# Patient Record
Sex: MALE | Race: WHITE | NOT HISPANIC OR LATINO | Employment: PART TIME | URBAN - METROPOLITAN AREA
[De-identification: names, ages, dates, MRNs, and addresses within clinical notes are randomized per-mention and may not be internally consistent; named-entity substitution may affect disease eponyms.]

---

## 2017-04-16 ENCOUNTER — ANESTHESIA - HEALTHEAST (OUTPATIENT)
Dept: INTENSIVE CARE | Facility: HOSPITAL | Age: 57
End: 2017-04-16

## 2017-05-15 ENCOUNTER — AMBULATORY - HEALTHEAST (OUTPATIENT)
Dept: CARDIOLOGY | Facility: CLINIC | Age: 57
End: 2017-05-15

## 2017-05-15 DIAGNOSIS — I48.91 ATRIAL FIBRILLATION (H): ICD-10-CM

## 2017-05-30 ENCOUNTER — OFFICE VISIT - HEALTHEAST (OUTPATIENT)
Dept: CARDIOLOGY | Facility: CLINIC | Age: 57
End: 2017-05-30

## 2017-05-30 DIAGNOSIS — I48.19 PERSISTENT ATRIAL FIBRILLATION (H): ICD-10-CM

## 2017-05-30 DIAGNOSIS — I48.0 PAROXYSMAL ATRIAL FIBRILLATION (H): ICD-10-CM

## 2017-05-30 LAB
ATRIAL RATE - MUSE: 40 BPM
DIASTOLIC BLOOD PRESSURE - MUSE: NORMAL MMHG
INTERPRETATION ECG - MUSE: NORMAL
P AXIS - MUSE: 25 DEGREES
PR INTERVAL - MUSE: 162 MS
QRS DURATION - MUSE: 94 MS
QT - MUSE: 480 MS
QTC - MUSE: 391 MS
R AXIS - MUSE: 17 DEGREES
SYSTOLIC BLOOD PRESSURE - MUSE: NORMAL MMHG
T AXIS - MUSE: 36 DEGREES
VENTRICULAR RATE- MUSE: 40 BPM

## 2017-05-30 ASSESSMENT — MIFFLIN-ST. JEOR: SCORE: 1923.9

## 2017-06-19 ENCOUNTER — OFFICE VISIT - HEALTHEAST (OUTPATIENT)
Dept: CARDIOLOGY | Facility: CLINIC | Age: 57
End: 2017-06-19

## 2017-06-19 DIAGNOSIS — I48.0 PAROXYSMAL ATRIAL FIBRILLATION (H): ICD-10-CM

## 2017-06-19 ASSESSMENT — MIFFLIN-ST. JEOR: SCORE: 1914.83

## 2017-06-21 ENCOUNTER — AMBULATORY - HEALTHEAST (OUTPATIENT)
Dept: CARDIOLOGY | Facility: CLINIC | Age: 57
End: 2017-06-21

## 2017-06-21 ENCOUNTER — SURGERY - HEALTHEAST (OUTPATIENT)
Dept: CARDIOLOGY | Facility: CLINIC | Age: 57
End: 2017-06-21

## 2017-06-21 DIAGNOSIS — I48.91 ATRIAL FIBRILLATION (H): ICD-10-CM

## 2017-07-11 ENCOUNTER — COMMUNICATION - HEALTHEAST (OUTPATIENT)
Dept: CARDIOLOGY | Facility: CLINIC | Age: 57
End: 2017-07-11

## 2017-07-11 ENCOUNTER — ANESTHESIA - HEALTHEAST (OUTPATIENT)
Dept: CARDIOLOGY | Facility: CLINIC | Age: 57
End: 2017-07-11

## 2017-07-11 DIAGNOSIS — Z98.890 STATUS POST CIRCUMFERENTIAL ABLATION OF PULMONARY VEIN: ICD-10-CM

## 2017-07-12 ENCOUNTER — SURGERY - HEALTHEAST (OUTPATIENT)
Dept: CARDIOLOGY | Facility: CLINIC | Age: 57
End: 2017-07-12

## 2017-07-12 ASSESSMENT — MIFFLIN-ST. JEOR: SCORE: 1902.13

## 2017-07-16 ENCOUNTER — COMMUNICATION - HEALTHEAST (OUTPATIENT)
Dept: SCHEDULING | Facility: CLINIC | Age: 57
End: 2017-07-16

## 2017-07-17 ENCOUNTER — AMBULATORY - HEALTHEAST (OUTPATIENT)
Dept: CARDIOLOGY | Facility: CLINIC | Age: 57
End: 2017-07-17

## 2017-07-17 ASSESSMENT — MIFFLIN-ST. JEOR: SCORE: 1896.69

## 2017-07-21 ENCOUNTER — COMMUNICATION - HEALTHEAST (OUTPATIENT)
Dept: CARDIOLOGY | Facility: CLINIC | Age: 57
End: 2017-07-21

## 2017-08-24 ENCOUNTER — COMMUNICATION - HEALTHEAST (OUTPATIENT)
Dept: TELEHEALTH | Facility: CLINIC | Age: 57
End: 2017-08-24

## 2017-08-24 ENCOUNTER — OFFICE VISIT - HEALTHEAST (OUTPATIENT)
Dept: CARDIOLOGY | Facility: CLINIC | Age: 57
End: 2017-08-24

## 2017-08-24 DIAGNOSIS — Z86.79 S/P ABLATION OF ATRIAL FIBRILLATION: ICD-10-CM

## 2017-08-24 DIAGNOSIS — Z98.890 S/P ABLATION OF ATRIAL FIBRILLATION: ICD-10-CM

## 2017-08-24 DIAGNOSIS — I48.0 PAROXYSMAL ATRIAL FIBRILLATION (H): ICD-10-CM

## 2017-08-24 ASSESSMENT — MIFFLIN-ST. JEOR: SCORE: 1921.51

## 2017-08-28 ENCOUNTER — HOSPITAL ENCOUNTER (OUTPATIENT)
Dept: CARDIOLOGY | Facility: HOSPITAL | Age: 57
Discharge: HOME | End: 2017-08-28
Attending: NURSE PRACTITIONER

## 2017-08-28 DIAGNOSIS — Z98.890 S/P ABLATION OF ATRIAL FIBRILLATION: ICD-10-CM

## 2017-08-28 DIAGNOSIS — Z86.79 S/P ABLATION OF ATRIAL FIBRILLATION: ICD-10-CM

## 2017-08-28 DIAGNOSIS — I48.0 PAROXYSMAL ATRIAL FIBRILLATION (H): ICD-10-CM

## 2017-09-21 ENCOUNTER — COMMUNICATION - HEALTHEAST (OUTPATIENT)
Dept: CARDIOLOGY | Facility: CLINIC | Age: 57
End: 2017-09-21

## 2017-09-28 ENCOUNTER — HOSPITAL ENCOUNTER (OUTPATIENT)
Dept: CT IMAGING | Facility: CLINIC | Age: 57
Discharge: HOME | End: 2017-09-28
Attending: INTERNAL MEDICINE

## 2017-09-28 DIAGNOSIS — Z98.890 STATUS POST CIRCUMFERENTIAL ABLATION OF PULMONARY VEIN: ICD-10-CM

## 2017-09-28 LAB — BSA FOR ECHO PROCEDURE: 2.23 M2

## 2017-09-28 ASSESSMENT — MIFFLIN-ST. JEOR: SCORE: 1901.22

## 2017-10-11 ENCOUNTER — OFFICE VISIT - HEALTHEAST (OUTPATIENT)
Dept: CARDIOLOGY | Facility: CLINIC | Age: 57
End: 2017-10-11

## 2017-10-11 DIAGNOSIS — I49.9 VENTRICULAR ARRHYTHMIA: ICD-10-CM

## 2017-10-11 DIAGNOSIS — I48.0 PAROXYSMAL ATRIAL FIBRILLATION (H): ICD-10-CM

## 2017-10-11 LAB
ATRIAL RATE - MUSE: 58 BPM
DIASTOLIC BLOOD PRESSURE - MUSE: NORMAL MMHG
INTERPRETATION ECG - MUSE: NORMAL
P AXIS - MUSE: 46 DEGREES
PR INTERVAL - MUSE: 146 MS
QRS DURATION - MUSE: 94 MS
QT - MUSE: 446 MS
QTC - MUSE: 437 MS
R AXIS - MUSE: 13 DEGREES
SYSTOLIC BLOOD PRESSURE - MUSE: NORMAL MMHG
T AXIS - MUSE: 35 DEGREES
VENTRICULAR RATE- MUSE: 58 BPM

## 2017-10-11 ASSESSMENT — MIFFLIN-ST. JEOR: SCORE: 1920.72

## 2018-11-02 ENCOUNTER — COMMUNICATION - HEALTHEAST (OUTPATIENT)
Dept: ADMINISTRATIVE | Facility: CLINIC | Age: 58
End: 2018-11-02

## 2019-08-16 ENCOUNTER — COMMUNICATION - HEALTHEAST (OUTPATIENT)
Dept: CARDIOLOGY | Facility: CLINIC | Age: 59
End: 2019-08-16

## 2019-08-22 ENCOUNTER — OFFICE VISIT - HEALTHEAST (OUTPATIENT)
Dept: CARDIOLOGY | Facility: CLINIC | Age: 59
End: 2019-08-22

## 2019-08-22 DIAGNOSIS — I48.0 PAROXYSMAL ATRIAL FIBRILLATION (H): ICD-10-CM

## 2019-08-22 RX ORDER — FLECAINIDE ACETATE 50 MG/1
50 TABLET ORAL DAILY PRN
Qty: 10 TABLET | Refills: 0 | Status: SHIPPED | OUTPATIENT
Start: 2019-08-22 | End: 2022-07-19

## 2019-08-22 ASSESSMENT — MIFFLIN-ST. JEOR: SCORE: 1896.69

## 2019-08-27 ENCOUNTER — AMBULATORY - HEALTHEAST (OUTPATIENT)
Dept: CARDIOLOGY | Facility: CLINIC | Age: 59
End: 2019-08-27

## 2019-08-27 ENCOUNTER — HOSPITAL ENCOUNTER (OUTPATIENT)
Dept: CARDIOLOGY | Facility: HOSPITAL | Age: 59
Discharge: HOME | End: 2019-08-27

## 2019-08-27 DIAGNOSIS — I48.0 PAROXYSMAL ATRIAL FIBRILLATION (H): ICD-10-CM

## 2019-08-27 DIAGNOSIS — I35.9 AORTIC VALVE DISORDER: ICD-10-CM

## 2019-08-27 LAB
AORTIC ROOT: 4.1 CM
AORTIC VALVE MEAN VELOCITY: 80.5 CM/S
ASCENDING AORTA: 3.8 CM
AV DIMENSIONLESS INDEX VTI: 0.8
AV MEAN GRADIENT: 3 MMHG
AV PEAK GRADIENT: 4.6 MMHG
AV VALVE AREA: 3.4 CM2
AV VELOCITY RATIO: 0.8
BSA FOR ECHO PROCEDURE: 2.42 M2
CV BLOOD PRESSURE: NORMAL MMHG
CV ECHO HEIGHT: 75 IN
CV ECHO WEIGHT: 244 LBS
DOP CALC AO PEAK VEL: 107 CM/S
DOP CALC AO VTI: 24.3 CM
DOP CALC LVOT AREA: 4.15 CM2
DOP CALC LVOT DIAMETER: 2.3 CM
DOP CALC LVOT PEAK VEL: 88.8 CM/S
DOP CALC LVOT STROKE VOLUME: 83.5 CM3
DOP CALC MV VTI: 28.2 CM
DOP CALCLVOT PEAK VEL VTI: 20.1 CM
EJECTION FRACTION: 63 % (ref 55–75)
FRACTIONAL SHORTENING: 36.1 % (ref 28–44)
INTERVENTRICULAR SEPTUM IN END DIASTOLE: 0.91 CM (ref 0.6–1)
IVS/PW RATIO: 1
LA AREA 1: 23 CM2
LA AREA 2: 26 CM2
LEFT ATRIUM LENGTH: 5.4 CM
LEFT ATRIUM SIZE: 3 CM
LEFT ATRIUM TO AORTIC ROOT RATIO: 0.73 NO UNITS
LEFT ATRIUM VOLUME INDEX: 38.9 ML/M2
LEFT ATRIUM VOLUME: 94.1 ML
LEFT VENTRICLE CARDIAC INDEX: 1.8 L/MIN/M2
LEFT VENTRICLE CARDIAC OUTPUT: 4.3 L/MIN
LEFT VENTRICLE DIASTOLIC VOLUME INDEX: 56.6 CM3/M2 (ref 34–74)
LEFT VENTRICLE DIASTOLIC VOLUME: 137 CM3 (ref 62–150)
LEFT VENTRICLE HEART RATE: 51 BPM
LEFT VENTRICLE MASS INDEX: 64.1 G/M2
LEFT VENTRICLE SYSTOLIC VOLUME INDEX: 21 CM3/M2 (ref 11–31)
LEFT VENTRICLE SYSTOLIC VOLUME: 50.9 CM3 (ref 21–61)
LEFT VENTRICULAR INTERNAL DIMENSION IN DIASTOLE: 4.98 CM (ref 4.2–5.8)
LEFT VENTRICULAR INTERNAL DIMENSION IN SYSTOLE: 3.18 CM (ref 2.5–4)
LEFT VENTRICULAR MASS: 155.2 G
LEFT VENTRICULAR OUTFLOW TRACT MEAN GRADIENT: 2 MMHG
LEFT VENTRICULAR OUTFLOW TRACT MEAN VELOCITY: 59.7 CM/S
LEFT VENTRICULAR OUTFLOW TRACT PEAK GRADIENT: 3 MMHG
LEFT VENTRICULAR POSTERIOR WALL IN END DIASTOLE: 0.87 CM (ref 0.6–1)
LV STROKE VOLUME INDEX: 34.5 ML/M2
MITRAL VALVE DECELERATION SLOPE: 3330 MM/S2
MITRAL VALVE E/A RATIO: 1.3
MITRAL VALVE MEAN INFLOW VELOCITY: 40.8 CM/S
MITRAL VALVE PEAK VELOCITY: 78.5 CM/S
MITRAL VALVE PRESSURE HALF-TIME: 67 MS
MV AREA VTI: 2.96 CM2
MV AVERAGE E/E' RATIO: 8.2 CM/S
MV DECELERATION TIME: 229 MS
MV E'TISSUE VEL-LAT: 6 CM/S
MV E'TISSUE VEL-MED: 12.6 CM/S
MV LATERAL E/E' RATIO: 12.7
MV MEAN GRADIENT: 1 MMHG
MV MEDIAL E/E' RATIO: 6.1
MV PEAK A VELOCITY: 58.7 CM/S
MV PEAK E VELOCITY: 76.3 CM/S
MV PEAK GRADIENT: 2.5 MMHG
MV VALVE AREA BY CONTINUITY EQUATION: 3 CM2
MV VALVE AREA PRESSURE 1/2 METHOD: 3.3 CM2
NUC REST DIASTOLIC VOLUME INDEX: 3904 LBS
NUC REST SYSTOLIC VOLUME INDEX: 75 IN
PR MAX PG: 1 MMHG
PR PEAK VELOCITY: 65.2 CM/S
PV PEAK GRADIENT: 2.6 MMHG
PV VMAX: 79.9 CM/S
TRICUSPID REGURGITATION PEAK PRESSURE GRADIENT: 16.3 MMHG
TRICUSPID VALVE ANULAR PLANE SYSTOLIC EXCURSION: 2.7 CM
TRICUSPID VALVE PEAK REGURGITANT VELOCITY: 202 CM/S

## 2019-08-27 ASSESSMENT — MIFFLIN-ST. JEOR: SCORE: 1987.41

## 2019-09-30 ENCOUNTER — OFFICE VISIT - HEALTHEAST (OUTPATIENT)
Dept: CARDIOLOGY | Facility: CLINIC | Age: 59
End: 2019-09-30

## 2019-09-30 DIAGNOSIS — I48.0 PAF (PAROXYSMAL ATRIAL FIBRILLATION) (H): ICD-10-CM

## 2019-09-30 ASSESSMENT — MIFFLIN-ST. JEOR: SCORE: 1883.53

## 2019-10-09 ENCOUNTER — HOSPITAL ENCOUNTER (OUTPATIENT)
Dept: CARDIOLOGY | Facility: HOSPITAL | Age: 59
Discharge: HOME | End: 2019-10-09
Attending: INTERNAL MEDICINE

## 2019-10-09 DIAGNOSIS — I48.0 PAF (PAROXYSMAL ATRIAL FIBRILLATION) (H): ICD-10-CM

## 2019-10-15 ENCOUNTER — AMBULATORY - HEALTHEAST (OUTPATIENT)
Dept: CARDIOLOGY | Facility: CLINIC | Age: 59
End: 2019-10-15

## 2019-12-20 ENCOUNTER — TELEPHONE (OUTPATIENT)
Dept: OTHER | Facility: CLINIC | Age: 59
End: 2019-12-20

## 2021-04-16 ENCOUNTER — RECORDS - HEALTHEAST (OUTPATIENT)
Dept: LAB | Facility: CLINIC | Age: 61
End: 2021-04-16

## 2021-04-16 LAB
ANION GAP SERPL CALCULATED.3IONS-SCNC: 11 MMOL/L (ref 5–18)
BUN SERPL-MCNC: 8 MG/DL (ref 8–22)
CALCIUM SERPL-MCNC: 9 MG/DL (ref 8.5–10.5)
CHLORIDE BLD-SCNC: 107 MMOL/L (ref 98–107)
CHOLEST SERPL-MCNC: 209 MG/DL
CO2 SERPL-SCNC: 22 MMOL/L (ref 22–31)
CREAT SERPL-MCNC: 0.86 MG/DL (ref 0.7–1.3)
FASTING STATUS PATIENT QL REPORTED: ABNORMAL
GFR SERPL CREATININE-BSD FRML MDRD: >60 ML/MIN/1.73M2
GLUCOSE BLD-MCNC: 96 MG/DL (ref 70–125)
HDLC SERPL-MCNC: 47 MG/DL
LDLC SERPL CALC-MCNC: 141 MG/DL
POTASSIUM BLD-SCNC: 4.5 MMOL/L (ref 3.5–5)
PSA SERPL-MCNC: 1.8 NG/ML (ref 0–4.5)
SODIUM SERPL-SCNC: 140 MMOL/L (ref 136–145)
TRIGL SERPL-MCNC: 105 MG/DL

## 2021-04-20 ENCOUNTER — AMBULATORY - HEALTHEAST (OUTPATIENT)
Dept: NURSING | Facility: CLINIC | Age: 61
End: 2021-04-20

## 2021-05-11 ENCOUNTER — AMBULATORY - HEALTHEAST (OUTPATIENT)
Dept: NURSING | Facility: CLINIC | Age: 61
End: 2021-05-11

## 2021-05-24 ENCOUNTER — RECORDS - HEALTHEAST (OUTPATIENT)
Dept: ADMINISTRATIVE | Facility: CLINIC | Age: 61
End: 2021-05-24

## 2021-05-31 ENCOUNTER — RECORDS - HEALTHEAST (OUTPATIENT)
Dept: ADMINISTRATIVE | Facility: CLINIC | Age: 61
End: 2021-05-31

## 2021-05-31 VITALS — BODY MASS INDEX: 27.98 KG/M2 | WEIGHT: 225 LBS | HEIGHT: 75 IN

## 2021-05-31 VITALS — HEIGHT: 75 IN | BODY MASS INDEX: 27.85 KG/M2 | WEIGHT: 224 LBS

## 2021-05-31 VITALS — BODY MASS INDEX: 28.6 KG/M2 | HEIGHT: 75 IN | WEIGHT: 230 LBS

## 2021-05-31 VITALS — BODY MASS INDEX: 28.35 KG/M2 | HEIGHT: 75 IN | WEIGHT: 228 LBS

## 2021-05-31 VITALS — BODY MASS INDEX: 28.54 KG/M2 | WEIGHT: 229.5 LBS | HEIGHT: 75 IN

## 2021-05-31 VITALS — BODY MASS INDEX: 28.42 KG/M2 | HEIGHT: 75 IN | WEIGHT: 228.6 LBS

## 2021-05-31 VITALS — HEIGHT: 76 IN | WEIGHT: 225.8 LBS | BODY MASS INDEX: 27.5 KG/M2

## 2021-05-31 NOTE — PATIENT INSTRUCTIONS - HE
Rogers Martinez,    It was a pleasure to see you today at the [facility].     My recommendations after this visit include:    You will get a prescription for flecainide to attempt to convert you back to normal rhythm if you have afib symptoms.     Take your Eliquis until 9/16/19. You may stop at this point. Resume if any symptoms of Afib.     Schedule to see Dr. Mccann for possible repeat EP study and ablation.       Nancy Santos NP-C  [facility] [place] [facility], Electrophysiology  [phone]

## 2021-05-31 NOTE — TELEPHONE ENCOUNTER
Called and spoke with patient, he hasn't been seen in roughly 2 years.  Patient advised to go to emergency room for further assessment.  Patient is agreeable.  JW

## 2021-05-31 NOTE — TELEPHONE ENCOUNTER
----- Message from Cherri Shirlene sent at 8/16/2019  2:17 PM CDT -----  Contact: PATIENT  General phone call:    Caller: alee  Primary cardiologist: soifa  Detailed reason for call: pt has been in afib for the last 8 hours  New or active symptoms? active  Best phone number: [phone]  Best time to contact: any  Ok to leave a detailed message? yes  Device? no    Additional Info:

## 2021-06-03 VITALS
RESPIRATION RATE: 16 BRPM | SYSTOLIC BLOOD PRESSURE: 128 MMHG | DIASTOLIC BLOOD PRESSURE: 80 MMHG | WEIGHT: 221.1 LBS | BODY MASS INDEX: 27.49 KG/M2 | HEART RATE: 64 BPM | HEIGHT: 75 IN

## 2021-06-03 VITALS — HEIGHT: 75 IN | BODY MASS INDEX: 30.34 KG/M2 | WEIGHT: 244 LBS

## 2021-06-03 VITALS — HEIGHT: 75 IN | BODY MASS INDEX: 27.85 KG/M2 | WEIGHT: 224 LBS

## 2021-06-10 NOTE — ANESTHESIA POSTPROCEDURE EVALUATION
Patient: Rogers Martinez  * No procedures listed *  Anesthesia type: general    Patient location: PACU  Last vitals:   Vitals:    04/16/17 0908   BP: 115/78   Pulse: (!) 57   Resp: 14   Temp: 36.8  C (98.3  F)   SpO2: 100%     Post vital signs: stable  Level of consciousness: awake and responds to simple questions  Post-anesthesia pain: pain controlled  Post-anesthesia nausea and vomiting: no  Pulmonary: unassisted, return to baseline  Cardiovascular: stable and blood pressure at baseline  Hydration: adequate  Anesthetic events: no    QCDR Measures:  ASA# 11 - Jayda-op Cardiac Arrest: ASA11B - Patient did NOT experience unanticipated cardiac arrest  ASA# 12 - Jayda-op Mortality Rate: ASA12B - Patient did NOT die  ASA# 13 - PACU Re-Intubation Rate: ASA13B - Patient did NOT require a new airway mgmt  ASA# 10 - Composite Anes Safety: ASA10A - No serious adverse event  ASA# 38 - New Corneal Injury: ASA38A - No new exposure keratitis or corneal abrasion in PACU    Additional Notes:

## 2021-06-10 NOTE — ANESTHESIA PREPROCEDURE EVALUATION
Anesthesia Evaluation      Patient summary reviewed     Airway   Mallampati: II  Neck ROM: full   Pulmonary                           Cardiovascular   (+) dysrhythmias, ,     Rhythm: regular  Rate: normal,         Neuro/Psych - negative ROS     Endo/Other - negative ROS      GI/Hepatic/Renal - negative ROS      Other findings: Ef-75%      Dental - normal exam                        Anesthesia Plan  Planned anesthetic: general mask    ASA 2     Anesthetic plan and risks discussed with: patient    Post-op plan: routine recovery

## 2021-06-10 NOTE — PROGRESS NOTES
[facility]    Assessment/Plan:      Problem List Items Addressed This Visit     Atrial fibrillation - Primary    Relevant Medications    metoprolol tartrate (LOPRESSOR) 25 MG tablet    Other Relevant Orders    ECG 12 lead with MUSE (Completed)        Atrial fibrillation: Recurrence of atrial fibrillation despite low-dose flecainide that spontaneously converted back to sinus rhythm.  We had a lengthy discussion of the physiology and natural progression of atrial fibrillation as well as treatment options of medications versus pulmonary vein isolation ablation.  We further discussed ablation utilizing radiofrequency or cryo including the <1% risk for stroke, myocardial or esophageal perforation, phrenic nerve injury, pulmonary vein stenosis, or death, expected recovery and follow-up.  He has a tendency for sinus bradycardia with low-dose flecainide and metoprolol, so likely would not tolerate other antiarrhythmic medications.  Leaning toward pursuing ablative therapies.    As a bridge to ablation, continue flecainide 50 mg twice daily and decrease metoprolol tartrate to 12.5 mg twice daily.  He was instructed to call if a fib episodes increase in frequency or duration and if an episode persists, to call within the first 12-24 hours from onset and remain NPO for possible cardioversion or until other instructions have been received.  The episode occurs after hours or on the weekend, he was instructed to take an extra flecainide 50 mg tablet at onset of atrial fibrillation.    He was reassured that atrial fibrillation is not life-threatening, but does carry an increased risk for stroke.  He has a [id number] score of 0; continue aspirin 81 mg daily pressure prophylaxis.  We discussed the need to start anticoagulation, likely Eliquis 5 mg twice daily, 3 weeks prior to ablation to continue for a minimum of 3 months following.    Follow-up with Dr. Marquez to further discuss PVI.    Subjective:      Rogers ADHIKARI  "Juan, a very pleasant 57-year-old gentleman, comes in today for follow-up of atrial fibrillation.  He was newly diagnosed with atrial fibrillation on 10/16/16 when he presented to the emergency department with symptoms of palpitations, fatigue, dyspnea on exertion, lightheadedness, and a \"roller coaster feeling\" in his abdomen.  EKG showed atrial fibrillation with rapid ventricular response.  He underwent cardioversion in the ED with resolution of symptoms.  He had recurrence on 4/15/2017 and was started on flecainide 50 mg twice daily in addition to metoprolol tartrate 25 mg twice daily.  He again had recurrence last evening, but the episode ended early this morning.  He has a strong family history for atrial fibrillation including his mother, brother, and sister.  He has no known cardiac history; echocardiogram and NM stress test were normal.  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  He denies chest discomfort, shortness of breath, paroxysmal nocturnal dyspnea, orthopnea, lightheadedness, dizziness, pre-syncope, or syncope.  He drinks both caffeine and alcohol in moderation; his last episode occurred on Memorial Day but was not associated with alcohol consumption.  He was previously tested for and does not have sleep apnea.    Medical, surgical, family, social history, and medications were reviewed and updated as necessary.    Patient Active Problem List   Diagnosis     Partial small bowel obstruction     Abdominal pain     Atrial fibrillation       Past Medical History:   Diagnosis Date     A-fib      H/O abdominal abscess 1/14/2016     Small bowel obstruction        Past Surgical History:   Procedure Laterality Date     APPENDECTOMY       BOWEL RESECTION      with appy, age 9     CARDIOVERSION      10/16/16, 4/16/16       Allergies   Allergen Reactions     Terbinafine Hives       Current Outpatient Prescriptions   Medication Sig Dispense " "Refill     aspirin 81 MG EC tablet Take 1 tablet (81 mg total) by mouth daily.       flecainide (TAMBOCOR) 50 MG tablet Take 1 tablet (50 mg total) by mouth every 12 (twelve) hours. 60 tablet 6     metoprolol tartrate (LOPRESSOR) 25 MG tablet Take 0.5 tablets (12.5 mg total) by mouth 2 (two) times a day. 60 tablet 6     psyllium (METAMUCIL) 3.4 gram packet Take 1 packet by mouth every evening.        No current facility-administered medications for this visit.        Family History   Problem Relation Age of Onset     Atrial fibrillation Mother      Aortic aneurysm Father      Atrial fibrillation Sister      Atrial fibrillation Brother        Social History   Substance Use Topics     Smoking status: Former Smoker     Types: Cigarettes, Cigars     Smokeless tobacco: Never Used     Alcohol use 7.2 oz/week     12 Cans of beer per week       Review of Systems:   General: WNL  Eyes: WNL  Ears/Nose/Throat: WNL  Lungs: WNL  Heart: Irregular Heartbeat  Stomach: WNL  Bladder: WNL  Muscle/Joints: WNL  Skin: WNL  Nervous System: WNL  Mental Health: WNL     Blood: WNL      Objective:      /70 (Patient Site: Right Arm, Patient Position: Sitting, Cuff Size: Adult Large)  Pulse (!) 52  Resp 16  Ht 6' 3\" (1.905 m)  Wt (!) 230 lb (104.3 kg)  BMI 28.75 kg/m2    Physical Exam  General Appearance:  Alert, well-appearing, in no acute distress.     HEENT:  Atraumatic, normocephalic; no scleral icterus, EOM intact, PERRL; the mucous membranes were pink and moist.   Chest: Chest symmetric, spine straight.     Lungs:   Respirations unlabored; the lungs are clear to auscultation.   Cardiovascular:   Auscultation reveals normal first and second heart sounds with no murmurs, rubs, or gallops.  Regular rate and rhythm.  Radial and posterior tibial pulses are intact.    Abdomen:  Soft, nontender, nondistended, bowel sounds present.     Extremities: No cyanosis, clubbing, or edema.   Musculoskeletal:  Moves all extremities.     Skin: No " xanthelasma.   Neurologic: Mood and affect are appropriate. Oriented to person, place, time, and situation.       Cardiographics  EC17 was personally reviewed, shows sinus bradycardia at 40 bpm QT/QTc interval measures 480/391 ms consistent with manual measurement.  ECG 4/15/2017 was also reviewed, shows atrial fibrillation with mildly elevated ventricular response at 94 bpm.  ECG: 10/16/16 was personally reviewed, shows atrial fibrillation with rapid ventricular response at 147 bpm.  Subsequent ECG was also reviewed and shows sinus bradycardia at 56 bpm.    Echocardiogram done 2016   1.Normal left ventricular size with overall normal systolic function.   2.No significant valvular abnormalities.   3.TAPSE is normal, which is suggestive of normal RV systolic function.   4.Borderline enlarged left atrium.   5.No previous echo for comparison.    Exercise stress nuclear study done 2016 is negative for inducible myocardial ischemia.  LVEF: 58%     Lab Review   Lab Results   Component Value Date    CREATININE 0.84 2017    BUN 12 2017     2017    K 4.2 2017     (H) 2017    CO2 23 2017     Lab Results   Component Value Date    WBC 6.0 04/15/2017    HGB 15.2 04/15/2017    HCT 42.7 04/15/2017    MCV 92 04/15/2017     04/15/2017         Greater than 35 minutes were spent face to face in this visit with more than 50% of the time discussing diagnoses as listed above, counseling, and coordination of care.      aSri vAelar, [facility], Electrophysiology  [phone]    This note has been dictated using voice recognition software. Any grammatical, typographical, or context distortions are unintentional and inherent to the software.

## 2021-06-10 NOTE — ANESTHESIA CARE TRANSFER NOTE
Last vitals:   Vitals:    04/16/17 0908   BP: 115/78   Pulse: (!) 57   Resp: 14   Temp: 36.8  C (98.3  F)   SpO2: 100%     Patient's level of consciousness is drowsy  Spontaneous respirations: yes  Maintains airway independently: yes  Dentition unchanged: yes  Oropharynx: oropharynx clear of all foreign objects    QCDR Measures:  ASA# 20 - Surgical No Data Recorded  PQRS# 430 - Adult PONV Prevention: 4558F - Pt received => 2 anti-emetic agents (different classes) preop & intraop  ASA# 8 - Peds PONV Prevention: NA - Not pediatric patient, not GA or 2 or more risk factors NOT present  PQRS# 424 - Jayda-op Temp Management: 4559F - At least one body temp DOCUMENTED => 35.5C or 95.9F within required timeframe  PQRS# 426 - PACU Transfer Protocol: - Transfer of care checklist used  ASA# 14 - Acute Post-op Pain: ASA14B - Patient did NOT experience pain >= 7 out of 10    I completed my SBAR handoff to the receiving nurse per policy and procedure.

## 2021-06-11 NOTE — ANESTHESIA CARE TRANSFER NOTE
Last vitals:   Vitals:    07/12/17 1239   BP: 109/65   Pulse: 75   Resp: 16   Temp: 37.1  C (98.7  F)   SpO2: 99%     Patient's level of consciousness is drowsy  Spontaneous respirations: yes  Maintains airway independently: yes  Dentition unchanged: yes  Oropharynx: oropharynx clear of all foreign objects    QCDR Measures:  ASA# 20 - Surgical Safety Checklist: ASA20A - Safety Checks Done  PQRS# 430 - Adult PONV Prevention: 4558F - Pt received => 2 anti-emetic agents (different classes) preop & intraop  ASA# 8 - Peds PONV Prevention: NA - Not pediatric patient, not GA or 2 or more risk factors NOT present  PQRS# 424 - Jayda-op Temp Management: 4559F - At least one body temp DOCUMENTED => 35.5C or 95.9F within required timeframe  PQRS# 426 - PACU Transfer Protocol: - Transfer of care checklist used  ASA# 14 - Acute Post-op Pain: ASA14B - Patient did NOT experience pain >= 7 out of 10   1222:  To 4100 via O2, monitors and spontaneous ventilation.    1229:  Report to VIVIAN Crowley).

## 2021-06-11 NOTE — PROGRESS NOTES
Pt was seen in clinic for post-op PVI with Dr Marquez on 7/12/17.     Pts VSS, wt stable, LSC, heart rate regular, heart sounds S1 and S2 present and palpable radial and pedal pulses. Pt denies generalized or localized pain abnormal to healing s/p, difficulty swallowing, thoat pain, heart burn, chest pain and or s/s of infection. Pt isstaying well hydrated, has a good appetite and eating well balanced meals. Pt denies palpitations, irregularities in HR or rhythm and or sensations of A-Fib.    Please see PC from 7/16. Pt states that late that evening he walked up a flight of stairs, felt sharp upper neck pain, felt lightheaded, SOB, and had flashes of red lights. He rested and symptoms resolved. He said that he feel that his overall exercise tolerance is slightly down, feels lightheaded with positional changes, and get SOB easier than prior to PVI. He also states that he had one other occurrence of seeing flashing white lights, but lasted a few seconds and resolved. He states that he is over all feeling better today.  Pt denies s/s of MI, s/s if CVA, denies HA, no sputum, has been afebrile, denies s/s of infection, no issues with swallowing, and denies heartburn/indegestion or throat pain.   Pts BP on the lower side of pts baseline, checked orthostatic BP in clinic as pt c/o lightheadedness with changes in position. Sitting /65, standing 98/62, and pt denies symptoms.    Above discussed with Dr Marquez, agrees to monitor s/s. Pt will get f/u call on Thursday 7/20 to check on symptoms. I also reviewed this with the pt.    Pts right groin has 1 puncture sites, is C/D/I, no drainage, small amount of bruising noted around puncture sites, no sutures in place, groin is soft, and pt denies pain at the site. Left groin has 2 puncture sites, is C/D/I, no drainage, small amount of amount of bruising noted around puncture site, no sutures in place, groin is soft, and pt denies pain at the site. No bruits we heard  bilaterally, and pt has strong bilateral femoral and pedal pulses present. All puncture sites were left open to air.    Anticoagulation/Medication reviewed with pt:  Pt was instructed to remain on Eliquis without interruption until seen for 3mo follow-up, for further instructions/managment.  Reviewed with pt importance of anticoagulation s/p PVI, reviewed with pt s/s of bleeding while on anticoagulation s/p PVI and encouraged to call with any questions or concerns about anticoagulants  Pt will continue ASA 81mg Daily for 1 mo s/p PVI    Education completed with pt at this visit:  Reviewed post-op PVI healing process, f/u requirements, physical restrictions, nutritional requirements, when to contact EP-RN/EP-MD, contact information was given to the pt for further concerns or questions and pt verbalized understanding

## 2021-06-11 NOTE — PROGRESS NOTES
1960  [phone] (home)  There is no such number on file (mobile).    +++Important patient information for CSC/Cath Lab staff : None+++    [facility] EP Cath Lab Procedure Order   Ablation Type:  PVI- Atrial Fibrillation  Specific location of pathway: Left    Diagnosis:  AF  Anticipated Case Duration:  4  Scheduling Needs/Timeframe:  Schedule at least 3 wks from 6/19/17    MD Preference: Dr Jimmy ABURTO Assist:  No Specific MD:  N/A    Current Device: None None  Device Company/Device Rep Needed for Procedure: None    Pre-Procedural Testing needed: None  Mapping System Required:  Carto  ICE Needed:  Yes  Anesthesia:  General-Whole Case  Research Protocol:  No    [facility] EP Cath Lab Prep   Ordering Provider: Dr Marquez  Ordering Date: 6/21/2017  Orders Status: Intial order placed and Order set placed  EP NC Contact: Yi You RN    H&P:  Compled by Dr Marquez on 6/19, or EP NP to complete  PCP: Crescencio Barnett MD, [phone]    Pre-op Labs: Done within 7 days    Medical Records Pertinent for Procedure:  Echo 11/2/16 EPIC and EKG 5/30/17, 4/15/17 EPIC    Patient Education:    Teach with Patient: Teach with pt completed same day as pre-op H&P-see additional clinic note    Risks Reviewed:     Pulmonary Vein/AF/Radiofrequency Ablation  In addition to standard risks for Radiofrequency Ablation, there is:    <2% for significant pulmonary vein stenosis    <2% risk for embolic events    <1% risk for esophageal fistula    <1% risk death    Pulmonary Vein Isolation / Cryoablation Risks:    1-2% risk for phrenic nerve paralysis    <1% risk for pulmonary vein stenosis    Risk of esophageal irritation with no incidence of atrial-esophageal fistula    Rare cryoballoon rupture  <1% risk death       Cardiac Catheter Ablation    <1% risk for the following: hypotension, hemorrhage, vascular injury including perforation of vein, artery or heart, thrombophlebitis, systemic or pulmonic emboli; cardiac perforation,  (tamponade), infection, pneumothorax, arrhythmias, proarrhythmic effects of drugs, radiation exposure.    1-2% complete heart block (for AVNRT or septol accessory pathway).    <0.5% CVA or MI    <0.1% death    If external defibrillation is needed, 75% risk for superficial burn.    1-2% tamponade and aortic puncture with left sided transeptal approach for left side MARLEY - increase risk of CVA to <2%.  Late arrhythmia recurrences depends upon the primary rhythm disturbance.    Consent: Will be obtained in [facility] day of procedure    Pre-Procedure Instructions that were Reviewed with Patient:  NPO after midnight, Notified patient of time and date of procedure by CV , Transportation arrangements needed s/p procedure, Post-procedure follow up process, Sedation plan/orders and Pre-procedure letter was sent to pt by CV     Pre-Procedure Medication Instructions:  Instructions given to pt regarding anticoagulants: Eliquis- instructed to continue anticoagulation uninterrupted through their procedure  Instructions given to pt regarding antiarrhythmic medication: Flecainide; Pt instructed to continue medication prior to procedure  Instructions for medication, other than anticoagulants/antiarrhythmics listed above, given to pt: to hold all morning medications    Allergies   Allergen Reactions     Terbinafine Hives       Current Outpatient Prescriptions:      apixaban (ELIQUIS) 5 mg Tab tablet, Take 1 tablet (5 mg total) by mouth 2 (two) times a day., Disp: 60 tablet, Rfl: 6     aspirin 81 MG EC tablet, Take 1 tablet (81 mg total) by mouth daily., Disp: , Rfl:      flecainide (TAMBOCOR) 50 MG tablet, Take 1 tablet (50 mg total) by mouth every 12 (twelve) hours., Disp: 60 tablet, Rfl: 6     metoprolol tartrate (LOPRESSOR) 25 MG tablet, Take 0.5 tablets (12.5 mg total) by mouth 2 (two) times a day., Disp: 60 tablet, Rfl: 6     psyllium (METAMUCIL) 3.4 gram packet, Take 1 packet by mouth every evening. , Disp: , Rfl:

## 2021-06-11 NOTE — ANESTHESIA PREPROCEDURE EVALUATION
Anesthesia Evaluation      Patient summary reviewed   No history of anesthetic complications     Airway   Mallampati: III  Neck ROM: full   Pulmonary - normal exam   (+) a smoker                         Cardiovascular   Exercise tolerance: > or = 4 METS  (+) dysrhythmias, ,     ECG reviewed  Rhythm: irregular  Rate: normal,         Neuro/Psych - negative ROS     Endo/Other - negative ROS      GI/Hepatic/Renal - negative ROS           Dental - normal exam                        Anesthesia Plan  Planned anesthetic: general endotracheal    ASA 2   Induction: intravenous   Anesthetic plan and risks discussed with: patient and spouse    Post-op plan: routine recovery

## 2021-06-11 NOTE — ANESTHESIA POSTPROCEDURE EVALUATION
Patient: Rogers Martinez  EP Ablation PVI - GENERAL ANESTHESIA WHOLE CASE, H&P DONE, TEACH-RACHELE, CARTO BOOKED 06/21, ICE NEEDED, NO DEVICE (MARIANAERKINS)  Anesthesia type: general    Patient location: floor  Last vitals:   Vitals:    07/12/17 1713   BP: 92/53   Pulse: 60   Resp:    Temp:    SpO2:      Post vital signs: stable  Level of consciousness: awake and responds to simple questions  Post-anesthesia pain: pain controlled  Post-anesthesia nausea and vomiting: no  Pulmonary: unassisted, return to baseline  Cardiovascular: stable and blood pressure at baseline  Hydration: adequate  Anesthetic events: no    QCDR Measures:  ASA# 11 - Jayda-op Cardiac Arrest: ASA11B - Patient did NOT experience unanticipated cardiac arrest  ASA# 12 - Jayda-op Mortality Rate: ASA12B - Patient did NOT die  ASA# 13 - PACU Re-Intubation Rate: ASA13B - Patient did NOT require a new airway mgmt  ASA# 10 - Composite Anes Safety: ASA10A - No serious adverse event  ASA# 38 - New Corneal Injury: ASA38A - No new exposure keratitis or corneal abrasion in PACU    Additional Notes:

## 2021-06-12 NOTE — PROGRESS NOTES
"[facility]    Assessment/Plan:      Problem List Items Addressed This Visit     Atrial fibrillation - Primary    Relevant Orders    One-Lead Patch Monitor    S/P ablation of atrial fibrillation    Relevant Orders    One-Lead Patch Monitor        Paroxysmal atrial fibrillation: Rogers has had an uneventful recovery from ablation with no emergency department or unscheduled clinic visits.  He has had a few brief symptomatic episodes of arrhythmia since his ablation.  We discussed that this is not uncommon immediately following ablation and will likely resolve.  He will wear a two-week monitor for further evaluation.  He continues to have a tendency for sinus bradycardia even on very low-dose metoprolol.  Discontinue metoprolol.    He has a [id number] score of 0.  Discontinue aspirin.  Continue Eliquis 5 mg twice daily for at least 3 months post-PVI at which time I anticipate this will be switched back to low-dose daily aspirin.    Follow-up with Dr. Marquez in early October for his 3 month post-PVI evaluation.    Subjective:      Rogers Martinez, a very pleasant 57-year-old gentleman, comes in today for follow-up of atrial fibrillation.  He was newly diagnosed with atrial fibrillation on 10/16/16 associated with symptoms of palpitations, fatigue, dyspnea on exertion, lightheadedness, and a \"roller coaster feeling\" in his abdomen.  EKG showed atrial fibrillation with rapid ventricular response.  He underwent cardioversion in the ED with resolution of symptoms.  He failed antiarrhythmic therapy with flecainide due to breakthrough.  He was started on Eliquis for stroke prophylaxis and subsequently underwent pulmonary vein isolation ablation with Dr. Marquez on 7/12/2017 with radiofrequency to all 4 pulmonary veins after which his flecainide was discontinued.  Of note, he was in atrial fibrillation at the time of ablation and converted to sinus rhythm during the procedure.  He has no known cardiac " history; echocardiogram and NM stress test were normal.  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  He drinks both caffeine and alcohol in moderation  He was previously tested for and does not have sleep apnea.      Rogers states that he has been feeling very well and had an uneventful recovery from ablation.  He denies any heartburn, difficulty swallowing, or groin site issues.  A few days after ablation, he had couple of episodes of orthostatic lightheadedness without recurrence.  He reports having approximately 5 episodes of atrial fibrillation that lasted only 1-2 hours.  He denies chest discomfort, shortness of breath, paroxysmal nocturnal dyspnea, orthopnea, lightheadedness, dizziness, pre-syncope, or syncope.      Medical, surgical, family, social history, and medications were reviewed and updated as necessary.  He has a strong family history for atrial fibrillation including his mother, brother, and sister.    Patient Active Problem List   Diagnosis     Partial small bowel obstruction     Atrial fibrillation     S/P ablation of atrial fibrillation       Past Medical History:   Diagnosis Date     A-fib      H/O abdominal abscess 1/14/2016     Small bowel obstruction        Past Surgical History:   Procedure Laterality Date     APPENDECTOMY       BOWEL RESECTION      with appy, age 9     CARDIOVERSION      10/16/16, 4/16/17     OK EPHYS EVL TRNSPTL TX ATRIAL FIB ISOLAT PULM VEIN Left 7/12/2017    Procedure: EP Ablation PVI  RF to 4 PV;  Surgeon: Celina Marquez MD;        Allergies   Allergen Reactions     Terbinafine Hives       Current Outpatient Prescriptions   Medication Sig Dispense Refill     apixaban (ELIQUIS) 5 mg Tab tablet Take 1 tablet (5 mg total) by mouth 2 (two) times a day. 60 tablet 6     psyllium (METAMUCIL) 3.4 gram packet Take 1 packet by mouth every evening.        No current facility-administered  "medications for this visit.        Family History   Problem Relation Age of Onset     Atrial fibrillation Mother      Aortic aneurysm Father      Atrial fibrillation Sister      Atrial fibrillation Brother        Social History   Substance Use Topics     Smoking status: Former Smoker     Types: Cigarettes, Cigars     Smokeless tobacco: Never Used     Alcohol use 7.2 oz/week     12 Cans of beer per week       Review of Systems:   General: WNL  Eyes: WNL  Ears/Nose/Throat: WNL  Lungs: WNL  Heart: WNL  Stomach: WNL  Bladder: WNL  Muscle/Joints: WNL  Skin: WNL  Nervous System: WNL  Mental Health: WNL     Blood: WNL      Objective:      /70 (Patient Site: Right Arm, Patient Position: Sitting, Cuff Size: Adult Large)  Pulse (!) 56  Resp 16  Ht 6' 3.25\" (1.911 m)  Wt (!) 228 lb 9.6 oz (103.7 kg)  BMI 28.38 kg/m2    Physical Exam  General Appearance:  Alert, well-appearing, in no acute distress.     HEENT:  Atraumatic, normocephalic; no scleral icterus, EOM intact, PERRL; the mucous membranes were pink and moist.   Chest: Chest symmetric, spine straight.     Lungs:   Respirations unlabored; the lungs are clear to auscultation.   Cardiovascular:   Auscultation reveals normal first and second heart sounds with no murmurs, rubs, or gallops.  Regular rate and rhythm.  Radial and posterior tibial pulses are intact.    Abdomen:  Soft, nontender, nondistended, bowel sounds present.     Extremities: No cyanosis, clubbing, or edema.   Musculoskeletal:  Moves all extremities.     Skin: No xanthelasma.   Neurologic: Mood and affect are appropriate. Oriented to person, place, time, and situation.       Cardiographics (personally reviewed)  ECG 2017 shows atrial fibrillation with controlled ventricular response at 81 bpm.  EC17 shows sinus bradycardia at 40 bpm QT/QTc interval measures 480/391 ms consistent with manual measurement.  ECG 4/15/2017 shows atrial fibrillation with mildly elevated ventricular response " at 94 bpm.  ECG: 10/16/16 shows atrial fibrillation with rapid ventricular response at 147 bpm.  Subsequent ECG was also reviewed and shows sinus bradycardia at 56 bpm.    Echocardiogram done 11/2/2016   1.Normal left ventricular size with overall normal systolic function.   2.No significant valvular abnormalities.   3.TAPSE is normal, which is suggestive of normal RV systolic function.   4.Borderline enlarged left atrium.   5.No previous echo for comparison.    Exercise stress nuclear study done 11/2/2016 is negative for inducible myocardial ischemia.  LVEF: 58%     Lab Review   Lab Results   Component Value Date    CREATININE 0.82 07/12/2017    BUN 10 07/12/2017     07/12/2017    K 4.0 07/12/2017     (H) 07/12/2017    CO2 20 (L) 07/12/2017     Lab Results   Component Value Date    WBC 4.9 07/12/2017    HGB 14.4 07/12/2017    HCT 40.7 07/12/2017    MCV 94 07/12/2017     07/12/2017         Greater than 30 minutes were spent face to face in this visit with more than 50% of the time discussing diagnoses as listed above, counseling, and coordination of care.      Sari Avelar, [facility] [place] [facility], Electrophysiology  [phone]    This note has been dictated using voice recognition software. Any grammatical, typographical, or context distortions are unintentional and inherent to the software.

## 2021-06-16 PROBLEM — Z82.49 FAMILY HISTORY OF ABDOMINAL AORTIC ANEURYSM: Status: ACTIVE | Noted: 2017-08-23

## 2021-06-16 PROBLEM — K56.609 SMALL BOWEL OBSTRUCTION (H): Status: ACTIVE | Noted: 2018-07-24

## 2021-06-16 PROBLEM — E78.5 HYPERLIPIDEMIA: Status: ACTIVE | Noted: 2018-08-08

## 2021-06-25 NOTE — PROGRESS NOTES
Progress Notes by Celina Marquez MD at 6/19/2017 10:50 AM     Author: Celina Marquez MD Service: -- Author Type: Physician    Filed: 6/19/2017 11:37 AM Encounter Date: 6/19/2017 Status: Signed    : Celina Marquez MD (Physician)                 Arrhythmia Clinic    Thank you, Dr. Crescencio Barnett MD, for asking the [facility] Arrhythmia team to evaluate Rogers Martinez in consultation for atrial fibrillation      Assessment:     57 year old male with history of paroxysmal symptomatic atrial fibrillation presents for evaluation     Plan:     Atrial fibrillation - paroxysmal and symptomatic with fatigue, palpitations.  Additionally with profound sinus bradycardia, medications are limited.  I discussed with Rogers and his wife atrial fibrillation in detail.  Treatment options including watchful waiting, AAD therapy, or ablation were reviewed.  Given his bradycardia, only AAD would be Tikosyn.  Reviewed 3 day hospitalization with Tikosyn.  Ablation procedure reviewed in detail.  I specifically documented an 85% chance of success with a single procedure given his paroxysmal nature and normal heart.  Risks, including but not limited to: bleeding, bruising, infection, pain, cardiac perforation, stroke, heart attack, PV stenosis, esophageal injury, or death were reviewed.      After discussion, Rogers would like to proceed with ablation.  We will start him on Eliquis 5 mg po bid today.  Schedule ablation in 3 weeks with [facility].     Current History:   Rogers Martinez is a 57 y.o. male with a history of paroxysmal atrial fibrillation dating back about 8 months.  He noted initial onset last year with palpitations and lightheadedness.  This was new for him so he presented to ED and was cardioverted early.  He had recurrent and was started on low dose flecainide (50 mg po bid) but has significant sinus bradycardia which prevents titration.  He notes occasional  episodes of AF lasting minutes to hours, but has not required repeat cardioversion.  Echocardiogram demonstrates normal LV size and function, no valvular abnormalities, and borderline enlarged LA.  He presents today for evaluation of PVI.    Past Medical History:     Past Medical History:   Diagnosis Date   ? A-fib    ? H/O abdominal abscess 1/14/2016   ? Small bowel obstruction        Past Surgical History:     Past Surgical History:   Procedure Laterality Date   ? APPENDECTOMY     ? BOWEL RESECTION      with appy, age 9   ? CARDIOVERSION      10/16/16, 4/16/16       Family History:     Family History   Problem Relation Age of Onset   ? Atrial fibrillation Mother    ? Aortic aneurysm Father    ? Atrial fibrillation Sister    ? Atrial fibrillation Brother        Social History:    reports that he has quit smoking. His smoking use included Cigarettes and Cigars. He has never used smokeless tobacco. He reports that he drinks about 7.2 oz of alcohol per week  He reports that he does not use illicit drugs.    Meds:     Current Outpatient Prescriptions:   ?  aspirin 81 MG EC tablet, Take 1 tablet (81 mg total) by mouth daily., Disp: , Rfl:   ?  flecainide (TAMBOCOR) 50 MG tablet, Take 1 tablet (50 mg total) by mouth every 12 (twelve) hours., Disp: 60 tablet, Rfl: 6  ?  metoprolol tartrate (LOPRESSOR) 25 MG tablet, Take 0.5 tablets (12.5 mg total) by mouth 2 (two) times a day., Disp: 60 tablet, Rfl: 6  ?  psyllium (METAMUCIL) 3.4 gram packet, Take 1 packet by mouth every evening. , Disp: , Rfl:   ?  apixaban (ELIQUIS) 5 mg Tab tablet, Take 1 tablet (5 mg total) by mouth 2 (two) times a day., Disp: 60 tablet, Rfl: 6    Allergies:   Terbinafine    Review of Systems:   A full 12 point review of systems without pertinent positives except as per HPI or below.        Objective:      Physical Exam  Weight: Weight: (!) 228 lb (103.4 kg)  Body mass index is 28.5 kg/(m^2).  /81 (Patient Site: Left Arm, Patient Position:  "Sitting, Cuff Size: Adult Large)  Pulse (!) 44  Resp 16  Ht 6' 3\" (1.905 m)  Wt (!) 228 lb (103.4 kg)  BMI 28.5 kg/m2    General Appearance:   Nad, alert and oriented x 3   HEENT:  Mmm, perrl   Neck: No goiter noted   Chest: No deformities   Lungs:   Clear bilaterally   Cardiovascular:   Gabriel, regular without mrg   Abdomen:  Soft and non tender   Extremities: No clubbing or edema   Skin: No rashes                 Cardiographics  Echo reviewed as above  Note from Sari Avelar NP 5/17 reviewed  ECG 5/17 personally reviewed - sinus bradycardia, normal intervals  Stress test 11/16 personally reviewed - no evidence of ischemia    Imaging  None    Lab Review   Lab Results   Component Value Date     04/16/2017    K 4.2 04/16/2017     (H) 04/16/2017    CO2 23 04/16/2017    BUN 12 04/16/2017    CREATININE 0.84 04/16/2017    CALCIUM 8.4 (L) 04/16/2017     Lab Results   Component Value Date    WBC 6.0 04/15/2017    HGB 15.2 04/15/2017    HCT 42.7 04/15/2017    MCV 92 04/15/2017     04/15/2017     No results found for: CHOL, TRIG, HDL, LDLDIRECT      Celina Marquez MD  [facility] Card[facility], Electrophysiology         "

## 2021-06-25 NOTE — PROGRESS NOTES
Progress Notes by Celina Marquez MD at 10/11/2017 12:50 PM     Author: Celina Marquez MD Service: -- Author Type: Physician    Filed: 10/11/2017  1:53 PM Encounter Date: 10/11/2017 Status: Signed    : Celina Marquez MD (Physician)                 Arrhythmia Clinic    Thank you, Dr. Isael Zurita, for asking the [facility] Arrhythmia team to evaluate Rogers Martinez in follow up for paroxysmal atrial fibrillation post ablation      Assessment:     57 year old male with history of paroxysmal atrial fibrillation presents in 3 month follow up post ablation     Plan:     Atrial fibrillation - paroxysmal and symptomatic.  He has a chads2-vasc of 0 and remains on eliquis at this time.  He has had an uneventful recovery without symptoms of recurrence of arrhythmia.  He healed well without complication.  2 week monitor demonstrates no evidence of recurrent arrhythmia and CT-PV demonstrates no PV stenosis.    Ok to discontinue eliquis today.  Begin baby aspirin daily.    Follow up in 9 months for 1 year follow up post ablation.     Current History:   Rogers Martinez is a 57 y.o. male with a history of paroxysmal atrial fibrillation who underwent pulmonary vein isolation 3 months ago.  Procedure was uneventful and he healed well without complications.  He denies any recurrent arrhythmias.  He underwent CT-PV demonstrating no PV stenosis, and a 2 week monitor which demonstrated no evidence of atrial fibrillation or other arrhythmias.  He has been exercising well without limitations.  He presents today in 3 month follow up.    Past Medical History:     Past Medical History:   Diagnosis Date   ? A-fib    ? H/O abdominal abscess 1/14/2016   ? Small bowel obstruction        Past Surgical History:     Past Surgical History:   Procedure Laterality Date   ? APPENDECTOMY     ? BOWEL RESECTION      with appy, age 9   ? CARDIOVERSION      10/16/16, 4/16/17   ? ND EPHYS EVL TRNSPTL TX  "ATRIAL FIB ISOLAT PULM VEIN Left 7/12/2017    Procedure: EP Ablation PVI  RF to 4 PV;  Surgeon: Celina Marquez MD;        Family History:     Family History   Problem Relation Age of Onset   ? Atrial fibrillation Mother    ? Aortic aneurysm Father    ? Atrial fibrillation Sister    ? Atrial fibrillation Brother        Social History:    reports that he has quit smoking. His smoking use included Cigarettes and Cigars. He has never used smokeless tobacco. He reports that he drinks about 7.2 oz of alcohol per week  He reports that he does not use illicit drugs.    Meds:     Current Outpatient Prescriptions:   ?  psyllium (METAMUCIL) 3.4 gram packet, Take 1 packet by mouth every evening. , Disp: , Rfl:   ?  aspirin 81 MG EC tablet, Take 1 tablet (81 mg total) by mouth daily., Disp: , Rfl: 0    Allergies:   Terbinafine    Review of Systems:   A full 12 point review of systems without pertinent positives except as per HPI or below.        Objective:      Physical Exam  Weight: Weight: (!) 225 lb 12.8 oz (102.4 kg) (shoes on)  Body mass index is 27.49 kg/(m^2).  /72 (Patient Site: Left Arm, Patient Position: Sitting, Cuff Size: Adult Large)  Pulse (!) 56  Resp 16  Ht 6' 4\" (1.93 m) Comment: shoes on  Wt (!) 225 lb 12.8 oz (102.4 kg) Comment: shoes on  BMI 27.49 kg/m2    General Appearance:   Nad, alert and oriented x 3               Lungs:   ctab   Cardiovascular:   RRR   Abdomen:  Soft and non-tender   Extremities: No clubbing or edema   Skin: No rashes                 Cardiographics  ECG performed today personally reviewed - normal sinus rhythm  2 week event monitor personally reviewed 9/17 - as above  CT-PV personally reviewed as above  Note from Sari Avelar NP 8/17 personally reviewed    Imaging  None    Lab Review   Lab Results   Component Value Date     07/12/2017    K 4.0 07/12/2017     (H) 07/12/2017    CO2 20 (L) 07/12/2017    BUN 10 07/12/2017    CREATININE 0.82 07/12/2017    " CALCIUM 8.5 07/12/2017     Lab Results   Component Value Date    WBC 4.9 07/12/2017    HGB 14.4 07/12/2017    HCT 40.7 07/12/2017    MCV 94 07/12/2017     07/12/2017     No results found for: CHOL, TRIG, HDL, LDLDIRECT      Celina Marquez MD  [facility] Cardiology, Electrophysiology

## 2021-06-27 NOTE — PROGRESS NOTES
Progress Notes by Nancy Santos CNP at 8/22/2019 10:30 AM     Author: Nancy Santos CNP Service: -- Author Type: Nurse Practitioner    Filed: 8/22/2019  1:23 PM Encounter Date: 8/22/2019 Status: Addendum    : Nancy Santos CNP (Nurse Practitioner)    Related Notes: Original Note by Nancy Santos CNP (Nurse Practitioner) filed at 8/22/2019  1:22 PM           Click to link to [facility] Heart [facility] HEART [facility] ELECTROPHYSIOLOGY NOTE      Assessment/Recommendations   Assessment/Plan:    Diagnoses and all orders for this visit:    Paroxysmal atrial fibrillation (H)  S/p PVI 10/2017.  He has now had recurrence.  We discussed pill in the pocket versus repeat EPS with ablation.  He will schedule an appoint with Dr. Mccann to discuss.  In the meantime, we will prescribe him the flecainide pill in a pocket.  He will continue Eliquis 5 mg twice daily for 30 days after cardioversion [id number].  If he does have recurrence of onset of symptoms, he will restart Eliquis immediately.      [id number] score of 0 and on Eliquis which she will continue for 4 weeks post cardioversion which was on 8/16/2019.  Follow up with Dr. Mccann at next available appointment.     History of Present Illness    Mr. Rogers Martinez is a very pleasant 59 y.o. male who comes in today with his wife Marci for EP follow-up for recurrence of atrial fibrillation.  Rogers Martinez has a known history of Ian is a pleasant 59-year-old male who appears to have a genetic component of atrial fibrillation.  He was first diagnosed with A. fib 10/16/2016 when he presented to the emergency room.  He underwent cardioversion in the emergency room with success.  He had recurrence on 4/15/2017 and was started on flecainide 50 mg twice daily and metoprolol 25 mg twice daily.  He then failed flecainide, and underwent PVI with Dr. Marquez 7/2017.    First recurrence since ablation.  He noted onset of symptoms  such as lightheadedness, shortness of breath, and significant fatigue.  He presented urgently to the emergency room where he underwent a cardioversion in the ED.  They discharged him with Eliquis therapy to follow-up with us here in the office.  He has had no symptoms of palpitations or lightheadedness since his ablation.      He  denies chest pain, palpitations, shortness of breath, paroxysmal nocturnal dyspnea, orthopnea, lightheadedness, dizziness, pre-syncope, or syncope other than emergency room visit.    Cardiographics (personally reviewed):  Results for orders placed during the hospital encounter of 11/02/16   Echo Complete [ECH10] 11/02/2016    Narrative Transthoracic Echocardiography Report (TTE)     Demographics      Patient Name    RACHEL ADHIKARI Date of Study         11/02/2016      MRN             [id number]       Room Number      Account Number  [id number]      Accession       [id number]   Number      Date of Birth   1960      Referring Physician   LISA BACK CNP      Age             56 year(s)      Sonographer           [id number]      Gender          Male            Interpreting          [facility] OUTPATIENT                                   Physician             KAM VERMA MD     Procedure    Type of Study      TTE procedure:ECHO COMPLETE.     Procedure Date  Date: 11/02/2016 Start: 01:05 AM End: 01:24 AM    Study Location: [facility]  Technical Quality: Adequate visualization    Patient Status: Routine    Height: 75 inches Weight: 225 pounds BSA: 2.31 m^2 BMI: 28.12 kg/m^2    Rhythm: Regular rhythm HR: 45 bpm BP: 116/78 mmHg    Indications  Atrial fibrillation.     Conclusions      Summary   1.Normal left ventricular size with overall normal systolic function.   2.No significant valvular abnormalities.   3.TAPSE is normal, which is suggestive of normal RV systolic function.   4.Borderline enlarged left atrium.   5.No previous echo for comparison.      Findings      Left Ventricle   Normal left  ventricular size.   Mild concentric left ventricular hypertrophy.   Left ventricular ejection fraction is normal.   Left ventricular ejection fraction is calculated to be 75%.   Mild (grade 1) diastolic dysfunction with reversed E/A ratio.      Right Ventricle   TAPSE is normal, which is suggestive of normal RV systolic function.   Normal right ventricular size and systolic function.      Left Atrium   Borderline enlarged left atrium.      Right Atrium   Normal right atrial size.      Great Vessels   Borderline dilation of the sinuses of Valsalva.      Aortic Valve   Aortic valve appears to be tricuspid.   No evidence of aortic valve regurgitation.   No aortic stenosis present.      Mitral Valve   The mitral valve was not well visualized .   Normal mitral valve structure.   No mitral valve stenosis.   No mitral regurgitation.      Tricuspid Valve   Doppler findings do not suggest pulmonary hypertension.   Normal tricuspid valve structure.   No evidence of tricuspid regurgitation.   No tricuspid stenosis.      Pulmonic Valve   The pulmonic valve is not well visualized.   No pulmonic regurgitation.   No evidence of pulmonic valve stenosis.      Pericardial Effusion   No pericardial effusion.     M-Mode/2D Measurements & Calculations      LV Diastolic     LV Systolic Dimension: 2.44  LA Dimension: 2.4 cmAO Root   Dimension: 4.98  cm                           Dimension: 4 cmLA Area: 22.3   cm               LV Volume Diastolic: 99.4 ml cm^2   LV FS:51 %       LV Volume Systolic: 22.1 ml   LV PW Diastolic: LV EDV/LV EDV Index: 99.4   1.16 cm          ml/43 m^2LV ESV/LV ESV   Septum           Index: 22.1 ml/10 m^2        RV Diastolic Dimension: 3.39   Diastolic: 1.26  EF Calculated: 77.8 %        cm   cm                                            TAPSE:2.87 cm   CO: 4.13 l/min   LV Length: 9.45 cm           LA/Aorta: 0.6   CI: 1.79 l/m*m^2                    LVOT: 2.3 cm                 LA volume/Index: 66.3 ml   LV Area                                        /29m^2   Diastolic: 34.3   cm^2   LV Area   Systolic: 16.2   cm^2     Doppler Measurements & Calculations      MV Peak E-Wave: 75.2  AV Peak Velocity: 115  LVOT Peak Velocity: 90.9 cm/s   cm/s                  cm/s                   LVOT Mean Velocity: 56.6 cm/s   MV Peak A-Wave: 62.1  AV Peak Gradient: 5.29 LVOT Peak Gradient: 3   cm/s                  mmHg                   mmHgLVOT Mean Gradient: 2   MV E/A Ratio: 1.21    AV Mean Velocity: 81   mmHg   MV Peak Gradient:     cm/s   2.26 mmHg             AV Mean Gradient: 3                         mmHg   MV Deceleration Time: AV VTI: 27.3 cm   165 msec              AV Area                         (Continuity):3.36 cm^2      MV E' Septal          LVOT VTI: 22.1 cm   Velocity: 9.67 cm/s   MV E' Lateral   Velocity: 10.3 cm/s                         E/E': 7.3      Signature      ----------------------------------------------------------------   Electronically signed by KAM VERMA MD(Interpreting   physician) on 11/02/2016 02:46 PM   ----------------------------------------------------------------          Results for orders placed or performed in visit on 10/11/17   ECG Clinic - Today   Result Value Ref Range    SYSTOLIC BLOOD PRESSURE  mmHg    DIASTOLIC BLOOD PRESSURE  mmHg    VENTRICULAR RATE 58 BPM    ATRIAL RATE 58 BPM    P-R INTERVAL 146 ms    QRS DURATION 94 ms    Q-T INTERVAL 446 ms    QTC CALCULATION (BEZET) 437 ms    P Axis 46 degrees    R AXIS 13 degrees    T AXIS 35 degrees    MUSE DIAGNOSIS       Sinus bradycardia  Otherwise normal ECG  When compared with ECG of 12-JUL-2017 06:59,  Sinus rhythm has replaced Atrial fibrillation  Confirmed by RAHUL ABURTO, CRISTOFER LOC:JN ([id number]) on 10/11/2017 4:12:38 PM            Problem List:  Patient Active Problem List   Diagnosis   ? Atrial fibrillation (H)   ? Small bowel obstruction (H)   ? Gastroesophageal reflux disease, esophagitis presence not specified   ? Abnormal LFTs   ?  Hyperglycemia   ? Family history of abdominal aortic aneurysm   ? Hx SBO   ? Hyperlipidemia   ? Overweight       Physical Examination Review of Systems   Vitals:    08/22/19 1043   BP: 110/68   Pulse: 60   Resp: 18     Body mass index is 28 kg/m .  Wt Readings from Last 3 Encounters:   08/22/19 (!) 224 lb (101.6 kg)   08/16/19 220 lb (99.8 kg)   07/25/18 212 lb 3 oz (96.2 kg)     General Appearance:   Alert, well-appearing and in no acute distress.   HEENT: Atraumatic, normocephalic.  No scleral icterus, normal conjunctivae; mucous membranes pink and moist.     Chest: Chest symmetric   Lungs:   Respirations unlabored: Lungs are clear to auscultation.   Cardiovascular:   Normal first and second heart sounds with no murmurs, rubs, or gallops.  Regular, regular. Normal JVD, no edema.   Abdomen:  Soft, nontender, nondistended   Extremities: No cyanosis or clubbing   Musculoskeletal: Moves all extremities   Neurologic: Mood and affect are appropriate, alert and oriented to person, place, time, and situation    General: WNL  Eyes: WNL  Ears/Nose/Throat: WNL  Lungs: WNL  Heart: WNL  Stomach: WNL  Bladder: WNL  Muscle/Joints: WNL  Skin: WNL  Nervous System: WNL  Mental Health: WNL     Blood: WNL       Medical History  Surgical History Family History Social History   Past Medical History:   Diagnosis Date   ? A-fib (H)    ? H/O abdominal abscess 1/14/2016   ? Small bowel obstruction (H)     Past Surgical History:   Procedure Laterality Date   ? APPENDECTOMY     ? BOWEL RESECTION      with appy, age 9   ? CARDIOVERSION      10/16/16, 4/16/17   ? [place] EPHYS EVL TRNSPTL [place] ATRIAL FIB ISOLAT PULM VEIN Left 7/12/2017    Procedure: EP Ablation PVI  RF to 4 PV;  Surgeon: Celina Marquez MD;     Family History   Problem Relation Age of Onset   ? Atrial fibrillation Mother    ? Aortic aneurysm Father    ? Atrial fibrillation Sister    ? Atrial fibrillation Brother     Social History     Socioeconomic History   ? Marital  status:      Spouse name: Not on file   ? Number of children: Not on file   ? Years of education: Not on file   ? Highest education level: Not on file   Occupational History   ? Not on file   Social Needs   ? Financial resource strain: Not on file   ? Food insecurity:     Worry: Not on file     Inability: Not on file   ? Transportation needs:     Medical: Not on file     Non-medical: Not on file   Tobacco Use   ? Smoking status: Former Smoker     Types: Cigarettes, Cigars   ? Smokeless tobacco: Never Used   Substance and Sexual Activity   ? Alcohol use: Yes     Alcohol/week: 7.2 oz     Types: 12 Cans of beer per week   ? Drug use: No   ? Sexual activity: Yes   Lifestyle   ? Physical activity:     Days per week: Not on file     Minutes per session: Not on file   ? Stress: Not on file   Relationships   ? Social connections:     Talks on phone: Not on file     Gets together: Not on file     Attends Scientologist service: Not on file     Active member of club or organization: Not on file     Attends meetings of clubs or organizations: Not on file     Relationship status: Not on file   ? Intimate partner violence:     Fear of current or ex partner: Not on file     Emotionally abused: Not on file     Physically abused: Not on file     Forced sexual activity: Not on file   Other Topics Concern   ? Not on file   Social History Narrative   ? Not on file          Medications  Allergies   Current Outpatient Medications   Medication Sig Dispense Refill   ? apixaban (ELIQUIS) 5 mg Tab tablet Take 1 tablet (5 mg total) by mouth 2 (two) times a day. 60 tablet 0   ? aspirin 81 MG EC tablet Take 1 tablet (81 mg total) by mouth daily.  0   ? cholecalciferol, vitamin D3, 5,000 unit Tab Take 2,000 Units by mouth daily.              No current facility-administered medications for this visit.       Allergies   Allergen Reactions   ? Terbinafine Hives      Medical, surgical, family, social history, and medications were all reviewed  and updated as necessary.   Lab Results    Chemistry CBC/INR CHOLESTROL   Lab Results   Component Value Date    CREATININE 0.91 08/16/2019    BUN 9 08/16/2019     08/16/2019    K 3.7 08/16/2019     (H) 08/16/2019    CO2 22 08/16/2019     Creatinine (mg/dL)   Date Value   08/16/2019 0.91   07/26/2018 0.85   07/24/2018 1.29   07/12/2017 0.82     No results found for: BNP Lab Results   Component Value Date    WBC 6.8 08/16/2019    HGB 15.2 08/16/2019    HCT 43.6 08/16/2019    MCV 93 08/16/2019     08/16/2019     No results found for: INR   No results found for: CHOL, HDL, LDLCALC, TRIG       This note has been dictated using voice recognition software. Any grammatical, typographical, or context distortions are unintentional and inherent to the software.    JONNY Morgan  [facility]   Electrophysiology  [phone]

## 2021-06-28 NOTE — PROGRESS NOTES
Progress Notes by Arjun Mccann MD at 9/30/2019 10:50 AM     Author: Arjun Mccann MD Service: -- Author Type: Physician    Filed: 9/30/2019  6:04 PM Encounter Date: 9/30/2019 Status: Signed    : Arjun Mccann MD (Physician)           Click to link to [facility] Heart [facility] HEART CARE NOTE    Thank you, Hector Harris MD, for asking the [facility] team to see Mr. Rogers Martinez to evaluate recurrent paroxysmal atrial fibrillation.      Assessment/Recommendations   Assessment and recommendations:     Recurrent PAF, status post PVI in November 2017.  No recurrence since presentation to emergency room in August 2019 when he was managed with electrical cardioversion and anti-correlation for 1 month.  No recurrent AF/related symptoms since then, aware of the option of pill-in the pocket flecainide use.  I met with Mr. Martinez today and reviewed the management options for his recurrent paroxysmal AF including continued monitoring with pill in the pocket strategy versus redo catheter ablation.  The benefits and risks of each approach were discussed in great details.   He clearly understands the mechanisms and natural history of atrial fibrillation.  At this time, he would like to continue monitoring for at least another 6-months but he will definitely consider repeat catheter ablation of AF recurs again, which is very reasonable.    Currently, his stroke and thromboembolic risk is very low.  His chadvasc score is 0, therefore, there is no definite indication for anti-coagulation.   He would like to return to EP clinic on a as needed basis.      Thank you for allowing me to part spent in the care of Mr. Martinez.        History of Present Illness/Subjective    Mr. Rogers Martinez is a 59 y.o. male works as a , with history of paroxysmal atrial fibrillation, highly symptomatic and flecainide refractory underwent left atrial ablation and pulmonary veins  isolation in November 2017, with significant relief from AF recurrences for about 18-months, until August 2019 when he presented to the emergency room with atrial fibrillation and rapid ventricular response prompted electrical cardioversion.  After that he completed a 3 0 days course of apixaban 5 mg twice daily for stroke and thromboembolic prophylaxis.   Most recently, evaluated on August 22, 2019 by Nancy Santos CNP, pill in pocket flecainide was recommended and referral to EP in the clinic was discussed candidacy for repeat ablation.   Since his cardioversion in August, there will has not experienced any recurrences of palpitations or atrial for ablation.  He continues being very active, plays hockey and runs without limitations.  Denies any chest pain, shortness of breath, lightheadedness, dizziness, stroke- like symptoms.   Describes a family history of early onset of atrial fibrillation several family members.   He admits to consuming about 12 drinks per week.  He does not smoke.  He confirms that he recently underwent a sleep evaluation that ruled out obstructive sleep apnea.       Physical Examination Review of Systems   Vitals:    09/30/19 1107   BP: 128/80   Pulse: 64   Resp: 16     Body mass index is 27.64 kg/m .  Wt Readings from Last 3 Encounters:   09/30/19 221 lb 1.6 oz (100.3 kg)   08/27/19 (!) 244 lb (110.7 kg)   08/22/19 (!) 224 lb (101.6 kg)     [unfilled]    General     Appearance:   The patient is alert oriented to person place and situation.    The patient is in no acute distress at the time of my evaluation.   HEENT:  Pupils are equal, round, and reactive to light.  Conjunctiva and sclera are clear.  ENT: Oral mucosa is moist and without redness. No evident nasal discharge.   Neck: Without palpable thyroid or appreciable lymph nodes.   Chest: Symmetric, without deformity.   Lungs:   Clear bilaterally with no rales, rhonchi, or wheezes.     Cardiovascular:   Rhythm is regular. S1 and  S2 are normal. No significant murmur is present. JVP is normal. Lower extremities demonstrate no significant edema. Distal pulses are intact bilaterally.   Abdomen:  Abdomen isflat, soft, and nontender.   Extremities: Without deformity.   Skin: Skin is warm, dry, and otherwise intact.   Neurologic: Gait is normal.           A 12 point comprehensive review of systems was  negative except as noted.      Medical History  Surgical History Family History Social History   Past Medical History:   Diagnosis Date   ? A-fib (H)    ? H/O abdominal abscess 1/14/2016   ? Small bowel obstruction (H)     Past Surgical History:   Procedure Laterality Date   ? APPENDECTOMY     ? BOWEL RESECTION      with appy, age 9   ? CARDIOVERSION      10/16/16, 4/16/17, 8/16/19   ? [place] EPHYS EVL TRNSPTL [place] ATRIAL FIB ISOLAT PULM VEIN Left 7/12/2017    Procedure: EP Ablation PVI  RF to 4 PV;  Surgeon: Celina Marquez MD;     Family History   Problem Relation Age of Onset   ? Atrial fibrillation Mother    ? Aortic aneurysm Father    ? Atrial fibrillation Sister    ? Atrial fibrillation Brother     Social History     Socioeconomic History   ? Marital status:      Spouse name: Not on file   ? Number of children: Not on file   ? Years of education: Not on file   ? Highest education level: Not on file   Occupational History   ? Not on file   Social Needs   ? Financial resource strain: Not on file   ? Food insecurity:     Worry: Not on file     Inability: Not on file   ? Transportation needs:     Medical: Not on file     Non-medical: Not on file   Tobacco Use   ? Smoking status: Former Smoker     Types: Cigarettes, Cigars   ? Smokeless tobacco: Never Used   Substance and Sexual Activity   ? Alcohol use: Yes     Alcohol/week: 12.0 standard drinks     Types: 12 Cans of beer per week   ? Drug use: No   ? Sexual activity: Yes   Lifestyle   ? Physical activity:     Days per week: Not on file     Minutes per session: Not on file   ? Stress:  Not on file   Relationships   ? Social connections:     Talks on phone: Not on file     Gets together: Not on file     Attends Adventist service: Not on file     Active member of club or organization: Not on file     Attends meetings of clubs or organizations: Not on file     Relationship status: Not on file   ? Intimate partner violence:     Fear of current or ex partner: Not on file     Emotionally abused: Not on file     Physically abused: Not on file     Forced sexual activity: Not on file   Other Topics Concern   ? Not on file   Social History Narrative   ? Not on file          Medications  Allergies     Current Outpatient Medications:   ?  cholecalciferol, vitamin D3, 5,000 unit Tab, Take 2,000 Units by mouth daily.    , Disp: , Rfl:   ?  apixaban (ELIQUIS) 5 mg Tab tablet, Take 1 tablet (5 mg total) by mouth 2 (two) times a day., Disp: 60 tablet, Rfl: 0  ?  flecainide (TAMBOCOR) 50 MG tablet, Take 1 tablet (50 mg total) by mouth daily as needed. At onset of AF sx., Disp: 10 tablet, Rfl: 0   Allergies   Allergen Reactions   ? Terbinafine Hives         Lab Results    Chemistry/lipid CBC Cardiac Enzymes/BNP/TSH/INR   Lab Results   Component Value Date    CREATININE 0.91 08/16/2019    BUN 9 08/16/2019    K 3.7 08/16/2019     08/16/2019     (H) 08/16/2019    CO2 22 08/16/2019    Lab Results   Component Value Date    WBC 6.8 08/16/2019    HGB 15.2 08/16/2019    HCT 43.6 08/16/2019    MCV 93 08/16/2019     08/16/2019    Lab Results   Component Value Date    TROPONINI <0.01 08/16/2019    TSH 1.76 08/16/2019          Arjun Mccann MD, RS  Cardiac Electrophysiology

## 2021-07-03 NOTE — ADDENDUM NOTE
Addendum Note by Zoya You RN at 7/11/2017 12:10 PM     Author: Zoya You RN Service: -- Author Type: Registered Nurse    Filed: 7/11/2017 12:10 PM Encounter Date: 6/21/2017 Status: Signed    : Zoya You RN (Registered Nurse)    Addended by: ZOYA YOU on: 7/11/2017 12:10 PM        Modules accepted: Orders

## 2021-07-04 ENCOUNTER — HEALTH MAINTENANCE LETTER (OUTPATIENT)
Age: 61
End: 2021-07-04

## 2021-10-24 ENCOUNTER — HEALTH MAINTENANCE LETTER (OUTPATIENT)
Age: 61
End: 2021-10-24

## 2022-03-30 ENCOUNTER — HOSPITAL ENCOUNTER (EMERGENCY)
Facility: HOSPITAL | Age: 62
Discharge: HOME | End: 2022-03-30
Attending: EMERGENCY MEDICINE | Admitting: EMERGENCY MEDICINE
Payer: COMMERCIAL

## 2022-03-30 VITALS
SYSTOLIC BLOOD PRESSURE: 112 MMHG | WEIGHT: 220 LBS | BODY MASS INDEX: 27.35 KG/M2 | RESPIRATION RATE: 19 BRPM | OXYGEN SATURATION: 99 % | HEART RATE: 62 BPM | TEMPERATURE: 96.8 F | DIASTOLIC BLOOD PRESSURE: 75 MMHG | HEIGHT: 75 IN

## 2022-03-30 DIAGNOSIS — I48.91 RAPID ATRIAL FIBRILLATION (H): ICD-10-CM

## 2022-03-30 LAB
ANION GAP SERPL CALCULATED.3IONS-SCNC: 10 MMOL/L (ref 5–18)
ATRIAL RATE - MUSE: 64 BPM
ATRIAL RATE - MUSE: 75 BPM
BUN SERPL-MCNC: 13 MG/DL (ref 8–22)
CALCIUM SERPL-MCNC: 8.6 MG/DL (ref 8.5–10.5)
CHLORIDE BLD-SCNC: 107 MMOL/L (ref 98–107)
CO2 SERPL-SCNC: 21 MMOL/L (ref 22–31)
CREAT SERPL-MCNC: 1.01 MG/DL (ref 0.7–1.3)
DIASTOLIC BLOOD PRESSURE - MUSE: 71 MMHG
DIASTOLIC BLOOD PRESSURE - MUSE: NORMAL MMHG
ERYTHROCYTE [DISTWIDTH] IN BLOOD BY AUTOMATED COUNT: 13.2 % (ref 10–15)
GFR SERPL CREATININE-BSD FRML MDRD: 84 ML/MIN/1.73M2
GLUCOSE BLD-MCNC: 110 MG/DL (ref 70–125)
HCT VFR BLD AUTO: 45.8 % (ref 40–53)
HGB BLD-MCNC: 15.6 G/DL (ref 13.3–17.7)
INTERPRETATION ECG - MUSE: NORMAL
INTERPRETATION ECG - MUSE: NORMAL
MAGNESIUM SERPL-MCNC: 2 MG/DL (ref 1.8–2.6)
MCH RBC QN AUTO: 32.1 PG (ref 26.5–33)
MCHC RBC AUTO-ENTMCNC: 34.1 G/DL (ref 31.5–36.5)
MCV RBC AUTO: 94 FL (ref 78–100)
P AXIS - MUSE: 63 DEGREES
P AXIS - MUSE: NORMAL DEGREES
PLATELET # BLD AUTO: 254 10E3/UL (ref 150–450)
POTASSIUM BLD-SCNC: 4.5 MMOL/L (ref 3.5–5)
PR INTERVAL - MUSE: 156 MS
PR INTERVAL - MUSE: NORMAL MS
QRS DURATION - MUSE: 84 MS
QRS DURATION - MUSE: 86 MS
QT - MUSE: 320 MS
QT - MUSE: 392 MS
QTC - MUSE: 404 MS
QTC - MUSE: 470 MS
R AXIS - MUSE: 55 DEGREES
R AXIS - MUSE: 58 DEGREES
RBC # BLD AUTO: 4.86 10E6/UL (ref 4.4–5.9)
SODIUM SERPL-SCNC: 138 MMOL/L (ref 136–145)
SYSTOLIC BLOOD PRESSURE - MUSE: 127 MMHG
SYSTOLIC BLOOD PRESSURE - MUSE: NORMAL MMHG
T AXIS - MUSE: 60 DEGREES
T AXIS - MUSE: 69 DEGREES
TROPONIN I SERPL-MCNC: 0.03 NG/ML (ref 0–0.29)
VENTRICULAR RATE- MUSE: 130 BPM
VENTRICULAR RATE- MUSE: 64 BPM
WBC # BLD AUTO: 5.9 10E3/UL (ref 4–11)

## 2022-03-30 PROCEDURE — 85027 COMPLETE CBC AUTOMATED: CPT | Performed by: EMERGENCY MEDICINE

## 2022-03-30 PROCEDURE — 84484 ASSAY OF TROPONIN QUANT: CPT | Performed by: EMERGENCY MEDICINE

## 2022-03-30 PROCEDURE — 999N000055 HC STATISTIC END TITIAL CO2 MONITORING

## 2022-03-30 PROCEDURE — 83735 ASSAY OF MAGNESIUM: CPT | Performed by: EMERGENCY MEDICINE

## 2022-03-30 PROCEDURE — 93005 ELECTROCARDIOGRAM TRACING: CPT | Mod: 59 | Performed by: EMERGENCY MEDICINE

## 2022-03-30 PROCEDURE — 250N000011 HC RX IP 250 OP 636: Performed by: EMERGENCY MEDICINE

## 2022-03-30 PROCEDURE — 99285 EMERGENCY DEPT VISIT HI MDM: CPT | Mod: 25

## 2022-03-30 PROCEDURE — 80048 BASIC METABOLIC PNL TOTAL CA: CPT | Performed by: EMERGENCY MEDICINE

## 2022-03-30 PROCEDURE — 999N000157 HC STATISTIC RCP TIME EA 10 MIN

## 2022-03-30 PROCEDURE — 36415 COLL VENOUS BLD VENIPUNCTURE: CPT | Performed by: EMERGENCY MEDICINE

## 2022-03-30 PROCEDURE — 92960 CARDIOVERSION ELECTRIC EXT: CPT

## 2022-03-30 RX ORDER — PROPOFOL 10 MG/ML
1 INJECTION, EMULSION INTRAVENOUS ONCE
Status: COMPLETED | OUTPATIENT
Start: 2022-03-30 | End: 2022-03-30

## 2022-03-30 RX ADMIN — PROPOFOL 80 MG: 10 INJECTION, EMULSION INTRAVENOUS at 08:26

## 2022-03-30 NOTE — DISCHARGE INSTRUCTIONS
You were found to be in atrial fibrillation today.  You were cardioverted and are now back in a normal heart rhythm.  Follow-up with the atrial fibrillation cardiology clinic.  A referral has been placed and they should be contacting you to make this appointment.  Return to the ER for any worsening symptoms or other concerns.

## 2022-03-30 NOTE — PROGRESS NOTES
Assisted with monitoring oxygen, ventilation and airway during conscious sedation for cardioversion.  PT was on NC 5L throughout the procedure with SpO2 100% End-tidal CO2 was continuously monitored and in the low 30s. He  maintained his airway.  Suction, ambu and emergency airways were available and on stand-by. PT woke up and was weaned back to room air.    Gregorio Engle, RT  3/30/2022

## 2022-03-30 NOTE — ED TRIAGE NOTES
Patient arrives by private car for evaluation of palpitations that started about 2030 last night.  Patient reports history of afib and believes that he is in it again.  Reports associated light headedness.

## 2022-03-30 NOTE — ED PROVIDER NOTES
EMERGENCY DEPARTMENT ENCOUnter      NAME: Rogers Martinez  AGE: 61 year old male  YOB: 1960  MRN: [id number]  EVALUATION DATE & TIME: 3/30/2022  7:24 AM    PCP: Hector Zepeda    ED PROVIDER: Rip Foreman DO      Chief Complaint   Patient presents with     Palpitations     Atrial Fib         FINAL IMPRESSION:  1. Rapid atrial fibrillation (H)          ED COURSE & MEDICAL DECISION MAKIN:26 AM I met with patient for initial interview and exam in room 3. PPE includes surgical mask, protective glasses, gloves.  8:23 AM I performed a cardioversion. Successfully converted to sinus rhythm.  8:51 AM We discussed plans for discharge including supportive cares, symptomatic treatment, outpatient follow up, and reasons to return to the emergency department.     The patient presented emergency room today with complaints of palpitations.  He has a history of intermittent A. fib in the past and has had cardioversions as well.  He states that his symptoms began abruptly yesterday evening.  Given the timing of the onset of his symptoms I felt that a cardioversion was safe.  He was agreeable to this.  Laboratory testing is unremarkable.  He was sedated with propofol and underwent a successful cardioversion.  He had resolution of his atrial fibrillation and will be discharged home with cardiology follow-up.  He is comfortable with this plan.    The patient is critically ill and has required 30 minutes of critical care time exclusive of procedures. This includes time spent interviewing the patient, ordering tests and medications, monitoring vital signs, reviewing results, patient updates, discussing the case with family and consultants, and admission.        At the conclusion of the encounter I discussed the results of all of the tests and the disposition. The questions were answered. The patient or family acknowledged understanding and was agreeable with the care plan.              =================================================================    HPI        Rogers Martinez is a 61 year old male with a pertinent history of atrial fibrillation, SBO, and GERD who presents to this ED by walk in for evaluation of palpitations.    Starting at 8/8:30 PM last night, the patient had a sudden onset of palpitations, lightheadedness, and very minimal chest pain. This feels similar to previous episodes of atrial fibrillation. His most recent episode was x2 years ago. About x4 years ago, he had an ablation. He has had cardioversion's in the past with success. Denies shortness of breath and any other complaints.    REVIEW OF SYSTEMS     Constitutional:  Denies fever or chills  HENT:  Denies sore throat   Respiratory:  Denies cough or shortness of breath   Cardiovascular:  Positive for chest pain (minimal) and palpitations  GI:  Denies abdominal pain, nausea, or vomiting  Musculoskeletal:  Denies any new extremity pain   Skin:  Denies rash   Neurologic:  Denies headache, focal weakness or sensory changes Positive for lightheadedness   All other systems reviewed and are negative      PAST MEDICAL HISTORY:  History reviewed. No pertinent past medical history.    PAST SURGICAL HISTORY:  Past Surgical History:   Procedure Laterality Date     APPENDECTOMY       BOWEL RESECTION      with appy, age 9     CARDIOVERSION      10/16/16, 4/16/17, 8/16/19     IA EPHYS EVL TRNSPTL TX ATRIAL FIB ISOLAT PULM VEIN Left 7/12/2017    Procedure: EP Ablation PVI  RF to 4 PV;  Surgeon: Celina Marquez MD;            CURRENT MEDICATIONS:    cholecalciferol, vitamin D3, 5,000 unit Tab  flecainide (TAMBOCOR) 50 MG tablet        ALLERGIES:  Allergies   Allergen Reactions     Terbinafine Hives       FAMILY HISTORY:  Family History   Problem Relation Age of Onset     Atrial fibrillation Mother      Aortic aneurysm Father      Atrial fibrillation Sister      Atrial fibrillation Brother        SOCIAL HISTORY:  "  Social History     Socioeconomic History     Marital status:      Spouse name: None     Number of children: None     Years of education: None     Highest education level: None   Occupational History     None   Tobacco Use     Smoking status: Former Smoker     Types: Cigarettes, Cigars, Cigarettes, Cigars     Smokeless tobacco: Never Used   Substance and Sexual Activity     Alcohol use: Yes     Alcohol/week: 12.0 standard drinks     Drug use: No     Sexual activity: Yes   Other Topics Concern     None   Social History Narrative     None     Social Determinants of Health     Financial Resource Strain: Not on file   Food Insecurity: Not on file   Transportation Needs: Not on file   Physical Activity: Not on file   Stress: Not on file   Social Connections: Not on file   Intimate Partner Violence: Not on file   Housing Stability: Not on file       VITALS:  Patient Vitals for the past 24 hrs:   BP Temp Temp src Pulse Resp SpO2 Height Weight   03/30/22 0915 112/75 -- -- -- -- -- -- --   03/30/22 0900 118/74 -- -- 62 19 99 % -- --   03/30/22 0845 110/69 -- -- 59 16 98 % -- --   03/30/22 0835 -- -- -- 64 16 96 % -- --   03/30/22 0830 113/66 -- -- 61 15 99 % -- --   03/30/22 0825 127/71 -- -- (!) 134 14 100 % -- --   03/30/22 0820 127/73 -- -- (!) 125 16 100 % -- --   03/30/22 0815 120/60 96.8  F (36  C) Oral 118 22 100 % -- --   03/30/22 0815 120/66 -- -- (!) 124 12 100 % -- --   03/30/22 0810 -- -- -- (!) 130 21 100 % -- --   03/30/22 0800 93/75 -- -- (!) 130 14 96 % -- --   03/30/22 0721 (!) 144/111 96.8  F (36  C) Tympanic (!) 125 18 99 % 1.905 m (6' 3\") 99.8 kg (220 lb)       PHYSICAL EXAM    Constitutional:  Well developed, Well nourished,  HENT:  Normocephalic, Atraumatic, Bilateral external ears normal, Oropharynx moist, Nose normal.   Neck:  Normal range of motion, No meningismus, No stridor.   Eyes:  EOMI, Conjunctiva normal, No discharge.   Respiratory:  Normal breath sounds, No respiratory distress, No " wheezing, No chest tenderness.   Cardiovascular: No murmurs Irregularly irregular rhythm. Tachycardic.  GI:  Soft, No tenderness, No guarding, No CVA tenderness.   Musculoskeletal:  Neurovascularly intact distally, No edema, No tenderness, No cyanosis, Good range of motion in all major joints. No tenderness to palpation or major deformities noted.   Integument:  Warm, Dry, No erythema, No rash.   Lymphatic:  No lymphadenopathy noted.   Neurologic:  Alert & oriented x 3, Normal motor function, Normal sensory function, No focal deficits noted.   Psychiatric:  Affect normal, Judgment normal, Mood normal.      LAB:  All pertinent labs reviewed and interpreted.  Results for orders placed or performed during the hospital encounter of 03/30/22   CBC with platelets   Result Value Ref Range    WBC Count 5.9 4.0 - 11.0 10e3/uL    RBC Count 4.86 4.40 - 5.90 10e6/uL    Hemoglobin 15.6 13.3 - 17.7 g/dL    Hematocrit 45.8 40.0 - 53.0 %    MCV 94 78 - 100 fL    MCH 32.1 26.5 - 33.0 pg    MCHC 34.1 31.5 - 36.5 g/dL    RDW 13.2 10.0 - 15.0 %    Platelet Count 254 150 - 450 10e3/uL   Basic metabolic panel   Result Value Ref Range    Sodium 138 136 - 145 mmol/L    Potassium 4.5 3.5 - 5.0 mmol/L    Chloride 107 98 - 107 mmol/L    Carbon Dioxide (CO2) 21 (L) 22 - 31 mmol/L    Anion Gap 10 5 - 18 mmol/L    Urea Nitrogen 13 8 - 22 mg/dL    Creatinine 1.01 0.70 - 1.30 mg/dL    Calcium 8.6 8.5 - 10.5 mg/dL    Glucose 110 70 - 125 mg/dL    GFR Estimate 84 >60 mL/min/1.73m2   Result Value Ref Range    Troponin I 0.03 0.00 - 0.29 ng/mL   Result Value Ref Range    Magnesium 2.0 1.8 - 2.6 mg/dL     EKG:    EKG #1 at 7:55 AM: Rapid atrial fibrillation at 130 bpm.  Normal axis.  No signs of acute ischemia.  QRS 84 ms,  ms  EKG #2 at 8:27 AM: Normal sinus rhythm at 64 bpm.  Normal axis.  No signs of acute ischemia.  QRS 86 ms,  ms.    I have independently reviewed and interpreted this EKG    Procedures    PROCEDURE: Electrical  Cardioversion with Procedural Sedation   INDICATIONS: Sedation is required to allow for Cardioversion for Atrial Fibrilation    CARDIOVERSION TYPE: Biphasic, External   SEDATION PROVIDER: Dr Rip Foreman   CARDIOVERSION PROVIDER: Dr Rip Foreman   LEVEL OF SEDATION: Deep Sedation    Defined as:  Minimal = Normal response to verbal  Moderate = Responds to verbal and light tactile stimulation  Deep = Responds after repeated painful stimulation   CONSENT: Risks, benefits and alternatives were discussed with and Written consent was obtained from Patient.   PROCEDURE SPECIFIC CHECKLIST COMPLETED: Yes   LAST ORAL INTAKE: Regular Meal > 8 hours   ASA CLASS: 1 - Healthy patient, no medical problems   MALLAMPATI:  I - Faucial pillars, soft palate, and uvula are visible   TIME OUT: Universal protocol was followed. TIME OUT conducted just prior to starting procedure confirmed patient identity, site/side, procedure, patient position, and availability of correct equipment. Yes    Immediately prior to initiation of sedation, reassessment of clinical condition was performed which was unchanged.   MEDICATIONS GIVEN: Propofol, 70 mg, IV    MONITORING: heart rate, cardiac monitor, continuous pulse oximeter, continuous capnometry (end tidal CO2), frequent blood pressure checks, level of consciousness checks, IV access, constant attendance by RN until patient is recovered and constant attendance by MD until patient is stable   RESPONSE: vital signs stable, airway patent and O2 saturations remained >92%   POST-SEDATION ASSESSMENT/PROCEDURE NOTE: CARDIOVERSION: Trial 1: Synchronized shock at 200 joules was Successful    SEDATION:  Lowest level oxygen saturation reached was 92%.    Post procedure patient was alert and responds to verbal stimuli    Patient was monitored during recovery and returned to pre-procedure baseline.   TOTAL MD DRUG ADMINISTRATION / MONITORING TIME: 10 minutes.   COMPLICATIONS: Patient tolerated procedure well,  without complication       I, Africa Cabrera, am serving as a scribe to document services personally performed by Dr. Foreman based on my observation and the provider's statements to me. I, Rip Foreman, DO attest that Africa Cabrera is acting in a scribe capacity, has observed my performance of the services and has documented them in accordance with my direction.    Rip Foreman, DO  Emergency Medicine  [facility] EMERGENCY DEPARTMENT  [facility] [facility] [id number] [phone]  Dept: [phone]       Rip Foreman MD  04/04/22 [id number]

## 2022-04-08 ENCOUNTER — HOSPITAL ENCOUNTER (EMERGENCY)
Facility: HOSPITAL | Age: 62
Discharge: HOME | End: 2022-04-08
Attending: EMERGENCY MEDICINE | Admitting: EMERGENCY MEDICINE
Payer: COMMERCIAL

## 2022-04-08 VITALS
RESPIRATION RATE: 18 BRPM | HEIGHT: 75 IN | DIASTOLIC BLOOD PRESSURE: 71 MMHG | HEART RATE: 62 BPM | SYSTOLIC BLOOD PRESSURE: 120 MMHG | OXYGEN SATURATION: 96 % | BODY MASS INDEX: 27.98 KG/M2 | WEIGHT: 225 LBS | TEMPERATURE: 98 F

## 2022-04-08 DIAGNOSIS — I48.91 RAPID ATRIAL FIBRILLATION (H): ICD-10-CM

## 2022-04-08 LAB
ANION GAP SERPL CALCULATED.3IONS-SCNC: 11 MMOL/L (ref 5–18)
BUN SERPL-MCNC: 11 MG/DL (ref 8–22)
CALCIUM SERPL-MCNC: 8.6 MG/DL (ref 8.5–10.5)
CHLORIDE BLD-SCNC: 110 MMOL/L (ref 98–107)
CO2 SERPL-SCNC: 20 MMOL/L (ref 22–31)
CREAT SERPL-MCNC: 1 MG/DL (ref 0.7–1.3)
ERYTHROCYTE [DISTWIDTH] IN BLOOD BY AUTOMATED COUNT: 13.4 % (ref 10–15)
GFR SERPL CREATININE-BSD FRML MDRD: 85 ML/MIN/1.73M2
GLUCOSE BLD-MCNC: 103 MG/DL (ref 70–125)
HCT VFR BLD AUTO: 43.9 % (ref 40–53)
HGB BLD-MCNC: 15 G/DL (ref 13.3–17.7)
HOLD SPECIMEN: NORMAL
MAGNESIUM SERPL-MCNC: 1.9 MG/DL (ref 1.8–2.6)
MCH RBC QN AUTO: 32.2 PG (ref 26.5–33)
MCHC RBC AUTO-ENTMCNC: 34.2 G/DL (ref 31.5–36.5)
MCV RBC AUTO: 94 FL (ref 78–100)
PLATELET # BLD AUTO: 269 10E3/UL (ref 150–450)
POTASSIUM BLD-SCNC: 3.9 MMOL/L (ref 3.5–5)
RBC # BLD AUTO: 4.66 10E6/UL (ref 4.4–5.9)
SODIUM SERPL-SCNC: 141 MMOL/L (ref 136–145)
TROPONIN I SERPL-MCNC: 0.02 NG/ML (ref 0–0.29)
TSH SERPL DL<=0.005 MIU/L-ACNC: 2.95 UIU/ML (ref 0.3–5)
WBC # BLD AUTO: 6.2 10E3/UL (ref 4–11)

## 2022-04-08 PROCEDURE — 84443 ASSAY THYROID STIM HORMONE: CPT | Performed by: EMERGENCY MEDICINE

## 2022-04-08 PROCEDURE — 80048 BASIC METABOLIC PNL TOTAL CA: CPT | Performed by: EMERGENCY MEDICINE

## 2022-04-08 PROCEDURE — 83735 ASSAY OF MAGNESIUM: CPT | Performed by: EMERGENCY MEDICINE

## 2022-04-08 PROCEDURE — 85027 COMPLETE CBC AUTOMATED: CPT | Performed by: EMERGENCY MEDICINE

## 2022-04-08 PROCEDURE — 96374 THER/PROPH/DIAG INJ IV PUSH: CPT | Mod: 59

## 2022-04-08 PROCEDURE — 84484 ASSAY OF TROPONIN QUANT: CPT | Performed by: EMERGENCY MEDICINE

## 2022-04-08 PROCEDURE — 93005 ELECTROCARDIOGRAM TRACING: CPT | Performed by: EMERGENCY MEDICINE

## 2022-04-08 PROCEDURE — 93005 ELECTROCARDIOGRAM TRACING: CPT | Mod: 59

## 2022-04-08 PROCEDURE — 99285 EMERGENCY DEPT VISIT HI MDM: CPT | Mod: 25

## 2022-04-08 PROCEDURE — 250N000011 HC RX IP 250 OP 636: Performed by: EMERGENCY MEDICINE

## 2022-04-08 PROCEDURE — 272N000240 HC CARDIOVERT/DEFIB/PACER SUPP

## 2022-04-08 PROCEDURE — 36415 COLL VENOUS BLD VENIPUNCTURE: CPT | Performed by: EMERGENCY MEDICINE

## 2022-04-08 PROCEDURE — 92960 CARDIOVERSION ELECTRIC EXT: CPT

## 2022-04-08 RX ORDER — FLUMAZENIL 0.1 MG/ML
0.2 INJECTION, SOLUTION INTRAVENOUS
Status: DISCONTINUED | OUTPATIENT
Start: 2022-04-08 | End: 2022-04-08 | Stop reason: HOSPADM

## 2022-04-08 RX ORDER — PROPOFOL 10 MG/ML
70 INJECTION, EMULSION INTRAVENOUS ONCE
Status: COMPLETED | OUTPATIENT
Start: 2022-04-08 | End: 2022-04-08

## 2022-04-08 RX ORDER — NALOXONE HYDROCHLORIDE 0.4 MG/ML
0.2 INJECTION, SOLUTION INTRAMUSCULAR; INTRAVENOUS; SUBCUTANEOUS
Status: DISCONTINUED | OUTPATIENT
Start: 2022-04-08 | End: 2022-04-08 | Stop reason: HOSPADM

## 2022-04-08 RX ORDER — NALOXONE HYDROCHLORIDE 0.4 MG/ML
0.4 INJECTION, SOLUTION INTRAMUSCULAR; INTRAVENOUS; SUBCUTANEOUS
Status: DISCONTINUED | OUTPATIENT
Start: 2022-04-08 | End: 2022-04-08 | Stop reason: HOSPADM

## 2022-04-08 RX ORDER — METOPROLOL SUCCINATE 25 MG/1
25 TABLET, EXTENDED RELEASE ORAL DAILY
Qty: 30 TABLET | Refills: 0 | Status: SHIPPED | OUTPATIENT
Start: 2022-04-08 | End: 2022-05-11

## 2022-04-08 RX ADMIN — PROPOFOL 70 MG: 10 INJECTION, EMULSION INTRAVENOUS at 08:49

## 2022-04-08 ASSESSMENT — ENCOUNTER SYMPTOMS
SHORTNESS OF BREATH: 1
VOMITING: 0
DIARRHEA: 0
PALPITATIONS: 1
COUGH: 0
ABDOMINAL PAIN: 0
TROUBLE SWALLOWING: 0
FEVER: 0
DYSURIA: 0
NAUSEA: 0

## 2022-04-08 NOTE — ED TRIAGE NOTES
Patient reports A-fib starting last night at about 8pm. States he feels heart palpations and irregular heart beat. Reports sob with activity.

## 2022-04-08 NOTE — ED PROVIDER NOTES
EMERGENCY DEPARTMENT ENCOUNTER      NAME: Rogers Martinez  AGE: 61 year old male  YOB: 1960  MRN: [id number]  EVALUATION DATE & TIME: 2022  7:10 AM    PCP: Hector Zepeda    ED PROVIDER: Grace Osborne M.D.        Chief Complaint   Patient presents with     Shortness of Breath         FINAL IMPRESSION:    1. Rapid atrial fibrillation (H)            MEDICAL DECISION MAKIN year old male with history of paroxysmal A. fib who presents emergency department with recurrent episode of rapid A. fib.  This is his sixth cardioversion over the past few years.  He was last cardioverted 1 week ago for the same.  He has been in this since about 8 PM last night.  Risks and benefits of cardioversion explained to the patient and patient prefers electrical cardioversion at this time.  Patient tolerated cardioversion well and now in sinus rhythm.  Spoke with cardiology who recommends starting him on metoprolol succinate and Eliquis.  Patient already has an appointment to follow-up with cardiology mid next week for this.  He is otherwise stable and appropriate for discharge.        ED COURSE:  7:23 AM I met with the patient to gather history and perform my exam. ED course and treatment discussed.  Patient is here requesting cardioversion.  I explained the risks and benefits of cardioversion.  Risks including cardiac arrest and asystole as well as ventricular arrhythmias, stroke, and risk of sedation including respiratory compromise and even death.  Patient understands these risks and wants to proceed with cardioversion. He signed the consent form.  Will await laboratory results to be sure there is no contraindication.  Blood pressure wise he is stable to wait for laboratory results.    7:54 AM  Updated patient that we are just waiting for his labs to come back before cardioversion.  He understands.    8:47 AM  Labs look good so far. RT in room preparing for sedation and cardioversion.    8:59  AM  Cardioversion was done.  200 J synchronized and 70 mg IV propofol x1.  Patient tolerated well.  Patient now in sinus rhythm.  Patient now awake and able to answer questions.  We will continue to monitor post sedation.    9:19 AM I discussed the patient with Dr. Mtz, Cardiology.  He recommends starting the patient on Eliquis 5 mg twice daily and metoprolol succinate 25 mg daily.  We discussed flecainide but since the patient has had an ablation back in 2017 he prefers to stay away from flecainide at this time.  Patient has an appointment to see Dr. Hdz of cardiology mid next week and he feels the patient could keep that appointment.  We will then help make further arrangements for follow-up in A. fib clinic etc. at that Cardiology follow-up appointment.    9:33 AM  I updated patient on his results.  He is feeling much better.  He understands the indication to take the medications and keep the appointment to see cardiology next week.  His wife will come to pick him up.  Hemodynamically stable.  Patient always has a slower heart rate in the 50s and 60s through chart review when he is not in rapid A. fib.  That is why we chose the lower end of metoprolol succinate with the cardiologist.  Blood pressure looks great at 120/71.  Neuro exam remains normal.    I do not think that this represents rib fractures, myocarditis, pericarditis, endocarditis, ACS, PE, ruptured AAA, pneumothorax, aortic dissection, bowel obstruction, bowel ischemia, cholecystitis, kidney stone, pyelonephritis, or other such etiologies at this time.  At this time I feel that this patient can be discharged from the emergency department and can followup as directed.      COVID-19 PPE worn during patient evaluation:  Mask: n95 and homemade masks   Eye Protection: goggles   Gown: none   Hair cover: yes  Face shield: none   Patient wearing a mask: yes     At the conclusion of the encounter I discussed the results of all of the tests and the  disposition. Their questions were answered. The patient (and any family present) acknowledged understanding and were agreeable with the care plan.      CONSULTANTS:  Cardiology - Dr. Mtz        MEDICATIONS GIVEN IN THE EMERGENCY:  Medications   naloxone (NARCAN) injection 0.2 mg (has no administration in time range)   naloxone (NARCAN) injection 0.4 mg (has no administration in time range)   naloxone (NARCAN) injection 0.2 mg (has no administration in time range)   naloxone (NARCAN) injection 0.4 mg (has no administration in time range)   flumazenil (ROMAZICON) injection 0.2 mg (0.2 mg Intravenous Not Given 4/8/22 0913)   propofol (DIPRIVAN) injection 10 mg/mL vial (70 mg Intravenous Given 4/8/22 0849)           NEW PRESCRIPTIONS STARTED AT TODAY'S ER VISIT     Medication List      Started    apixaban ANTICOAGULANT 5 MG tablet  Commonly known as: ELIQUIS ANTICOAGULANT  5 mg, Oral, 2 TIMES DAILY     metoprolol succinate ER 25 MG 24 hr tablet  Commonly known as: TOPROL-XL  25 mg, Oral, DAILY                CONDITION:  Stable, improve        DISPOSITION:  discharge home with ride         =================================================================  =================================================================    HPI    Patient information was obtained from: patient    Use of Intrepreter: N/A     Rogers ONOFRE Martinez is a 61 year old male with history of paroxysmal atrial fibrillation who presents to the ER with complaints of tachycardia.  Feels the palpitations a little short of breath.  Began at 8 PM last night.  He has had cardioversion 5 times in the past and knows that he needs to present within 24 hours of onset of symptoms and therefore presented this morning with the hopes for cardioversion.    He was seen in our ER on March 30 for the same and cardioverted using 70 mg IV propofol and synchronized 200 J for cardioversion with success.  He did not contact his cardiologist after this cardioversion.  He is  due for routine cardiology follow-up next week.  He is not on any anticoagulation or rate control.    No fevers, cough, chest pain, abdominal pain, vomiting, diarrhea, dysuria, hematuria.      REVIEW OF SYSTEMS  Review of Systems   Constitutional: Negative for fever.   HENT: Negative for trouble swallowing.    Respiratory: Positive for shortness of breath. Negative for cough.    Cardiovascular: Positive for palpitations. Negative for chest pain.   Gastrointestinal: Negative for abdominal pain, diarrhea, nausea and vomiting.   Genitourinary: Negative for dysuria.   Allergic/Immunologic: Negative for immunocompromised state.   All other systems reviewed and are negative.        PAST MEDICAL HISTORY:  Past Medical History:   Diagnosis Date     Paroxysmal atrial fibrillation (H)          PAST SURGICAL HISTORY:  Past Surgical History:   Procedure Laterality Date     APPENDECTOMY       BOWEL RESECTION      with appy, age 9     CARDIOVERSION      10/16/16, 4/16/17, 8/16/19     KS EPHYS EVL TRNSPTL TX ATRIAL FIB ISOLAT PULM VEIN Left 7/12/2017    Procedure: EP Ablation PVI  RF to 4 PV;  Surgeon: Celina Marquez MD;          CURRENT MEDICATIONS:    Prior to Admission medications    Medication Sig Start Date End Date Taking? Authorizing Provider   cholecalciferol, vitamin D3, 5,000 unit Tab [CHOLECALCIFEROL, VITAMIN D3, 5,000 UNIT TAB] Take 2,000 Units by mouth daily.        7/24/18   Provider, Historical   flecainide (TAMBOCOR) 50 MG tablet [FLECAINIDE (TAMBOCOR) 50 MG TABLET] Take 1 tablet (50 mg total) by mouth daily as needed. At onset of AF sx. 8/22/19   Nancy Santos NP         ALLERGIES:  Allergies   Allergen Reactions     Terbinafine Hives         FAMILY HISTORY:  Family History   Problem Relation Age of Onset     Atrial fibrillation Mother      Aortic aneurysm Father      Atrial fibrillation Sister      Atrial fibrillation Brother          SOCIAL HISTORY:  Social History     Socioeconomic History      "Marital status:      Spouse name: Not on file     Number of children: Not on file     Years of education: Not on file     Highest education level: Not on file   Occupational History     Not on file   Tobacco Use     Smoking status: Former Smoker     Types: Cigarettes, Cigars, Cigarettes, Cigars     Smokeless tobacco: Never Used   Substance and Sexual Activity     Alcohol use: Yes     Alcohol/week: 12.0 standard drinks     Drug use: No     Sexual activity: Yes   Other Topics Concern     Not on file   Social History Narrative     Not on file     Social Determinants of Health     Financial Resource Strain: Not on file   Food Insecurity: Not on file   Transportation Needs: Not on file   Physical Activity: Not on file   Stress: Not on file   Social Connections: Not on file   Intimate Partner Violence: Not on file   Housing Stability: Not on file         VITALS:  Patient Vitals for the past 24 hrs:   BP Temp Temp src Pulse Resp SpO2 Height Weight   04/08/22 0930 120/71 -- -- 62 18 96 % -- --   04/08/22 0915 104/58 98  F (36.7  C) Oral 63 15 97 % -- --   04/08/22 0905 102/58 -- -- 60 14 98 % -- --   04/08/22 0903 -- -- -- -- 13 -- -- --   04/08/22 0903 117/72 -- -- 61 13 98 % -- --   04/08/22 0900 117/72 -- -- 66 18 98 % -- --   04/08/22 0855 -- -- -- -- 14 -- -- --   04/08/22 0855 104/65 -- -- 63 14 98 % -- --   04/08/22 0853 124/68 -- -- (!) 138 16 -- -- --   04/08/22 0851 -- -- -- -- 14 -- -- --   04/08/22 0845 -- -- -- (!) 144 15 97 % -- --   04/08/22 0830 124/68 -- -- (!) 133 15 96 % -- --   04/08/22 0815 -- -- -- (!) 143 17 95 % -- --   04/08/22 0800 113/59 -- -- (!) 129 14 96 % -- --   04/08/22 0745 -- -- -- (!) 149 16 96 % -- --   04/08/22 0739 -- -- -- (!) 159 -- -- -- --   04/08/22 0730 137/81 -- -- (!) 148 14 95 % -- --   04/08/22 0718 110/68 -- -- (!) 153 -- -- -- --   04/08/22 0715 110/68 -- -- 98 14 99 % -- --   04/08/22 0708 -- 98.5  F (36.9  C) Oral 109 20 95 % 1.905 m (6' 3\") 102.1 kg (225 lb) "       Wt Readings from Last 3 Encounters:   04/08/22 102.1 kg (225 lb)   03/30/22 99.8 kg (220 lb)   09/30/19 100.3 kg (221 lb 1.6 oz)         PHYSICAL EXAM    Constitutional:  Well developed, Well nourished, NAD, GCS 15  HENT:  Normocephalic, Atraumatic, Bilateral external ears normal, Nose normal. Neck- Normal range of motion, No tenderness, Supple, No stridor.   Eyes:  PERRL, EOMI, Conjunctiva normal, No discharge.  Respiratory:  Normal breath sounds, No respiratory distress, No wheezing, Speaks full sentences easily. No cough.   Cardiovascular:  Tachycardia heart rate, irregular rhythm, No murmurs, No rubs, No gallops.   GI:  No excessive obesity.  Bowel sounds normal, Soft, No tenderness, No masses, No flank tenderness. No rebound or guarding.   : deferred  Musculoskeletal: No cyanosis, No clubbing. Good range of motion in all major joints. No major deformities noted.   Integument:  Warm, Dry, No erythema, No rash.  No petechiae.  Neurologic:  Alert & oriented x 3, No focal deficits noted. Normal gait.   Psychiatric:  Affect normal, Cooperative          LAB:  All pertinent labs reviewed and interpreted.  Recent Results (from the past 24 hour(s))   CBC (+ platelets, no diff)    Collection Time: 04/08/22  7:28 AM   Result Value Ref Range    WBC Count 6.2 4.0 - 11.0 10e3/uL    RBC Count 4.66 4.40 - 5.90 10e6/uL    Hemoglobin 15.0 13.3 - 17.7 g/dL    Hematocrit 43.9 40.0 - 53.0 %    MCV 94 78 - 100 fL    MCH 32.2 26.5 - 33.0 pg    MCHC 34.2 31.5 - 36.5 g/dL    RDW 13.4 10.0 - 15.0 %    Platelet Count 269 150 - 450 10e3/uL   Basic metabolic panel    Collection Time: 04/08/22  7:28 AM   Result Value Ref Range    Sodium 141 136 - 145 mmol/L    Potassium 3.9 3.5 - 5.0 mmol/L    Chloride 110 (H) 98 - 107 mmol/L    Carbon Dioxide (CO2) 20 (L) 22 - 31 mmol/L    Anion Gap 11 5 - 18 mmol/L    Urea Nitrogen 11 8 - 22 mg/dL    Creatinine 1.00 0.70 - 1.30 mg/dL    Calcium 8.6 8.5 - 10.5 mg/dL    Glucose 103 70 - 125 mg/dL     GFR Estimate 85 >60 mL/min/1.73m2   Troponin I (now)    Collection Time: 04/08/22  7:28 AM   Result Value Ref Range    Troponin I 0.02 0.00 - 0.29 ng/mL   Magnesium    Collection Time: 04/08/22  7:28 AM   Result Value Ref Range    Magnesium 1.9 1.8 - 2.6 mg/dL   Extra Blue Top Tube    Collection Time: 04/08/22  7:28 AM   Result Value Ref Range    Hold Specimen JIC    Extra Red Top Tube    Collection Time: 04/08/22  7:28 AM   Result Value Ref Range    Hold Specimen JIC    Extra Green Top (Lithium Heparin) Tube    Collection Time: 04/08/22  7:28 AM   Result Value Ref Range    Hold Specimen JIC    Extra Purple Top Tube    Collection Time: 04/08/22  7:28 AM   Result Value Ref Range    Hold Specimen JIC        No results found for: ABORH        RADIOLOGY:  Reviewed all pertinent imaging. Please see official radiology report.    -Cardioversion External    (Results Pending)         EKG:    Indication: Tachycardia   Performed at: 07:15a  Impression: Atrial fibrillation at 149 bpm.  Flipped T waves in aVR.  QRS 86 ms,  ms.  Atrial fibrillation has replaced sinus rhythm from March 30, 2022.  Otherwise nonspecific EKG changes.    Indication: s/p Cardioversion  Performed at: 09:10a  Impression: Normal sinus rhythm at 62 bpm.  Flipped T waves noted in lead aVR and V1.  AK interval 148 ms, QRS 86 ms,  ms.  Sinus rhythm has now replaced rapid A. fib from the EKG earlier this morning.      I have independently reviewed and interpreted the EKG(s) documented above.        PROCEDURES:  [facility]    -Cardioversion External    Date/Time: 4/8/2022 8:57 AM  Performed by: Grace Osborne MD  Authorized by: Grace Osborne MD     Risks, benefits and alternatives discussed.    ED EVALUATION:      Assessment Time: 4/8/2022 8:59 AM      I have performed an Emergency Department Evaluation including taking a history and physical examination, this evaluation will be documented in the  electronic medical record for this ED encounter.     Indication: rapid atrial fib    ASA Class: Class 1- healthy patient    Mallampati: Grade 1- soft palate, uvula, tonsillar pillars, and posterior pharyngeal wall visible    NPO Status: appropriately NPO for procedure    UNIVERSAL PROTOCOL   Site Marked: NA  Prior Images Obtained and Reviewed:  NA  Required items: Required blood products, implants, devices and special equipment available    Patient identity confirmed:  Verbally with patient, arm band and hospital-assigned identification number  Patient was reevaluated immediately before administering moderate or deep sedation or anesthesia  Confirmation Checklist:  Patient's identity using two indicators, relevant allergies, procedure was appropriate and matched the consent or emergent situation and correct equipment/implants were available  Time out: Immediately prior to the procedure a time out was called    Universal Protocol: the Joint Commission Universal Protocol was followed    Preparation: Patient was prepped and draped in usual sterile fashion    ESBL (mL):  0    SEDATION  Patient Sedated: Yes    Sedation:  Propofol (70mg IV x 1)  Vital signs: Vital signs monitored during sedation      PRE-PROCEDURE DETAILS:     Cardioversion basis:  Emergent    Rhythm:  Atrial fibrillation    Electrode placement:  Anterior-lateral  Attempt one:     Cardioversion mode:  Synchronous    Waveform:  Biphasic    Shock (Joules):  200    Shock outcome:  Conversion to normal sinus rhythm  Post-procedure details:     Patient status:  Awake      PROCEDURE    Patient Tolerance:  Patient tolerated the procedure well with no immediate complications  Length of time physician/provider present for 1:1 monitoring during sedation: 10        I, Ayad Hillman, am serving as a scribe to document services personally performed by Dr. Grace Osborne based on my observation and the provider's statements to me. I, Dr. Grace Osborne MD attest  that Ayad Hillman is acting in a scribe capacity, has observed my performance of the services and has documented them in accordance with my direction.        Grace Osborne M.D. [facility]  Emergency Medicine and Medical Toxicology  Formerly [facility] EMERGENCY DEPARTMENT  [address] [place] [id number] [phone]  Dept: [phone]           Grace Osborne MD  04/08/22 0936

## 2022-04-08 NOTE — DISCHARGE INSTRUCTIONS
Keep your appointment to see the cardiologist for next week.    I spoke with the on-call cardiologist today and they would like you to start Eliquis 5 mg 2 times daily and also start metoprolol extended release 25 mg once a day.  These prescriptions have been provided for you.    Continue to avoid any caffeine products or other products that can potentially cause increased heart rate.    Return the emergency department if you go back into fast atrial fibrillation for more than 4 hours, chest pain, difficulty breathing, passout or feel like you could pass out, or any other concerns.    Thank you for choosing [facility] Emergency Department.  It has been my pleasure caring for you today.     ~Dr. India MD

## 2022-04-08 NOTE — ED NOTES
"Agree with the triage note    He has been cardioverted five times in ERs over the years. Last time was a week ago. He has appointment to see cardiology in four days. He had been hopping not to see cardiology and it would be \"another four years before I needed this again.\" That is why he did not make an appointment until a few days ago. He denies any pain or SOB as he lays in the bed. He feel if he stood up he might get SOB.  "

## 2022-04-09 LAB
ATRIAL RATE - MUSE: 141 BPM
DIASTOLIC BLOOD PRESSURE - MUSE: NORMAL MMHG
INTERPRETATION ECG - MUSE: NORMAL
P AXIS - MUSE: NORMAL DEGREES
PR INTERVAL - MUSE: NORMAL MS
QRS DURATION - MUSE: 86 MS
QT - MUSE: 324 MS
QTC - MUSE: 510 MS
R AXIS - MUSE: 19 DEGREES
SYSTOLIC BLOOD PRESSURE - MUSE: NORMAL MMHG
T AXIS - MUSE: 46 DEGREES
VENTRICULAR RATE- MUSE: 149 BPM

## 2022-04-13 ENCOUNTER — OFFICE VISIT (OUTPATIENT)
Dept: CARDIOLOGY | Facility: CLINIC | Age: 62
End: 2022-04-13
Payer: COMMERCIAL

## 2022-04-13 VITALS
SYSTOLIC BLOOD PRESSURE: 120 MMHG | OXYGEN SATURATION: 98 % | HEART RATE: 60 BPM | RESPIRATION RATE: 16 BRPM | WEIGHT: 229 LBS | DIASTOLIC BLOOD PRESSURE: 70 MMHG | BODY MASS INDEX: 28.62 KG/M2

## 2022-04-13 DIAGNOSIS — I48.0 PAROXYSMAL ATRIAL FIBRILLATION (H): Primary | ICD-10-CM

## 2022-04-13 DIAGNOSIS — I48.91 RAPID ATRIAL FIBRILLATION (H): ICD-10-CM

## 2022-04-13 PROCEDURE — 99214 OFFICE O/P EST MOD 30 MIN: CPT | Performed by: INTERNAL MEDICINE

## 2022-04-13 RX ORDER — FLECAINIDE ACETATE 150 MG/1
150 TABLET ORAL 2 TIMES DAILY
Qty: 60 TABLET | Refills: 4 | Status: SHIPPED | OUTPATIENT
Start: 2022-04-13 | End: 2022-09-14

## 2022-04-13 NOTE — LETTER
4/13/2022    Hector Zepeda MD  [facility] [address]  [facility] [address]    RE: Rogers Martinez       Dear Colleague,     I had the pleasure of seeing Rogers Martinez in the [facility].        Thank you, Dr. Foreman, for asking [facility] to evaluate Mr. Rogers Martinez.      Assessment/Recommendations   Assessment:    Paroxysmal atrial fibrillation, recurrent, symptomatic, history of PVI in 2017, familiar component    Plan:  Continue metoprolol  Start flecainide 150 mg twice a day  Stress echo to assure the safety of flecainide  We discussed second ablation as a way of preventing recurrence of atrial fibrillation.  He is interested in pursuing that option.    His Shimon vas score is 0.  He will complete current supply of Eliquis and then discontinued.  Explained to him he will need to go back on it prior to ablation.       History of Present Illness    Mr. Rogers Martinez is a 61 year old male who comes into rapid access clinic for evaluation of heart palpitations.  Since 2016 he has had 5 episode of atrial fibrillation.  In 2017 he had PVI at [facility] in [place].  He has not had recurrence of atrial fibrillation for about 2 years.  In 2019 he went back in [facility] and he was cardioverted.  He saw electrophysiologist who discussed with him second ablation.  He was not interested at that point.  In last month he had 2 recurrent episode atrial fibrillation.  Both times he went to the emergency room and get cardioverted.  He is clearly symptomatic when he develops fibrillation.  He denies loss of consciousness.  He cannot identify any triggers for atrial fibrillation.  He denies excessive drinking or any other triggers.  Outside of the time that he is in atrial fibrillation he feels great.  He exercises regularly.  He has no chest pain or shortness of breath.  His weight has been stable.  In 2016 he had complete cardiac work-up including  negative stress test and echo.  He had also negative sleep study.    ECG: Personally reviewed.  4/8/2022 #1 atrial fibrillation with rapid ventricular response #2 normal sinus rhythm within normal limits         Physical Examination Review of Systems   /70 (BP Location: Right arm, Patient Position: Sitting, Cuff Size: Adult Regular)   Pulse 60   Resp 16   Wt 103.9 kg (229 lb)   SpO2 98%   BMI 28.62 kg/m    Body mass index is 28.62 kg/m .  Wt Readings from Last 3 Encounters:   04/13/22 103.9 kg (229 lb)   04/08/22 102.1 kg (225 lb)   03/30/22 99.8 kg (220 lb)     General Appearance:   Alert, cooperative, no distress, appears stated age   Head/ENT: Normocephalic, without obvious abnormality. Membranes moist      EYES:  no scleral icterus, normal conjunctivae   Neck: Supple, symmetrical, trachea midline, no adenopathy, thyroid: not enlarged, symmetric, no carotid bruit or JVD   Chest/Lungs:   Lungs are clear to auscultation, respirations unlabored. No tenderness or deformity    Cardiovascular:   Regular rhythm, S1, S2 normal, no murmur, rub or gallop.   Abdomen:  Soft, non-tender, bowel sounds active all four quadrants,  no masses, no organomegaly   Extremities: no cyanosis or clubbing. No edema   Skin: Skin color, texture, turgor normal, no rashes or lesions.    Psychiatric: Normal affect, calm   Neurologic: Alert and oriented x 3, moving all four extremities.     Enc Vitals  BP: 120/70  Pulse: 60  Resp: 16  SpO2: 98 %  Weight: 103.9 kg (229 lb)                                          Lab Results    Chemistry/lipid CBC Cardiac Enzymes/BNP/TSH/INR   Recent Labs   Lab Test 04/16/21  1212   CHOL 209*   HDL 47   *   TRIG 105     Recent Labs   Lab Test 04/16/21  1212   *     Recent Labs   Lab Test 04/08/22  0728      POTASSIUM 3.9   CHLORIDE 110*   CO2 20*      BUN 11   CR 1.00   GFRESTIMATED 85   JOSE 8.6     Recent Labs   Lab Test 04/08/22  0728 03/30/22  0748 04/16/21  1212   CR  1.00 1.01 0.86     No results for input(s): A1C in the last 40645 hours.       Recent Labs   Lab Test 04/08/22  0728   WBC 6.2   HGB 15.0   HCT 43.9   MCV 94        Recent Labs   Lab Test 04/08/22  0728 03/30/22  0748 08/16/19  1516   HGB 15.0 15.6 15.2    Recent Labs   Lab Test 04/08/22  0728 03/30/22  0748 08/16/19  1516   TROPONINI 0.02 0.03 <0.01     No results for input(s): BNP, NTBNPI, NTBNP in the last 54037 hours.  Recent Labs   Lab Test 04/08/22  0728   TSH 2.95     No results for input(s): INR in the last 42672 hours.     Medical History  Surgical History Family History Social History   Past Medical History:   Diagnosis Date     Paroxysmal atrial fibrillation (H)      Past Surgical History:   Procedure Laterality Date     APPENDECTOMY       BOWEL RESECTION      with appy, age 9     CARDIOVERSION      10/16/16, 4/16/17, 8/16/19     [place] EPHYS EVL TRNSPTL [place] ATRIAL FIB ISOLAT PULM VEIN Left 7/12/2017    Procedure: EP Ablation PVI  RF to 4 PV;  Surgeon: Celina Marquez MD;      No premature CAD, SCD,cardiomyopathy    His mother, brother and sister had atrial fibrillation as well   Social History     Socioeconomic History     Marital status:      Spouse name: Not on file     Number of children: Not on file     Years of education: Not on file     Highest education level: Not on file   Occupational History     Not on file   Tobacco Use     Smoking status: Former Smoker     Types: Cigarettes, Cigars     Smokeless tobacco: Never Used   Substance and Sexual Activity     Alcohol use: Yes     Alcohol/week: 12.0 standard drinks     Drug use: No     Sexual activity: Yes   Other Topics Concern     Not on file   Social History Narrative     Not on file     Social Determinants of Health     Financial Resource Strain: Not on file   Food Insecurity: Not on file   Transportation Needs: Not on file   Physical Activity: Not on file   Stress: Not on file   Social Connections: Not on file   Intimate  Partner Violence: Not on file   Housing Stability: Not on file         Medications  Allergies   Current Outpatient Medications   Medication Sig Dispense Refill     apixaban ANTICOAGULANT (ELIQUIS ANTICOAGULANT) 5 MG tablet Take 1 tablet (5 mg) by mouth 2 times daily 60 tablet 0     cholecalciferol, vitamin D3, 5,000 unit Tab [CHOLECALCIFEROL, VITAMIN D3, 5,000 UNIT TAB] Take 2,000 Units by mouth daily.              flecainide (TAMBOCOR) 150 MG tablet Take 1 tablet (150 mg) by mouth 2 times daily 60 tablet 4     metoprolol succinate ER (TOPROL-XL) 25 MG 24 hr tablet Take 1 tablet (25 mg) by mouth daily 30 tablet 0     flecainide (TAMBOCOR) 50 MG tablet [FLECAINIDE (TAMBOCOR) 50 MG TABLET] Take 1 tablet (50 mg total) by mouth daily as needed. At onset of AF sx. (Patient not taking: Reported on 4/13/2022) 10 tablet 0        Allergies   Allergen Reactions     Terbinafine Hives               Thank you for allowing me to participate in the care of your patient.    Sincerely,     Tarik Hdz MD     [facility]

## 2022-04-13 NOTE — PROGRESS NOTES
Thank you, Dr. Foreman, for asking [facility] to evaluate Mr. Rogers Martinez.      Assessment/Recommendations   Assessment:    Paroxysmal atrial fibrillation, recurrent, symptomatic, history of PVI in 2017, familiar component    Plan:  Continue metoprolol  Start flecainide 150 mg twice a day  Stress echo to assure the safety of flecainide  We discussed second ablation as a way of preventing recurrence of atrial fibrillation.  He is interested in pursuing that option.    His Shimon vas score is 0.  He will complete current supply of Eliquis and then discontinued.  Explained to him he will need to go back on it prior to ablation.       History of Present Illness    Mr. Rogers Martinez is a 61 year old male who comes into rapid access clinic for evaluation of heart palpitations.  Since 2016 he has had 5 episode of atrial fibrillation.  In 2017 he had PVI at [facility] in [place].  He has not had recurrence of atrial fibrillation for about 2 years.  In 2019 he went back in [facility] and he was cardioverted.  He saw electrophysiologist who discussed with him second ablation.  He was not interested at that point.  In last month he had 2 recurrent episode atrial fibrillation.  Both times he went to the emergency room and get cardioverted.  He is clearly symptomatic when he develops fibrillation.  He denies loss of consciousness.  He cannot identify any triggers for atrial fibrillation.  He denies excessive drinking or any other triggers.  Outside of the time that he is in atrial fibrillation he feels great.  He exercises regularly.  He has no chest pain or shortness of breath.  His weight has been stable.  In 2016 he had complete cardiac work-up including negative stress test and echo.  He had also negative sleep study.    ECG: Personally reviewed.  4/8/2022 #1 atrial fibrillation with rapid ventricular response #2 normal sinus rhythm within normal limits         Physical Examination Review  of Systems   /70 (BP Location: Right arm, Patient Position: Sitting, Cuff Size: Adult Regular)   Pulse 60   Resp 16   Wt 103.9 kg (229 lb)   SpO2 98%   BMI 28.62 kg/m    Body mass index is 28.62 kg/m .  Wt Readings from Last 3 Encounters:   04/13/22 103.9 kg (229 lb)   04/08/22 102.1 kg (225 lb)   03/30/22 99.8 kg (220 lb)     General Appearance:   Alert, cooperative, no distress, appears stated age   Head/ENT: Normocephalic, without obvious abnormality. Membranes moist      EYES:  no scleral icterus, normal conjunctivae   Neck: Supple, symmetrical, trachea midline, no adenopathy, thyroid: not enlarged, symmetric, no carotid bruit or JVD   Chest/Lungs:   Lungs are clear to auscultation, respirations unlabored. No tenderness or deformity    Cardiovascular:   Regular rhythm, S1, S2 normal, no murmur, rub or gallop.   Abdomen:  Soft, non-tender, bowel sounds active all four quadrants,  no masses, no organomegaly   Extremities: no cyanosis or clubbing. No edema   Skin: Skin color, texture, turgor normal, no rashes or lesions.    Psychiatric: Normal affect, calm   Neurologic: Alert and oriented x 3, moving all four extremities.     Enc Vitals  BP: 120/70  Pulse: 60  Resp: 16  SpO2: 98 %  Weight: 103.9 kg (229 lb)                                          Lab Results    Chemistry/lipid CBC Cardiac Enzymes/BNP/TSH/INR   Recent Labs   Lab Test 04/16/21  1212   CHOL 209*   HDL 47   *   TRIG 105     Recent Labs   Lab Test 04/16/21  1212   *     Recent Labs   Lab Test 04/08/22  0728      POTASSIUM 3.9   CHLORIDE 110*   CO2 20*      BUN 11   CR 1.00   GFRESTIMATED 85   JOSE 8.6     Recent Labs   Lab Test 04/08/22  0728 03/30/22  0748 04/16/21  1212   CR 1.00 1.01 0.86     No results for input(s): A1C in the last 42098 hours.       Recent Labs   Lab Test 04/08/22  0728   WBC 6.2   HGB 15.0   HCT 43.9   MCV 94        Recent Labs   Lab Test 04/08/22  0728 03/30/22  0748 08/16/19  1516    HGB 15.0 15.6 15.2    Recent Labs   Lab Test 04/08/22  0728 03/30/22  0748 08/16/19  1516   TROPONINI 0.02 0.03 <0.01     No results for input(s): BNP, NTBNPI, NTBNP in the last 16960 hours.  Recent Labs   Lab Test 04/08/22  0728   TSH 2.95     No results for input(s): INR in the last 81785 hours.     Medical History  Surgical History Family History Social History   Past Medical History:   Diagnosis Date     Paroxysmal atrial fibrillation (H)      Past Surgical History:   Procedure Laterality Date     APPENDECTOMY       BOWEL RESECTION      with appy, age 9     CARDIOVERSION      10/16/16, 4/16/17, 8/16/19     [place] EPHYS EVL TRNSPTL [place] ATRIAL FIB ISOLAT PULM VEIN Left 7/12/2017    Procedure: EP Ablation PVI  RF to 4 PV;  Surgeon: Celina Marquez MD;      No premature CAD, SCD,cardiomyopathy    His mother, brother and sister had atrial fibrillation as well   Social History     Socioeconomic History     Marital status:      Spouse name: Not on file     Number of children: Not on file     Years of education: Not on file     Highest education level: Not on file   Occupational History     Not on file   Tobacco Use     Smoking status: Former Smoker     Types: Cigarettes, Cigars     Smokeless tobacco: Never Used   Substance and Sexual Activity     Alcohol use: Yes     Alcohol/week: 12.0 standard drinks     Drug use: No     Sexual activity: Yes   Other Topics Concern     Not on file   Social History Narrative     Not on file     Social Determinants of Health     Financial Resource Strain: Not on file   Food Insecurity: Not on file   Transportation Needs: Not on file   Physical Activity: Not on file   Stress: Not on file   Social Connections: Not on file   Intimate Partner Violence: Not on file   Housing Stability: Not on file         Medications  Allergies   Current Outpatient Medications   Medication Sig Dispense Refill     apixaban ANTICOAGULANT (ELIQUIS ANTICOAGULANT) 5 MG tablet Take 1 tablet (5 mg) by  mouth 2 times daily 60 tablet 0     cholecalciferol, vitamin D3, 5,000 unit Tab [CHOLECALCIFEROL, VITAMIN D3, 5,000 UNIT TAB] Take 2,000 Units by mouth daily.              flecainide (TAMBOCOR) 150 MG tablet Take 1 tablet (150 mg) by mouth 2 times daily 60 tablet 4     metoprolol succinate ER (TOPROL-XL) 25 MG 24 hr tablet Take 1 tablet (25 mg) by mouth daily 30 tablet 0     flecainide (TAMBOCOR) 50 MG tablet [FLECAINIDE (TAMBOCOR) 50 MG TABLET] Take 1 tablet (50 mg total) by mouth daily as needed. At onset of AF sx. (Patient not taking: Reported on 4/13/2022) 10 tablet 0        Allergies   Allergen Reactions     Terbinafine Hives

## 2022-04-13 NOTE — PATIENT INSTRUCTIONS
Rogers Martinez,    It was a pleasure to see you today at the [facility].     My recommendations after this visit include:    Start flecanide  Stress echo    ELLIE Hdz MD, FACC, ASHIA

## 2022-04-14 ENCOUNTER — TELEPHONE (OUTPATIENT)
Dept: CARDIOLOGY | Facility: CLINIC | Age: 62
End: 2022-04-14
Payer: COMMERCIAL

## 2022-04-14 DIAGNOSIS — I48.0 PAROXYSMAL ATRIAL FIBRILLATION (H): Primary | ICD-10-CM

## 2022-04-14 NOTE — TELEPHONE ENCOUNTER
Noted.  Follow up order placed.  4/14/2022 2:38 PM  VIVIAN Palma, MD Wilder Phan Ted, MD; P [facility] Ep Support [place] - kalie Montanez,     Thanks for the note - yes, of course!  EP team in [place] - let's set up a NPV to discuss redo PVI.     Thanks,   Ne             Previous Messages       ----- Message -----   From: Tarik Hdz MD   Sent: 4/14/2022   8:17 AM CDT   To: Braydon Giraldo MD     This is a patient that I saw yesterday in rapid access clinic.  He has a history of PVI in 2017.  He has been having recurrent atrial fibrillation.  He went to emergency room twice and got cardioverted within the last month or so.  I put him on daily flecainide.  I told him that second ablation would be a good option.  He is agreeable.  Can you please take it from here?     Tarik

## 2022-04-27 ENCOUNTER — HOSPITAL ENCOUNTER (OUTPATIENT)
Dept: CARDIOLOGY | Facility: HOSPITAL | Age: 62
Discharge: HOME | End: 2022-04-27
Attending: INTERNAL MEDICINE | Admitting: INTERNAL MEDICINE
Payer: COMMERCIAL

## 2022-04-27 DIAGNOSIS — I48.0 PAROXYSMAL ATRIAL FIBRILLATION (H): ICD-10-CM

## 2022-04-27 PROCEDURE — 93018 CV STRESS TEST I&R ONLY: CPT | Performed by: INTERNAL MEDICINE

## 2022-04-27 PROCEDURE — 93352 ADMIN ECG CONTRAST AGENT: CPT | Performed by: INTERNAL MEDICINE

## 2022-04-27 PROCEDURE — 93325 DOPPLER ECHO COLOR FLOW MAPG: CPT | Mod: TC

## 2022-04-27 PROCEDURE — 93321 DOPPLER ECHO F-UP/LMTD STD: CPT | Mod: 26 | Performed by: INTERNAL MEDICINE

## 2022-04-27 PROCEDURE — 93350 STRESS TTE ONLY: CPT | Mod: 26 | Performed by: INTERNAL MEDICINE

## 2022-04-27 PROCEDURE — 93325 DOPPLER ECHO COLOR FLOW MAPG: CPT | Mod: 26 | Performed by: INTERNAL MEDICINE

## 2022-04-27 PROCEDURE — 93016 CV STRESS TEST SUPVJ ONLY: CPT | Performed by: INTERNAL MEDICINE

## 2022-04-27 PROCEDURE — 255N000002 HC RX 255 OP 636: Performed by: INTERNAL MEDICINE

## 2022-04-27 RX ADMIN — PERFLUTREN 5 ML: 6.52 INJECTION, SUSPENSION INTRAVENOUS at 14:15

## 2022-05-11 DIAGNOSIS — I48.0 PAROXYSMAL ATRIAL FIBRILLATION (H): Primary | ICD-10-CM

## 2022-05-11 RX ORDER — METOPROLOL SUCCINATE 25 MG/1
25 TABLET, EXTENDED RELEASE ORAL DAILY
Qty: 90 TABLET | Refills: 1 | Status: SHIPPED | OUTPATIENT
Start: 2022-05-11 | End: 2022-07-19

## 2022-07-11 ENCOUNTER — TRANSFERRED RECORDS (OUTPATIENT)
Dept: HEALTH INFORMATION MANAGEMENT | Facility: CLINIC | Age: 62
End: 2022-07-11

## 2022-07-11 ENCOUNTER — LAB REQUISITION (OUTPATIENT)
Dept: LAB | Facility: CLINIC | Age: 62
End: 2022-07-11

## 2022-07-11 DIAGNOSIS — Z13.1 ENCOUNTER FOR SCREENING FOR DIABETES MELLITUS: ICD-10-CM

## 2022-07-11 DIAGNOSIS — Z13.220 ENCOUNTER FOR SCREENING FOR LIPOID DISORDERS: ICD-10-CM

## 2022-07-11 DIAGNOSIS — Z12.5 ENCOUNTER FOR SCREENING FOR MALIGNANT NEOPLASM OF PROSTATE: ICD-10-CM

## 2022-07-11 LAB
ANION GAP SERPL CALCULATED.3IONS-SCNC: 11 MMOL/L (ref 7–15)
BUN SERPL-MCNC: 9.1 MG/DL (ref 8–23)
CALCIUM SERPL-MCNC: 8.8 MG/DL (ref 8.8–10.2)
CHLORIDE SERPL-SCNC: 104 MMOL/L (ref 98–107)
CHOLEST SERPL-MCNC: 193 MG/DL
CREAT SERPL-MCNC: 0.89 MG/DL (ref 0.67–1.17)
DEPRECATED HCO3 PLAS-SCNC: 23 MMOL/L (ref 22–29)
GFR SERPL CREATININE-BSD FRML MDRD: >90 ML/MIN/1.73M2
GLUCOSE SERPL-MCNC: 96 MG/DL (ref 70–99)
HDLC SERPL-MCNC: 46 MG/DL
LDLC SERPL CALC-MCNC: 122 MG/DL
NONHDLC SERPL-MCNC: 147 MG/DL
POTASSIUM SERPL-SCNC: 4.7 MMOL/L (ref 3.4–5.3)
PSA SERPL-MCNC: 2.27 NG/ML (ref 0–4.5)
SODIUM SERPL-SCNC: 138 MMOL/L (ref 136–145)
TRIGL SERPL-MCNC: 125 MG/DL

## 2022-07-11 PROCEDURE — G0103 PSA SCREENING: HCPCS | Performed by: FAMILY MEDICINE

## 2022-07-11 PROCEDURE — 80048 BASIC METABOLIC PNL TOTAL CA: CPT | Performed by: FAMILY MEDICINE

## 2022-07-11 PROCEDURE — 80061 LIPID PANEL: CPT | Performed by: FAMILY MEDICINE

## 2022-07-19 ENCOUNTER — OFFICE VISIT (OUTPATIENT)
Dept: CARDIOLOGY | Facility: CLINIC | Age: 62
End: 2022-07-19
Attending: INTERNAL MEDICINE
Payer: COMMERCIAL

## 2022-07-19 VITALS
RESPIRATION RATE: 16 BRPM | DIASTOLIC BLOOD PRESSURE: 62 MMHG | SYSTOLIC BLOOD PRESSURE: 112 MMHG | BODY MASS INDEX: 28.15 KG/M2 | WEIGHT: 226.4 LBS | HEART RATE: 72 BPM | HEIGHT: 75 IN

## 2022-07-19 DIAGNOSIS — I48.0 PAROXYSMAL ATRIAL FIBRILLATION (H): Primary | ICD-10-CM

## 2022-07-19 PROCEDURE — 99214 OFFICE O/P EST MOD 30 MIN: CPT | Performed by: INTERNAL MEDICINE

## 2022-07-19 RX ORDER — DILTIAZEM HYDROCHLORIDE 120 MG/1
120 CAPSULE, EXTENDED RELEASE ORAL DAILY
Qty: 30 CAPSULE | Refills: 11 | Status: SHIPPED | OUTPATIENT
Start: 2022-07-19 | End: 2022-09-21

## 2022-07-19 NOTE — PROGRESS NOTES
[facility]  Cardiac Electrophysiology  [address]  [place] [address]   Office: [phone]  Fax: [phone]     Cardiac Electrophysiology Consultation    Patient: Rogers Martinez   : 1960     Referring Provider: Tarik Hdz MD  Primary Care Provider: Hector Zepeda MD    CHIEF COMPLAINT/REASON FOR CONSULTATION  Recurrent paroxysmal atrial fibrillation after prior PVI (2017)     Assessment/Recommendations   Rogers aMrtinez is a 62 year old male with recurrent paroxysmal atrial fibrillation after prior PVI (2017) referred by Dr. Hdz for consultation regarding atrial fibrillation.    Recurrent paroxysmal atrial fibrillation - prior RF PVI 2017, symptomatic with palpitations and exertional dyspnea  [id number] 0  We reviewed atrial fibrillation physiology and management considerations including managing stroke risk, rate control, cardioversion, antiarrhythmic drug therapy, and catheter ablation.  We discussed atrial fibrillation ablation procedures, anticipated success rates, the potential need for re-do ablation vs addition of anti-arrhythmic drugs, procedural risks (including groin bleeding, tamponade, phrenic or esophageal injury, stroke, pulmonary vein stenosis) and recovery expectations.  He would prefer repeat ablation - if a 2nd ablation is unsuccessful, he may then prefer long term antiarrhythmic drug therapy  - redo PVI with assessment of non-PV triggers, general anesthesia, stop flecainide 7 days prior, stop diltiazem 2 days prior, start apixaban 5mg twice twice daily 5 days prior  - continue flecainide 150mg twice daily for now  - stop metoprolol XL 25mg daily given dyspnea as a side effect  - start diltiazem CD 120mg daily - he will monitor for worsened dyspnea or other side effects  - given his [id number] 0, the long term risk-benefit balance of therapeutic anticoagulation is not indicated.  - we discussed the ongoing importance  "of lifestyle modification (maintaining a healthy weight, sleep apnea diagnosis and management, alcohol avoidance) as part of a long term strategy for atrial fibrillation management      Follow up: as above         History of Present Illness   Rogers Martinez is a 62 year old male with recurrent paroxysmal atrial fibrillation after prior PVI (7/12/2017) referred by Dr. Hdz for consultation regarding atrial fibrillation.    Mr. Martinez notes episodes of palpitations and dyspnea since 2016.  He underwent RF PVI 7/12/2017 (Dr. Celina Marquez).  He underwent a PV CT 9/28/2017 showing no evidence of PV stenosis.  He had recurrent atrial fibrillation in 2019 and underwent DCCV.  He had recurrent atrial fibrillation and underwent ER DCCV 4/8/2022 and 3/30/2022.  He was started on flecainide 150mg twice daily in addition to metoprolol XL 25mg daily 4/2022 (he notes some dyspnea with metoprolol XL).    He is active with hiking.  He denies chest pain, syncope.       Physical Examination  Review of Systems   VITALS: /62 (BP Location: Left arm, Patient Position: Sitting, Cuff Size: Adult Large)   Pulse 72   Resp 16   Ht 1.905 m (6' 3\")   Wt 102.7 kg (226 lb 6.4 oz)   BMI 28.30 kg/m    Wt Readings from Last 3 Encounters:   04/13/22 103.9 kg (229 lb)   04/08/22 102.1 kg (225 lb)   03/30/22 99.8 kg (220 lb)     CONSTITUTIONAL: well nourished, comfortable, no distress  EYES:  Conjunctivae pink, sclerae clear.    E/N/T:  Oral mucosa pink  RESPIRATORY:  Respiratory effort is normal  CARDIOVASCULAR:  normal S1 and S2  GASTROINTESTINAL:  Abdomen without masses or tenderness  EXTREMITIES:  No clubbing or cyanosis.    MUSCULOSKELETAL:  Overall grossly normal muscle strength  SKIN:  Overall, skin warm and dry, no lesions.  NEURO/PSYCH:  Oriented x 3 with normal affect.   Constitutional:  No weight loss or loss of appetite    Eyes:  No difficulty with vision, no double vision, no dry eyes  ENT:  No sore throat, difficulty " swallowing; changes in hearing or tinnitus  Cardiovascular: As detailed above  Respiratory:  No cough  Musculoskeletal  No joint pain, muscle aches  Neurologic:  No syncope, lightheadedness, fainting spells   Hematologic: No easy bruising, excessive bleeding tendency   Gastrointestinal:  No jaundice, abdominal pain or abdominal bloating  Genitourinary: No changes in urinary habits, no trouble urinating    Psychiatric: No anxiety or depression      Medical History  Surgical History   Past Medical History:   Diagnosis Date     Paroxysmal atrial fibrillation (H)     Past Surgical History:   Procedure Laterality Date     APPENDECTOMY       BOWEL RESECTION      with appy, age 9     CARDIOVERSION      10/16/16, 4/16/17, 8/16/19     [place] EPHYS EVL TRNSPTL [place] ATRIAL FIB ISOLAT PULM VEIN Left 7/12/2017    Procedure: EP Ablation PVI  RF to 4 PV;  Surgeon: Celina Marquez MD;          Family History Social History   Family History   Problem Relation Age of Onset     Atrial fibrillation Mother      Aortic aneurysm Father      Atrial fibrillation Sister      Atrial fibrillation Brother         Social History     Tobacco Use     Smoking status: Former Smoker     Types: Cigarettes, Cigars     Smokeless tobacco: Never Used   Substance Use Topics     Alcohol use: Yes     Alcohol/week: 12.0 standard drinks     Drug use: No         Medications  Allergies     Current Outpatient Medications:      cholecalciferol, vitamin D3, 5,000 unit Tab, [CHOLECALCIFEROL, VITAMIN D3, 5,000 UNIT TAB] Take 2,000 Units by mouth daily.       , Disp: , Rfl:      flecainide (TAMBOCOR) 150 MG tablet, Take 1 tablet (150 mg) by mouth 2 times daily, Disp: 60 tablet, Rfl: 4     flecainide (TAMBOCOR) 50 MG tablet, [FLECAINIDE (TAMBOCOR) 50 MG TABLET] Take 1 tablet (50 mg total) by mouth daily as needed. At onset of AF sx. (Patient not taking: Reported on 4/13/2022), Disp: 10 tablet, Rfl: 0     metoprolol succinate ER (TOPROL XL) 25 MG 24 hr tablet, Take  1 tablet (25 mg) by mouth daily, Disp: 90 tablet, Rfl: 1     Allergies   Allergen Reactions     Terbinafine Hives          Lab Results    Chemistry CBC Cardiac Enzymes/BNP/TSH/INR   Recent Labs   Lab Test 07/11/22  1102      POTASSIUM 4.7   CHLORIDE 104   CO2 23   GLC 96   BUN 9.1   CR 0.89   GFRESTIMATED >90   JOSE 8.8     Recent Labs   Lab Test 07/11/22  1102 04/08/22  0728 03/30/22  0748   CR 0.89 1.00 1.01          Recent Labs   Lab Test 04/08/22  0728   WBC 6.2   HGB 15.0   HCT 43.9   MCV 94        Recent Labs   Lab Test 04/08/22  0728 03/30/22  0748 08/16/19  1516   HGB 15.0 15.6 15.2    Recent Labs   Lab Test 04/08/22  0728 03/30/22  0748 08/16/19  1516   TROPONINI 0.02 0.03 <0.01     No results for input(s): BNP, NTBNPI, NTBNP in the last 58568 hours.  Recent Labs   Lab Test 04/08/22  0728   TSH 2.95     No results for input(s): INR in the last 18904 hours.      Data Review    ECGs (tracings independently reviewed)  4/8/2022 (post DCCV) - SR 62bpm  4/8/2022 - AF, ventricular rate 149bpm  3/30/2022 - AF, ventricular rate 130bpm    24hr Holter monitoring 10/9/2019 (independently reviewed)  1.  Modest sinus bradycardia.  No profound bradycardia or long pauses and no waking  hour bradycardia noted.  2.  Several short bursts of atrial tachycardia.  One episode of brief atrial tachycardia/atrial fibrillation and lasts about 20 seconds.    4/27/2022 exercise TTE  Patient exercised 9:55 seconds on a Mannie protocol stopping secondary to  fatigue  Appropriate heart rate and blood pressure response to exercise.Patient  achieved 84% of maximum predicted heart rate  Exercise ECG is negative for ischemia at the workload achieved  Rest Echo:Definity utilized.Normal LV function.LVEF estimate at 60-65%  Post Exercise Echo:Appropriate augmentation of systolic function without  observed wall motion abnormality  Conclusions: Exercise stress echocardiogram is negative for ischemia at the  workload achieved.Maximum  heart rate achieved was mildly reduced at 84%.Post  exercise images are mildly challenging.       Cc: Tarik Hdz MD, Hector Zepeda MD Amila Dilusha William, MD  7/19/2022  3:52 PM

## 2022-07-19 NOTE — PATIENT INSTRUCTIONS
[facility]  Cardiac Electrophysiology  [address]  [place] [address]   Office: [phone]  Fax: [phone]       Thank you for seeing us in clinic today - it is a pleasure to be a part of your care team.  Below is a summary of our plan from today's visit.       You have recurrent paroxysmal atrial fibrillation following prior ablation 7/12/2017, presently being managed with flecainide, metoprolol XL.  We reviewed physiology and management options including antiarrhythmic drug therapy, catheter ablation and lifestyle modification.  We will plan for the following:  - our office will reach out to coordinate redo atrial fibrillation ablation  - stop metoprolol XL and start diltiazem CD 120mg daily - monitor for worsened shortness of breath or other side effects  - start Eliquis 5 days prior to ablation, to be continued for 3 months post procedure     Please do not hesitate to be in touch with our office at [phone] with any questions that may arise.       Thank you for trusting us with your care,    Braydon Giraldo MD  Clinical Cardiac Electrophysiology  [facility]  [address]  [place] [address]   Office: [phone]  Fax: [phone]            ATRIAL FIBRILLATION: Patient Information    What is atrial fibrillation?  Atrial fibrillation (AF, A-fib) is a common heart rhythm problem (arrhythmia) occurring within the upper chambers of the heart (the atria).  In normal rhythm, the upper and lower chambers of the heart are electrically driven to contract in a coordinated sequence.  In atrial fibrillation, the atria lose their ability to contract because of rapid and chaotic electrical activity.  The lower chambers of the heart (the ventricles) continue to pump blood throughout the body, though with irregular and often faster rate due to the chaotic activity within the atria.        How do I know if I have atrial  fibrillation?   Some people may feel their heart beating faster, harder, or irregularly while in atrial fibrillation.  Others may be lightheaded, fatigued, feel weak or tired or become more short of breath especially with activities.  Some patients have no symptoms at all.  Atrial fibrillation may be found due to an irregular pulse or on an electrocardiogram (ECG). Atrial fibrillation can start and stop on its own, and episodes can last from seconds to several months.      How common is atrial fibrillation?   An estimated 3-6 million people in the [place] have atrial fibrillation.  Atrial fibrillation is a common heart rhythm problem for older persons, affecting as estimated 12-15% of people over the age of 65 years of age.    What causes atrial fibrillation?   Age is the most important risk factor for atrial fibrillation.  Atrial fibrillation is more common in people with other heart disease, high blood pressure, diabetes, obesity, sleep apnea and in people who regularly consume alcohol.  Surgery, lung disease, or thyroid problems can lead to atrial fibrillation.  Atrial fibrillation has multiple possible causes, and in most cases a single cause cannot be found.  Atrial fibrillation is a progressive condition, usually starting with at an early stage with short and infrequent episodes.  In later stages of disease, more frequent and longer lasting episodes of atrial fibrillation occur, ultimately culminating in episodes which do not spontaneously terminate.  Generally, more enlargement and scarring within the upper chambers of the heart is observed as atrial fibrillation progresses from early to late-stage disease.    How is atrial fibrillation diagnosed and evaluated?    Because of its start-stop nature, atrial fibrillation can be challenging to diagnose.  Atrial fibrillation is most commonly diagnosed via cardiac rhythm recordings - either an ECG or wearable cardiac rhythm monitor.  For patients with  pacemakers, defibrillators or implantable loop recorders, atrial fibrillation may be recorded via these devices.  Recently, commercially available devices (eg. Apple Watch, Altocom device, certain Elliptic devices, others) can allow patients to take 30 second cardiac rhythm recordings which may document atrial fibrillation.  Once atrial fibrillation is diagnosed, additional tests include blood tests and an echocardiogram.  The echocardiogram uses ultrasound to look at your heart to assess your cardiac function and evaluate for other heart disease.  Additional evaluation may include CT or MRI studies.    Is atrial fibrillation dangerous?   Atrial fibrillation is not usually a life-threatening arrhythmia.  The most serious consequences of atrial fibrillation including stroke and worsening of overall cardiac function.  While in atrial fibrillation, the upper cardiac chambers do not contract normally, resulting in slower blood flow and increased risk of clot formation.  If this blood clot becomes detached from the heart a stroke can occur.  Unfortunately, stroke can be the first sign of atrial fibrillation for some people.  With a stroke, you may notice abnormal sensation, weakness on one side of the body or face, changes in your vision or speech.  If you have any of these signs, you should contact EMS and be evaluated in an emergency room as soon as possible.      How is atrial fibrillation treated?     Several treatment options exist for suppressing atrial fibrillation - however, it is not an easily curable arrhythmia.  The first goal in managing atrial fibrillation is to minimize stroke risk.  The second goal is to improve symptoms associated with atrial fibrillation.  Finally, in patients with reduced cardiac function, maintaining normal rhythm can help improve cardiac function.      Blood thinners are used to reduce the risk of stroke in patients with high estimated stroke risk related to atrial fibrillation.  For  patients at higher risk of bleeding related to blood thinner use, implantable devices may be an option to reduce stroke risk without the need for long term blood thinner use.      Atrial fibrillation can be managed via two strategies: rate control and rhythm control.  In a rate control strategy, continued atrial fibrillation is accepted and medications (eg. beta-blockers or calcium channel blockers) are used to control the lower chamber rate.  In a rhythm control strategy, anti-arrhythmic medications or catheter ablation are used to maintain normal cardiac rhythm and slow disease progression by suppressing atrial fibrillation.  A procedure called a cardioversion, in which an electric shock is delivered through patches placed on the chest wall while under deep sedation, can be performed to temporarily restore normal cardiac rhythm, though does not address the chance of atrial fibrillation recurrence.  Treatments are more effective for earlier rather than later stage atrial fibrillation.  Lifestyle modifications (maintaining a healthy weight, aerobic exercise, diagnosing and treating sleep apnea, and minimizing alcohol intake) are important elements of atrial fibrillation rhythm control.     What is catheter ablation for atrial fibrillation?  Cardiac catheter ablation is a commonly performed, minimally invasive procedure performed by a cardiac electrophysiologist to treat many different cardiac rhythm abnormalities.  During catheter ablation, long, thin, flexible tubes are advanced into the heart via small sheaths inserted into the femoral veins and thermal energy (either heating or cooling) is applied within the heart to disrupt abnormal electrical activity.  Atrial fibrillation ablation is performed under general anesthesia, with procedures generally taking approximately 2-3 hours.  Patients are typically observed for 3-5 hours after the ablation, and in most cases can be discharged home the same day.  Atrial  fibrillation ablation is associated with better outcomes (mortality, cardiovascular hospitalizations, atrial arrhythmia recurrences) compared to antiarrhythmic drug therapy.  However, atrial fibrillation recurrences are not uncommon, and repeat catheter ablation may be offered.  Your electrophysiology team can review atrial fibrillation ablation, anticipated success rates, risks, and recovery expectations with you.    What are anti-arrhythmic medications?  Anti-arrhythmic medications are specialized drugs which alter cardiac electrical functioning to suppress arrhythmias.  There are several anti-arrhythmic medications available, each with its own success rate and side effects.  Some anti-arrhythmic medications are less effective though safer to use, others are more effective though have serious potential toxicities.  Atrial fibrillation recurrences are common and may require dose adjustment or change in antiarrhythmic therapy.  Your electrophysiology team will carefully consider which medication would be the best and safest for your particular case.      Can I live a normal life?    The goal of atrial fibrillation management is for patients to live normal lives without being limited by symptoms related to atrial fibrillation.    Are any additional educational resources available?  There are a number of excellent atrial fibrillation education resources available to you online.  A few options you may wish to review include:  hrsonline.org/guide-atrial-fibrillation  afibmatters.org  getsmartaboutafib.com  stopaf.com    What comes next?    Consider your management options and let us know how we can help in your decision process.  Please take medications as they have been prescribed.  You should also get any tests that may have been ordered for you.      When to Call Your Doctor or seek emergency care:  Call your doctor or seek emergency care if you have any significant changes with the  following:  Weakness  Dizziness  Fainting  Fatigue  Shortness of breath  Chest pain with increased activity  If you are concerned that your heart rate is too fast or too slow  Bleeding that does not stop in 10 minutes  Coughing or throwing up blood  Bloody diarrhea or bleeding hemorrhoids  Dark-colored urine or black stool  Allergic reactions:  Rash  Itching  Swelling  Trouble breathing or swallowing      Please call the [facility] at [phone] if you have concerns about your symptoms, your medicines, or your follow-up appointments.

## 2022-07-19 NOTE — LETTER
2022    Ahsan Andrew MD  [facility] [address]  [facility] [address]    RE: Rogers Martinez       Dear Colleague,     I had the pleasure of seeing Rogers Martinez in the [facility].     [facility]  Cardiac Electrophysiology  [address]  [place] [address]   Office: [phone]  Fax: [phone]     Cardiac Electrophysiology Consultation    Patient: Rogers Martinez   : 1960     Referring Provider: Tarik Hdz MD  Primary Care Provider: Hector Zepeda MD    CHIEF COMPLAINT/REASON FOR CONSULTATION  Recurrent paroxysmal atrial fibrillation after prior PVI (2017)     Assessment/Recommendations   Rogers Martinez is a 62 year old male with recurrent paroxysmal atrial fibrillation after prior PVI (2017) referred by Dr. Hdz for consultation regarding atrial fibrillation.    Recurrent paroxysmal atrial fibrillation - prior RF PVI 2017, symptomatic with palpitations and exertional dyspnea  [id number]  We reviewed atrial fibrillation physiology and management considerations including managing stroke risk, rate control, cardioversion, antiarrhythmic drug therapy, and catheter ablation.  We discussed atrial fibrillation ablation procedures, anticipated success rates, the potential need for re-do ablation vs addition of anti-arrhythmic drugs, procedural risks (including groin bleeding, tamponade, phrenic or esophageal injury, stroke, pulmonary vein stenosis) and recovery expectations.  He would prefer repeat ablation - if a 2nd ablation is unsuccessful, he may then prefer long term antiarrhythmic drug therapy  - redo PVI with assessment of non-PV triggers, general anesthesia, stop flecainide 7 days prior, stop diltiazem 2 days prior, start apixaban 5mg twice twice daily 5 days prior  - continue flecainide 150mg twice daily for now  - stop metoprolol XL 25mg daily given dyspnea as a side effect  - start  "diltiazem CD 120mg daily - he will monitor for worsened dyspnea or other side effects  - given his [id number] 0, the long term risk-benefit balance of therapeutic anticoagulation is not indicated.  - we discussed the ongoing importance of lifestyle modification (maintaining a healthy weight, sleep apnea diagnosis and management, alcohol avoidance) as part of a long term strategy for atrial fibrillation management      Follow up: as above         History of Present Illness   Rogers Martinez is a 62 year old male with recurrent paroxysmal atrial fibrillation after prior PVI (7/12/2017) referred by Dr. Hdz for consultation regarding atrial fibrillation.    Mr. Martinez notes episodes of palpitations and dyspnea since 2016.  He underwent RF PVI 7/12/2017 (Dr. Celina Marquez).  He underwent a PV CT 9/28/2017 showing no evidence of PV stenosis.  He had recurrent atrial fibrillation in 2019 and underwent DCCV.  He had recurrent atrial fibrillation and underwent ER DCCV 4/8/2022 and 3/30/2022.  He was started on flecainide 150mg twice daily in addition to metoprolol XL 25mg daily 4/2022 (he notes some dyspnea with metoprolol XL).    He is active with hiking.  He denies chest pain, syncope.       Physical Examination  Review of Systems   VITALS: /62 (BP Location: Left arm, Patient Position: Sitting, Cuff Size: Adult Large)   Pulse 72   Resp 16   Ht 1.905 m (6' 3\")   Wt 102.7 kg (226 lb 6.4 oz)   BMI 28.30 kg/m    Wt Readings from Last 3 Encounters:   04/13/22 103.9 kg (229 lb)   04/08/22 102.1 kg (225 lb)   03/30/22 99.8 kg (220 lb)     CONSTITUTIONAL: well nourished, comfortable, no distress  EYES:  Conjunctivae pink, sclerae clear.    E/N/T:  Oral mucosa pink  RESPIRATORY:  Respiratory effort is normal  CARDIOVASCULAR:  normal S1 and S2  GASTROINTESTINAL:  Abdomen without masses or tenderness  EXTREMITIES:  No clubbing or cyanosis.    MUSCULOSKELETAL:  Overall grossly normal muscle strength  SKIN:  Overall, " skin warm and dry, no lesions.  NEURO/PSYCH:  Oriented x 3 with normal affect.   Constitutional:  No weight loss or loss of appetite    Eyes:  No difficulty with vision, no double vision, no dry eyes  ENT:  No sore throat, difficulty swallowing; changes in hearing or tinnitus  Cardiovascular: As detailed above  Respiratory:  No cough  Musculoskeletal  No joint pain, muscle aches  Neurologic:  No syncope, lightheadedness, fainting spells   Hematologic: No easy bruising, excessive bleeding tendency   Gastrointestinal:  No jaundice, abdominal pain or abdominal bloating  Genitourinary: No changes in urinary habits, no trouble urinating    Psychiatric: No anxiety or depression      Medical History  Surgical History   Past Medical History:   Diagnosis Date     Paroxysmal atrial fibrillation (H)     Past Surgical History:   Procedure Laterality Date     APPENDECTOMY       BOWEL RESECTION      with appy, age 9     CARDIOVERSION      10/16/16, 4/16/17, 8/16/19     [facility] EPHYS EVL TRNSPTL TX ATRIAL FIB ISOLAT PULM VEIN Left 7/12/2017    Procedure: EP Ablation PVI  RF to 4 PV;  Surgeon: Celina Marquez MD;          Family History Social History   Family History   Problem Relation Age of Onset     Atrial fibrillation Mother      Aortic aneurysm Father      Atrial fibrillation Sister      Atrial fibrillation Brother         Social History     Tobacco Use     Smoking status: Former Smoker     Types: Cigarettes, Cigars     Smokeless tobacco: Never Used   Substance Use Topics     Alcohol use: Yes     Alcohol/week: 12.0 standard drinks     Drug use: No         Medications  Allergies     Current Outpatient Medications:      cholecalciferol, vitamin D3, 5,000 unit Tab, [CHOLECALCIFEROL, VITAMIN D3, 5,000 UNIT TAB] Take 2,000 Units by mouth daily.       , Disp: , Rfl:      flecainide (TAMBOCOR) 150 MG tablet, Take 1 tablet (150 mg) by mouth 2 times daily, Disp: 60 tablet, Rfl: 4     flecainide (TAMBOCOR) 50 MG tablet,  [FLECAINIDE (TAMBOCOR) 50 MG TABLET] Take 1 tablet (50 mg total) by mouth daily as needed. At onset of AF sx. (Patient not taking: Reported on 4/13/2022), Disp: 10 tablet, Rfl: 0     metoprolol succinate ER (TOPROL XL) 25 MG 24 hr tablet, Take 1 tablet (25 mg) by mouth daily, Disp: 90 tablet, Rfl: 1     Allergies   Allergen Reactions     Terbinafine Hives          Lab Results    Chemistry CBC Cardiac Enzymes/BNP/TSH/INR   Recent Labs   Lab Test 07/11/22  1102      POTASSIUM 4.7   CHLORIDE 104   CO2 23   GLC 96   BUN 9.1   CR 0.89   GFRESTIMATED >90   JOSE 8.8     Recent Labs   Lab Test 07/11/22  1102 04/08/22  0728 03/30/22  0748   CR 0.89 1.00 1.01          Recent Labs   Lab Test 04/08/22  0728   WBC 6.2   HGB 15.0   HCT 43.9   MCV 94        Recent Labs   Lab Test 04/08/22  0728 03/30/22  0748 08/16/19  1516   HGB 15.0 15.6 15.2    Recent Labs   Lab Test 04/08/22  0728 03/30/22  0748 08/16/19  1516   TROPONINI 0.02 0.03 <0.01     No results for input(s): BNP, NTBNPI, NTBNP in the last 92562 hours.  Recent Labs   Lab Test 04/08/22  0728   TSH 2.95     No results for input(s): INR in the last 42264 hours.      Data Review    ECGs (tracings independently reviewed)  4/8/2022 (post DCCV) - SR 62bpm  4/8/2022 - AF, ventricular rate 149bpm  3/30/2022 - AF, ventricular rate 130bpm    24hr Holter monitoring 10/9/2019 (independently reviewed)  1.  Modest sinus bradycardia.  No profound bradycardia or long pauses and no waking  hour bradycardia noted.  2.  Several short bursts of atrial tachycardia.  One episode of brief atrial tachycardia/atrial fibrillation and lasts about 20 seconds.    4/27/2022 exercise TTE  Patient exercised 9:55 seconds on a Mannie protocol stopping secondary to  fatigue  Appropriate heart rate and blood pressure response to exercise.Patient  achieved 84% of maximum predicted heart rate  Exercise ECG is negative for ischemia at the workload achieved  Rest Echo:Definity utilized.Normal LV  function.LVEF estimate at 60-65%  Post Exercise Echo:Appropriate augmentation of systolic function without  observed wall motion abnormality  Conclusions: Exercise stress echocardiogram is negative for ischemia at the  workload achieved.Maximum heart rate achieved was mildly reduced at 84%.Post  exercise images are mildly challenging.       Cc: Tarik Hdz MD, Hector Zepeda MD Amila Dilusha William, MD  7/19/2022  3:52 PM      Thank you for allowing me to participate in the care of your patient.      Sincerely,     Braydon Giraldo MD     [facility]  cc:   Braydon Giraldo MD  [address] [place] [address]

## 2022-07-20 ENCOUNTER — DOCUMENTATION ONLY (OUTPATIENT)
Dept: CARDIOLOGY | Facility: CLINIC | Age: 62
End: 2022-07-20

## 2022-07-20 DIAGNOSIS — I48.0 PAROXYSMAL ATRIAL FIBRILLATION (H): Primary | ICD-10-CM

## 2022-07-20 NOTE — PROGRESS NOTES
H&P []  Date: Teach []  Date: PVI  Clinic N [x]  Y [] JACQUELINE N [x] Y[]  Order [] CT  MRI N [x] Y[]  Order []  CT ordered s/p PVI in 2017   PVI  Order Case Req []  Order Set [] Lab/EKG   []  Orders Letter [] F/U RN [] Date:  Order NP follow-up [] Date:     COVID FV/EPIC []  Date:    Home [] AC Eliquis [x]   Xarelot []  Pradaxa []  Warfarin [] Start [x] Start 5mg BID 5 days prior to PVI  Cont []  Switch [] to:   INR Reminder EP [] & INR Msg to AC team []     AAD Hold Flecainide 7 days prior, and Diltiazem 2 days prior        Hold x3 days []  Continue [] PPI Protonix 40mg QD [x] 3 days, x 6wks  Pepcid 20mg BID []  Omeprazole 40mg QD []  Continue current PPI []  Increase [] :       1960  Home:[phone] (home) Cell:[phone] (mobile)  Emergency Contact: Marci Martinez [phone]  PCP: Ahsan Andrew, [phone]    Important patient information for CSC/Cath Lab staff : None    [facility] EP Cath Lab Procedure Order   Ablation Type:  PVI- Atrial Fibrillation, and AT  Diagnosis:  AF  Ordering Provider: Dr Giraldo  Ordering Date: 7/20/2022    Scheduling Information:  Anticipated Case Duration:  Dr Giraldo 3:1 Day   Scheduling Timeframe:  Next Available  Clinic Arrangements prior to PVI: Schedule pt directly for PVI procedure with designated MD listed below, pt to see EP NP only for H&P and EP RN for education prior  Scheduling Restrictions: Will need to hold antiarrhythmic 7 days prior to procedure-schedule accordingly  Scheduling Contact: Please contact pt to schedule, if you are unable to schedule date within the next 24 hours please contact pt to update on scheduling process  EP RN Follow Up Apt: Schedule telephone visit for post op follow up 3-4 days after abaltion    MD Preference: Dr Giraldo    Current Device: None None  Device Company/Device Rep Needed for Procedure: None    Pre-Procedural Testing needed: Home COVID Test-Same day D/C and BMP, CBC with Plt, and Type and Screen AM of Case  Mapping System  Required:  Carto  ICE Needed:  Yes  Anesthesia:  General-Whole Case    [facility] EP Cath Lab Prep   H&P:  Schedule apt with EP NP to complete within 1 wk of scheduled PVI    Pre-op Labs: Ordered AM of procedure    Medical Records Pertinent for Procedure:  Holter/ACT 10/9/1910/9/19 AT, CT/MRI 2017 and Stress Test negative Ef60-65%  Important Past Medical Hx/Diagnosis: CHADS VASC 0   Most Recent Lab Results: BMP- Within Acceptable Limits for Procedure Heme- Within Acceptable Limits for Procedure    Patient Education:  Teach with Patient: Teach with pt will be completed same day as pre-op H&P-see additional clinic note    Risks Reviewed:   Pulmonary Vein/AF/Radiofrequency Ablation  In addition to standard risks for Radiofrequency Ablation, there is:    <2% for significant pulmonary vein stenosis    <2% risk for embolic events    <1% risk for esophageal fistula    <1% risk death      Pulmonary Vein Isolation / Cryoablation Risks:    1-2% risk for phrenic nerve paralysis    <1% risk for pulmonary vein stenosis    Risk of esophageal irritation with no incidence of atrial-esophageal fistula    Rare cryoballoon rupture    <1% risk death       Cardiac Catheter Ablation    <1% risk for the following: hypotension, hemorrhage, vascular injury including perforation of vein, artery or heart, thrombophlebitis, systemic or pulmonic emboli; cardiac perforation, (tamponade), infection, pneumothorax, arrhythmias, proarrhythmic effects of drugs, radiation exposure.    1-2% complete heart block (for AVNRT or septol accessory pathway).    <0.5% CVA or MI    <0.1% death    If external defibrillation is needed, 75% risk for superficial burn.    1-2% tamponade and aortic puncture with left sided transeptal approach for left side MARLEY - increase risk of CVA to <2%.    Late arrhythmia recurrences depends upon the primary rhythm disturbance.      Pre-Procedure Instructions:  NPO after midnight, Remove all jewelry prior to coming in for procedure,  Shower prior to arrival, Notified patient of time and date of procedure by CV , Transportation arrangements needed s/p procedure, Post-procedure follow up process, Sedation plan/orders and Pre-procedure letter was sent to pt    Pre-Procedure Medication Instructions:  Instructions given to pt regarding anticoagulants: Eliquis- 5mg BID start 5 days prior  Instructions given to pt regarding antiarrhythmic medication: Hold Flecainide 7 days prior and Diltiazem 2 days prior;    Instructions given to pt regarding PPI medication:Start Protonix 40mg Daily 3 days prior, and will continue 6 wks s/p PVI  Instructions for medication, other than anticoagulants and antiarrhythmics listed above, given to pt: to hold all morning medications   Allergies: Reviewed allergies, no concerns regarding orders for procedure  Allergies   Allergen Reactions     Terbinafine Hives       Current Outpatient Medications:      cholecalciferol, vitamin D3, 5,000 unit Tab, [CHOLECALCIFEROL, VITAMIN D3, 5,000 UNIT TAB] Take 2,000 Units by mouth daily.       , Disp: , Rfl:      diltiazem ER (DILT-XR) 120 MG 24 hr capsule, Take 1 capsule (120 mg) by mouth daily, Disp: 30 capsule, Rfl: 11     flecainide (TAMBOCOR) 150 MG tablet, Take 1 tablet (150 mg) by mouth 2 times daily, Disp: 60 tablet, Rfl: 4    Documentation Date:7/20/2022 2:42 PM  Zoya You RN

## 2022-07-25 ENCOUNTER — HOSPITAL ENCOUNTER (OUTPATIENT)
Facility: HOSPITAL | Age: 62
End: 2022-07-25
Attending: INTERNAL MEDICINE | Admitting: INTERNAL MEDICINE
Payer: COMMERCIAL

## 2022-07-25 DIAGNOSIS — I48.0 PAROXYSMAL ATRIAL FIBRILLATION (H): ICD-10-CM

## 2022-07-26 ENCOUNTER — TELEPHONE (OUTPATIENT)
Dept: CARDIOLOGY | Facility: CLINIC | Age: 62
End: 2022-07-26

## 2022-07-26 DIAGNOSIS — I48.0 PAROXYSMAL ATRIAL FIBRILLATION (H): Primary | ICD-10-CM

## 2022-07-26 NOTE — TELEPHONE ENCOUNTER
Return call from patient, reviewed Flecainide hold, he states understanding.  No further questions.

## 2022-07-26 NOTE — TELEPHONE ENCOUNTER
Pt scheduled for PVI with Dr. Giraldo on 9-28-22.     Per Dr. Giraldo, pt to hold Flecainide 7 days prior, last dose on Tuesday 9-20-22.  H&P scheduled for 9-21-22.    Phone call to patient to discuss hold date, no answer and message left for return call.  Information also included in letter sent to patient today.    Of note:  -Start Eliquis 5 days prior, discuss at H&P  -Hold Diltiazem 2 days prior, discuss at H&P  -Start Protonix 40 mg 3 days prior, discuss at H&P

## 2022-07-31 ENCOUNTER — HEALTH MAINTENANCE LETTER (OUTPATIENT)
Age: 62
End: 2022-07-31

## 2022-08-06 ENCOUNTER — HOSPITAL ENCOUNTER (OUTPATIENT)
Dept: ULTRASOUND IMAGING | Facility: HOSPITAL | Age: 62
Discharge: HOME | End: 2022-08-06
Attending: FAMILY MEDICINE | Admitting: FAMILY MEDICINE
Payer: COMMERCIAL

## 2022-08-06 DIAGNOSIS — Z13.6 SCREENING FOR AAA (ABDOMINAL AORTIC ANEURYSM): ICD-10-CM

## 2022-08-06 PROCEDURE — 76775 US EXAM ABDO BACK WALL LIM: CPT

## 2022-08-11 ENCOUNTER — TELEPHONE (OUTPATIENT)
Dept: CARDIOLOGY | Facility: CLINIC | Age: 62
End: 2022-08-11

## 2022-08-11 NOTE — TELEPHONE ENCOUNTER
Sent: 8/10/2022   5:44 PM CDT   To: VIVIAN Palma Sorry for the delay - correct+agreed, no need for other imaging prior.     Ne     ----- Message -----   From: Zoya You RN   Sent: 7/20/2022   2:42 PM CDT   To: MD Dr Domenica Leon ordered CTs s/p PVI   Pt had CT of PV in 2017 s/p PVI, showed no PV stenosis and pt has not had any cardiac surgery/procedures since this time   No need to repeat CT correct?   Thanks   Yi

## 2022-09-14 DIAGNOSIS — I48.0 PAROXYSMAL ATRIAL FIBRILLATION (H): ICD-10-CM

## 2022-09-14 RX ORDER — FLECAINIDE ACETATE 150 MG/1
150 TABLET ORAL 2 TIMES DAILY
Qty: 60 TABLET | Refills: 0 | Status: SHIPPED | OUTPATIENT
Start: 2022-09-14 | End: 2022-09-21

## 2022-09-20 ENCOUNTER — PREP FOR PROCEDURE (OUTPATIENT)
Dept: CARDIOLOGY | Facility: CLINIC | Age: 62
End: 2022-09-20

## 2022-09-20 DIAGNOSIS — I48.0 PAROXYSMAL ATRIAL FIBRILLATION (H): Primary | ICD-10-CM

## 2022-09-20 RX ORDER — FENTANYL CITRATE 50 UG/ML
25 INJECTION, SOLUTION INTRAMUSCULAR; INTRAVENOUS
Status: CANCELLED | OUTPATIENT
Start: 2022-09-28

## 2022-09-20 RX ORDER — LIDOCAINE 40 MG/G
CREAM TOPICAL
Status: CANCELLED | OUTPATIENT
Start: 2022-09-20

## 2022-09-20 RX ORDER — SODIUM CHLORIDE 9 MG/ML
100 INJECTION, SOLUTION INTRAVENOUS CONTINUOUS
Status: CANCELLED | OUTPATIENT
Start: 2022-09-28

## 2022-09-21 ENCOUNTER — LAB (OUTPATIENT)
Dept: CARDIOLOGY | Facility: CLINIC | Age: 62
End: 2022-09-21

## 2022-09-21 ENCOUNTER — OFFICE VISIT (OUTPATIENT)
Dept: CARDIOLOGY | Facility: CLINIC | Age: 62
End: 2022-09-21
Payer: COMMERCIAL

## 2022-09-21 ENCOUNTER — ALLIED HEALTH/NURSE VISIT (OUTPATIENT)
Dept: CARDIOLOGY | Facility: CLINIC | Age: 62
End: 2022-09-21
Payer: COMMERCIAL

## 2022-09-21 ENCOUNTER — PREP FOR PROCEDURE (OUTPATIENT)
Dept: CARDIOLOGY | Facility: CLINIC | Age: 62
End: 2022-09-21

## 2022-09-21 VITALS
WEIGHT: 225.6 LBS | DIASTOLIC BLOOD PRESSURE: 60 MMHG | HEART RATE: 58 BPM | SYSTOLIC BLOOD PRESSURE: 106 MMHG | BODY MASS INDEX: 28.05 KG/M2 | HEIGHT: 75 IN | RESPIRATION RATE: 14 BRPM

## 2022-09-21 DIAGNOSIS — I48.0 PAROXYSMAL ATRIAL FIBRILLATION (H): Primary | ICD-10-CM

## 2022-09-21 DIAGNOSIS — I48.0 PAROXYSMAL ATRIAL FIBRILLATION (H): ICD-10-CM

## 2022-09-21 LAB
ANION GAP SERPL CALCULATED.3IONS-SCNC: 12 MMOL/L (ref 7–15)
ATRIAL RATE - MUSE: 58 BPM
BUN SERPL-MCNC: 11.8 MG/DL (ref 8–23)
CALCIUM SERPL-MCNC: 9.2 MG/DL (ref 8.8–10.2)
CHLORIDE SERPL-SCNC: 104 MMOL/L (ref 98–107)
CREAT SERPL-MCNC: 0.93 MG/DL (ref 0.67–1.17)
DEPRECATED HCO3 PLAS-SCNC: 25 MMOL/L (ref 22–29)
DIASTOLIC BLOOD PRESSURE - MUSE: NORMAL MMHG
ERYTHROCYTE [DISTWIDTH] IN BLOOD BY AUTOMATED COUNT: 12.9 % (ref 10–15)
GFR SERPL CREATININE-BSD FRML MDRD: >90 ML/MIN/1.73M2
GLUCOSE SERPL-MCNC: 92 MG/DL (ref 70–99)
HCT VFR BLD AUTO: 40 % (ref 40–53)
HGB BLD-MCNC: 13.8 G/DL (ref 13.3–17.7)
INTERPRETATION ECG - MUSE: NORMAL
MCH RBC QN AUTO: 32.3 PG (ref 26.5–33)
MCHC RBC AUTO-ENTMCNC: 34.5 G/DL (ref 31.5–36.5)
MCV RBC AUTO: 94 FL (ref 78–100)
P AXIS - MUSE: 46 DEGREES
PLATELET # BLD AUTO: 249 10E3/UL (ref 150–450)
POTASSIUM SERPL-SCNC: 4.5 MMOL/L (ref 3.4–5.3)
PR INTERVAL - MUSE: 156 MS
QRS DURATION - MUSE: 94 MS
QT - MUSE: 438 MS
QTC - MUSE: 429 MS
R AXIS - MUSE: 11 DEGREES
RBC # BLD AUTO: 4.27 10E6/UL (ref 4.4–5.9)
SODIUM SERPL-SCNC: 141 MMOL/L (ref 136–145)
SYSTOLIC BLOOD PRESSURE - MUSE: NORMAL MMHG
T AXIS - MUSE: 50 DEGREES
VENTRICULAR RATE- MUSE: 58 BPM
WBC # BLD AUTO: 5 10E3/UL (ref 4–11)

## 2022-09-21 PROCEDURE — 93000 ELECTROCARDIOGRAM COMPLETE: CPT | Performed by: INTERNAL MEDICINE

## 2022-09-21 PROCEDURE — 80048 BASIC METABOLIC PNL TOTAL CA: CPT

## 2022-09-21 PROCEDURE — 85027 COMPLETE CBC AUTOMATED: CPT

## 2022-09-21 PROCEDURE — 36415 COLL VENOUS BLD VENIPUNCTURE: CPT

## 2022-09-21 PROCEDURE — 99214 OFFICE O/P EST MOD 30 MIN: CPT | Performed by: NURSE PRACTITIONER

## 2022-09-21 PROCEDURE — 99207 PR NO CHARGE NURSE ONLY: CPT

## 2022-09-21 RX ORDER — PANTOPRAZOLE SODIUM 40 MG/1
40 TABLET, DELAYED RELEASE ORAL DAILY
Qty: 45 TABLET | Refills: 0 | Status: SHIPPED | OUTPATIENT
Start: 2022-09-25 | End: 2022-11-09

## 2022-09-21 NOTE — PATIENT INSTRUCTIONS
Rogers Martinez,    It was a pleasure to see you today at the [facility].     My recommendations after this visit include:    Today, stop taking flecainide  On 9/23/2022, start Eliquis 5 mg every 12 hours to decrease stroke risk.  This will continue for at least 3 months post ablation.  On 9/25/2022, start taking pantoprazole (Protonix) 40 mg daily, to continue for 6 weeks after ablation.  On 9/26/2022, stop taking diltiazem    Repeat pulmonary vein isolation ablation with Dr. Giraldo on 9/28/2022    Telephone follow-up with the EP RN on 9/30/2022  Follow-up with me 6 weeks after ablation    Sari Avelar, [facility], Electrophysiology  [phone]  EP nurses [phone]           within normal limits

## 2022-09-21 NOTE — PROGRESS NOTES
"  HEART CARE ELECTROPHYSIOLOGY NOTE      [facility]  [phone]      Assessment/Recommendations   Assessment/Plan:  1.  Paroxysmal Atrial Fibrillation:  Initially diagnosed 10/16/2016.  He underwent PVI on 7/12/2017 with recurrent A. fib in August 2019.  Symptoms consist of palpitations, fatigue, dyspnea on exertion, and lightheadedness.  Failed antiarrhythmic therapy with flecainide.   He is scheduled for pulmonary vein isolation ablation with Dr. Giraldo on 9/28/2022.  We discussed ablation, <1-2% risk for complication, expected recovery, and follow-up.  Per Dr. Giraldo's instructions, discontinue flecainide 7 days prior to ablation and discontinue diltiazem 2 days prior to ablation.  Start pantoprazole 40 mg daily 3 days prior to ablation, to continue for 6 weeks after ablation.  He states that his questions were answered to his satisfaction and he is ready to proceed.    He was reassured that atrial fibrillation is not life-threatening, but carries an increased risk of stroke.  However, he has a [id number] score of 0.  HAS-BLED score of 0.   Per Dr. Giraldo, start Eliquis 5 mg twice daily 5 days prior to ablation.  We discussed this will continue for at least 3 months following ablation.    Telephone follow up with EP RN on 9/30/2022  Follow-up with me 6 weeks post PVI     History of Present Illness/Subjective    HPI: Rogers Martinez is a 62 year old male who comes in for EP follow up of atrial fibrillation and history and physical prior to pulmonary vein isolation ablation.  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    He was diagnosed with atrial fibrillation on 10/16/2016.  Symptoms consist of palpitations, fatigue, dyspnea exertion, lightheadedness, and a \"roller coaster feeling\" in his abdomen.  He failed antiarrhythmic therapy with flecainide.  He underwent pulmonary vein " isolation ablation with Dr. Marquez on 7/12/2017 with RF to all 4 pulmonary veins.  He had recurrence of A. fib in August 2019 for which he underwent early cardioversion in the ED.  He had recurrent AF on 3/30/2022 and 4/8/2022 after which he was started on flecainide 150 mg twice daily in addition to metoprolol succinate 25 mg daily.  Metoprolol was discontinued due to side effects and he was started on diltiazem 120 mg daily.    Rogers states that he is feeling well.  He has not had any A. fib, but does feel better when he is not on medications.  He denies chest discomfort, palpitations, abdominal fullness/bloating or peripheral edema, shortness of breath, paroxysmal nocturnal dyspnea, orthopnea, lightheadedness, dizziness, pre-syncope, or syncope.    Cardiographics (EKG personally reviewed):  EKG done 9/21/2022 shows sinus bradycardia at 58 bpm, QRS 94 ms, QT/QTc 438/429 ms.  EKG done 4/8/2022 shows atrial fibrillation with rapid ventricular response at 149 bpm.  Subsequent EKG shows sinus rhythm at 62 bpm.    Stress echo done 4/27/2022:  Patient exercised 9:55 seconds on a Mannie protocol stopping secondary to  fatigue  Appropriate heart rate and blood pressure response to exercise.Patient  achieved 84% of maximum predicted heart rate  Exercise ECG is negative for ischemia at the workload achieved  Rest Echo:Definity utilized.Normal LV function.LVEF estimate at 60-65%  Post Exercise Echo:Appropriate augmentation of systolic function without  observed wall motion abnormality  Conclusions: Exercise stress echocardiogram is negative for ischemia at the  workload achieved.Maximum heart rate achieved was mildly reduced at 84%.Post  exercise images are mildly challenging.    CTA pulmonary veins done 9/28/2017:  esophagus appears to travel behind the left   atrial near the left inferior pulmonary vein    I have reviewed and updated the patient's Past Medical History, Social History, Family History and Medication  "List.  Outside records personally reviewed.     Physical Examination  Review of Systems   Vitals: /60 (BP Location: Right arm, Patient Position: Sitting, Cuff Size: Adult Regular)   Pulse 58   Resp 14   Ht 1.905 m (6' 3\")   Wt 102.3 kg (225 lb 9.6 oz)   BMI 28.20 kg/m    BMI= Body mass index is 28.2 kg/m .  Wt Readings from Last 3 Encounters:   09/21/22 102.3 kg (225 lb 9.6 oz)   07/19/22 102.7 kg (226 lb 6.4 oz)   04/13/22 103.9 kg (229 lb)       General Appearance:   Alert, well-appearing and in no acute distress.   HEENT: Atraumatic, normocephalic.  No scleral icterus, normal conjunctivae, EOMs intact, PERRL.  Wearing a mask.   Chest/Lungs:   Chest symmetric, spine straight.  Respirations unlabored.  Lungs are clear to auscultation.   Cardiovascular:   Regular rate and rhythm.  Normal first and second heart sounds with no murmurs, rubs, or gallops; radial and posterior tibial pulses are intact, No edema.   Abdomen:  Soft, nondistended, bowel sounds present.   Extremities: No cyanosis or clubbing.   Musculoskeletal: Moves all extremities.     Skin: Warm, dry, intact.    Neurologic: Mood and affect are appropriate.  Alert and oriented to person, place, time, and situation.     ROS: 10 point ROS neg other than the symptoms noted above in the HPI.         Medical History  Surgical History Family History Social History   Past Medical History:   Diagnosis Date     Paroxysmal atrial fibrillation (H)      Past Surgical History:   Procedure Laterality Date     APPENDECTOMY       BOWEL RESECTION      with appy, age 9     CARDIOVERSION      10/16/16, 4/16/17, 8/16/19     WI EPHYS EVL TRNSPTL TX ATRIAL FIB ISOLAT PULM VEIN Left 7/12/2017    Procedure: EP Ablation PVI  RF to 4 PV;  Surgeon: Celina Marquez MD;      Family History   Problem Relation Age of Onset     Atrial fibrillation Mother      Cerebrovascular Disease Mother      Aortic aneurysm Father      Atrial fibrillation Sister      Atrial " fibrillation Brother         Social History     Socioeconomic History     Marital status:      Spouse name: Not on file     Number of children: Not on file     Years of education: Not on file     Highest education level: Not on file   Occupational History     Not on file   Tobacco Use     Smoking status: Former Smoker     Types: Cigarettes, Cigars     Smokeless tobacco: Never Used   Substance and Sexual Activity     Alcohol use: Yes     Alcohol/week: 12.0 standard drinks     Drug use: No     Sexual activity: Yes   Other Topics Concern     Not on file   Social History Narrative     Not on file     Social Determinants of Health     Financial Resource Strain: Not on file   Food Insecurity: Not on file   Transportation Needs: Not on file   Physical Activity: Not on file   Stress: Not on file   Social Connections: Not on file   Intimate Partner Violence: Not on file   Housing Stability: Not on file           Medications  Allergies   Current Outpatient Medications   Medication Sig Dispense Refill     apixaban ANTICOAGULANT (ELIQUIS) 5 MG tablet Take 1 tablet (5 mg) by mouth 2 times daily 60 tablet 4     cholecalciferol, vitamin D3, 5,000 unit Tab [CHOLECALCIFEROL, VITAMIN D3, 5,000 UNIT TAB] Take 2,000 Units by mouth daily.              [START ON 9/25/2022] pantoprazole (PROTONIX) 40 MG EC tablet Take 1 tablet (40 mg) by mouth daily Continue for 6 weeks after ablation 45 tablet 0       Allergies   Allergen Reactions     Terbinafine Hives          Lab Results    Chemistry/lipid CBC Cardiac Enzymes/BNP/TSH/INR   Recent Labs   Lab Test 07/11/22  1102   CHOL 193   HDL 46   *   TRIG 125     Recent Labs   Lab Test 07/11/22  1102 04/16/21  1212   * 141*     Recent Labs   Lab Test 07/11/22  1102      POTASSIUM 4.7   CHLORIDE 104   CO2 23   GLC 96   BUN 9.1   CR 0.89   GFRESTIMATED >90   JOSE 8.8     Recent Labs   Lab Test 07/11/22  1102 04/08/22  0728 03/30/22  0748   CR 0.89 1.00 1.01     BMP and CBC  drawn today, results pending.     Recent Labs   Lab Test 04/08/22  0728   WBC 6.2   HGB 15.0   HCT 43.9   MCV 94        Recent Labs   Lab Test 04/08/22 0728 03/30/22  0748 08/16/19  1516   HGB 15.0 15.6 15.2    Recent Labs   Lab Test 04/08/22 0728 03/30/22  0748 08/16/19  1516   TROPONINI 0.02 0.03 <0.01     No results for input(s): BNP, NTBNPI, NTBNP in the last 92809 hours.  Recent Labs   Lab Test 04/08/22  0728   TSH 2.95     No results for input(s): INR in the last 48725 hours.   39 minutes were spent on the date of encounter performing chart review, history and exam, documentation, and further activities as noted above.

## 2022-09-21 NOTE — PROGRESS NOTES
Pulmonary Vein Isolation Ablation Patient Education Visit:    Patient present for discussion.  Education completed Face to Face in the clinic on 9/21/2022 with Zoya You RN.  Please refer to documented H&P completed by EP provider in [facility] associated to this visit.    Procedural Discussion:  Reviewed pre and post op PVI procedure with pt, pre-procedure letter reviewed and given to pt- see letter in EPIC under documentation, see additional EP PVI prep note for details on procedure/prep, answered pt questions and concerns regarding procedure and reviewed with pt if pre-op labs were to be abnormal, pt would be contacted prior to procedure with results    Medication Discussion:  Anticoagulation- Reviewed importance of continuing anticoagulation uninterrupted pre and post ablation, pt instructed to call if the medication is unable to be obtained and 30 Day Free trial card/Monthly discount card sent to pt with instructions on how to activate and redeem. Per Dr Giraldo pt given instructions to start 5 days prior.  PPI- instructions given to start Protonix 40mg Daily 3 days prior to PVI, to continue for 6 weeks post PVI then stop, and RX was sent to requested pharmacy  Antiarrythmic- Flecainide hold 7 days prior, and Diltiazem 2 days prior  9/21/2022 3:07 PM  Zoya You RN

## 2022-09-21 NOTE — LETTER
9/21/2022    Ahsan Andrew MD  [facility] [address]  [facility] [address]    RE: Rogers Martinez       Dear Colleague,     I had the pleasure of seeing Rogers Martinez in the [facility].    HEART CARE ELECTROPHYSIOLOGY NOTE      [facility]  [phone]      Assessment/Recommendations   Assessment/Plan:  1.  Paroxysmal Atrial Fibrillation:  Initially diagnosed 10/16/2016.  He underwent PVI on 7/12/2017 with recurrent A. fib in August 2019.  Symptoms consist of palpitations, fatigue, dyspnea on exertion, and lightheadedness.  Failed antiarrhythmic therapy with flecainide.   He is scheduled for pulmonary vein isolation ablation with Dr. Giraldo on 9/28/2022.  We discussed ablation, <1-2% risk for complication, expected recovery, and follow-up.  Per Dr. Giraldo's instructions, discontinue flecainide 7 days prior to ablation and discontinue diltiazem 2 days prior to ablation.  Start pantoprazole 40 mg daily 3 days prior to ablation, to continue for 6 weeks after ablation.  He states that his questions were answered to his satisfaction and he is ready to proceed.    He was reassured that atrial fibrillation is not life-threatening, but carries an increased risk of stroke.  However, he has a [id number] score of 0.  HAS-BLED score of 0.   Per Dr. Giraldo, start Eliquis 5 mg twice daily 5 days prior to ablation.  We discussed this will continue for at least 3 months following ablation.    Telephone follow up with EP RN on 9/30/2022  Follow-up with me 6 weeks post PVI     History of Present Illness/Subjective    HPI: Rogers Martinez is a 62 year old male who comes in for EP follow up of atrial fibrillation and history and physical prior to pulmonary vein isolation ablation.  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    He was diagnosed with atrial  "fibrillation on 10/16/2016.  Symptoms consist of palpitations, fatigue, dyspnea exertion, lightheadedness, and a \"roller coaster feeling\" in his abdomen.  He failed antiarrhythmic therapy with flecainide.  He underwent pulmonary vein isolation ablation with Dr. Marquez on 7/12/2017 with RF to all 4 pulmonary veins.  He had recurrence of A. fib in August 2019 for which he underwent early cardioversion in the ED.  He had recurrent AF on 3/30/2022 and 4/8/2022 after which he was started on flecainide 150 mg twice daily in addition to metoprolol succinate 25 mg daily.  Metoprolol was discontinued due to side effects and he was started on diltiazem 120 mg daily.    Rogers states that he is feeling well.  He has not had any A. fib, but does feel better when he is not on medications.  He denies chest discomfort, palpitations, abdominal fullness/bloating or peripheral edema, shortness of breath, paroxysmal nocturnal dyspnea, orthopnea, lightheadedness, dizziness, pre-syncope, or syncope.    Cardiographics (EKG personally reviewed):  EKG done 9/21/2022 shows sinus bradycardia at 58 bpm, QRS 94 ms, QT/QTc 438/429 ms.  EKG done 4/8/2022 shows atrial fibrillation with rapid ventricular response at 149 bpm.  Subsequent EKG shows sinus rhythm at 62 bpm.    Stress echo done 4/27/2022:  Patient exercised 9:55 seconds on a Mannie protocol stopping secondary to  fatigue  Appropriate heart rate and blood pressure response to exercise.Patient  achieved 84% of maximum predicted heart rate  Exercise ECG is negative for ischemia at the workload achieved  Rest Echo:Definity utilized.Normal LV function.LVEF estimate at 60-65%  Post Exercise Echo:Appropriate augmentation of systolic function without  observed wall motion abnormality  Conclusions: Exercise stress echocardiogram is negative for ischemia at the  workload achieved.Maximum heart rate achieved was mildly reduced at 84%.Post  exercise images are mildly challenging.    CTA " "pulmonary veins done 9/28/2017:  esophagus appears to travel behind the left   atrial near the left inferior pulmonary vein    I have reviewed and updated the patient's Past Medical History, Social History, Family History and Medication List.  Outside records personally reviewed.     Physical Examination  Review of Systems   Vitals: /60 (BP Location: Right arm, Patient Position: Sitting, Cuff Size: Adult Regular)   Pulse 58   Resp 14   Ht 1.905 m (6' 3\")   Wt 102.3 kg (225 lb 9.6 oz)   BMI 28.20 kg/m    BMI= Body mass index is 28.2 kg/m .  Wt Readings from Last 3 Encounters:   09/21/22 102.3 kg (225 lb 9.6 oz)   07/19/22 102.7 kg (226 lb 6.4 oz)   04/13/22 103.9 kg (229 lb)       General Appearance:   Alert, well-appearing and in no acute distress.   HEENT: Atraumatic, normocephalic.  No scleral icterus, normal conjunctivae, EOMs intact, PERRL.  Wearing a mask.   Chest/Lungs:   Chest symmetric, spine straight.  Respirations unlabored.  Lungs are clear to auscultation.   Cardiovascular:   Regular rate and rhythm.  Normal first and second heart sounds with no murmurs, rubs, or gallops; radial and posterior tibial pulses are intact, No edema.   Abdomen:  Soft, nondistended, bowel sounds present.   Extremities: No cyanosis or clubbing.   Musculoskeletal: Moves all extremities.     Skin: Warm, dry, intact.    Neurologic: Mood and affect are appropriate.  Alert and oriented to person, place, time, and situation.     ROS: 10 point ROS neg other than the symptoms noted above in the HPI.         Medical History  Surgical History Family History Social History   Past Medical History:   Diagnosis Date     Paroxysmal atrial fibrillation (H)      Past Surgical History:   Procedure Laterality Date     APPENDECTOMY       BOWEL RESECTION      with appy, age 9     CARDIOVERSION      10/16/16, 4/16/17, 8/16/19     AR EPHYS EVL TRNSPTL TX ATRIAL FIB ISOLAT PULM VEIN Left 7/12/2017    Procedure: EP Ablation PVI  RF to 4 PV;  " Surgeon: Celina Marquez MD;      Family History   Problem Relation Age of Onset     Atrial fibrillation Mother      Cerebrovascular Disease Mother      Aortic aneurysm Father      Atrial fibrillation Sister      Atrial fibrillation Brother         Social History     Socioeconomic History     Marital status:      Spouse name: Not on file     Number of children: Not on file     Years of education: Not on file     Highest education level: Not on file   Occupational History     Not on file   Tobacco Use     Smoking status: Former Smoker     Types: Cigarettes, Cigars     Smokeless tobacco: Never Used   Substance and Sexual Activity     Alcohol use: Yes     Alcohol/week: 12.0 standard drinks     Drug use: No     Sexual activity: Yes   Other Topics Concern     Not on file   Social History Narrative     Not on file     Social Determinants of Health     Financial Resource Strain: Not on file   Food Insecurity: Not on file   Transportation Needs: Not on file   Physical Activity: Not on file   Stress: Not on file   Social Connections: Not on file   Intimate Partner Violence: Not on file   Housing Stability: Not on file           Medications  Allergies   Current Outpatient Medications   Medication Sig Dispense Refill     apixaban ANTICOAGULANT (ELIQUIS) 5 MG tablet Take 1 tablet (5 mg) by mouth 2 times daily 60 tablet 4     cholecalciferol, vitamin D3, 5,000 unit Tab [CHOLECALCIFEROL, VITAMIN D3, 5,000 UNIT TAB] Take 2,000 Units by mouth daily.              [START ON 9/25/2022] pantoprazole (PROTONIX) 40 MG EC tablet Take 1 tablet (40 mg) by mouth daily Continue for 6 weeks after ablation 45 tablet 0       Allergies   Allergen Reactions     Terbinafine Hives          Lab Results    Chemistry/lipid CBC Cardiac Enzymes/BNP/TSH/INR   Recent Labs   Lab Test 07/11/22  1102   CHOL 193   HDL 46   *   TRIG 125     Recent Labs   Lab Test 07/11/22  1102 04/16/21  1212   * 141*     Recent Labs   Lab Test  07/11/22  1102      POTASSIUM 4.7   CHLORIDE 104   CO2 23   GLC 96   BUN 9.1   CR 0.89   GFRESTIMATED >90   JOSE 8.8     Recent Labs   Lab Test 07/11/22  1102 04/08/22  0728 03/30/22  0748   CR 0.89 1.00 1.01     BMP and CBC drawn today, results pending.     Recent Labs   Lab Test 04/08/22  0728   WBC 6.2   HGB 15.0   HCT 43.9   MCV 94        Recent Labs   Lab Test 04/08/22  0728 03/30/22  0748 08/16/19  1516   HGB 15.0 15.6 15.2    Recent Labs   Lab Test 04/08/22  0728 03/30/22  0748 08/16/19  1516   TROPONINI 0.02 0.03 <0.01     No results for input(s): BNP, NTBNPI, NTBNP in the last 50074 hours.  Recent Labs   Lab Test 04/08/22  0728   TSH 2.95     No results for input(s): INR in the last 86672 hours.   39 minutes were spent on the date of encounter performing chart review, history and exam, documentation, and further activities as noted above.          Thank you for allowing me to participate in the care of your patient.      Sincerely,     FABBY [facility]  cc:   No referring provider defined for this encounter.

## 2022-09-21 NOTE — H&P (VIEW-ONLY)
"  HEART CARE ELECTROPHYSIOLOGY NOTE      [facility]  [phone]      Assessment/Recommendations   Assessment/Plan:  1.  Paroxysmal Atrial Fibrillation:  Initially diagnosed 10/16/2016.  He underwent PVI on 7/12/2017 with recurrent A. fib in August 2019.  Symptoms consist of palpitations, fatigue, dyspnea on exertion, and lightheadedness.  Failed antiarrhythmic therapy with flecainide.   He is scheduled for pulmonary vein isolation ablation with Dr. Giraldo on 9/28/2022.  We discussed ablation, <1-2% risk for complication, expected recovery, and follow-up.  Per Dr. Giraldo's instructions, discontinue flecainide 7 days prior to ablation and discontinue diltiazem 2 days prior to ablation.  Start pantoprazole 40 mg daily 3 days prior to ablation, to continue for 6 weeks after ablation.  He states that his questions were answered to his satisfaction and he is ready to proceed.    He was reassured that atrial fibrillation is not life-threatening, but carries an increased risk of stroke.  However, he has a [id number] score of 0.  HAS-BLED score of 0.   Per Dr. Giraldo, start Eliquis 5 mg twice daily 5 days prior to ablation.  We discussed this will continue for at least 3 months following ablation.    Telephone follow up with EP RN on 9/30/2022  Follow-up with me 6 weeks post PVI     History of Present Illness/Subjective    HPI: Rogers Martinez is a 62 year old male who comes in for EP follow up of atrial fibrillation and history and physical prior to pulmonary vein isolation ablation.  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    He was diagnosed with atrial fibrillation on 10/16/2016.  Symptoms consist of palpitations, fatigue, dyspnea exertion, lightheadedness, and a \"roller coaster feeling\" in his abdomen.  He failed antiarrhythmic therapy with flecainide.  He underwent pulmonary vein " isolation ablation with Dr. Marquez on 7/12/2017 with RF to all 4 pulmonary veins.  He had recurrence of A. fib in August 2019 for which he underwent early cardioversion in the ED.  He had recurrent AF on 3/30/2022 and 4/8/2022 after which he was started on flecainide 150 mg twice daily in addition to metoprolol succinate 25 mg daily.  Metoprolol was discontinued due to side effects and he was started on diltiazem 120 mg daily.    Rogers states that he is feeling well.  He has not had any A. fib, but does feel better when he is not on medications.  He denies chest discomfort, palpitations, abdominal fullness/bloating or peripheral edema, shortness of breath, paroxysmal nocturnal dyspnea, orthopnea, lightheadedness, dizziness, pre-syncope, or syncope.    Cardiographics (EKG personally reviewed):  EKG done 9/21/2022 shows sinus bradycardia at 58 bpm, QRS 94 ms, QT/QTc 438/429 ms.  EKG done 4/8/2022 shows atrial fibrillation with rapid ventricular response at 149 bpm.  Subsequent EKG shows sinus rhythm at 62 bpm.    Stress echo done 4/27/2022:  Patient exercised 9:55 seconds on a Mannie protocol stopping secondary to  fatigue  Appropriate heart rate and blood pressure response to exercise.Patient  achieved 84% of maximum predicted heart rate  Exercise ECG is negative for ischemia at the workload achieved  Rest Echo:Definity utilized.Normal LV function.LVEF estimate at 60-65%  Post Exercise Echo:Appropriate augmentation of systolic function without  observed wall motion abnormality  Conclusions: Exercise stress echocardiogram is negative for ischemia at the  workload achieved.Maximum heart rate achieved was mildly reduced at 84%.Post  exercise images are mildly challenging.    CTA pulmonary veins done 9/28/2017:  esophagus appears to travel behind the left   atrial near the left inferior pulmonary vein    I have reviewed and updated the patient's Past Medical History, Social History, Family History and Medication  "List.  Outside records personally reviewed.     Physical Examination  Review of Systems   Vitals: /60 (BP Location: Right arm, Patient Position: Sitting, Cuff Size: Adult Regular)   Pulse 58   Resp 14   Ht 1.905 m (6' 3\")   Wt 102.3 kg (225 lb 9.6 oz)   BMI 28.20 kg/m    BMI= Body mass index is 28.2 kg/m .  Wt Readings from Last 3 Encounters:   09/21/22 102.3 kg (225 lb 9.6 oz)   07/19/22 102.7 kg (226 lb 6.4 oz)   04/13/22 103.9 kg (229 lb)       General Appearance:   Alert, well-appearing and in no acute distress.   HEENT: Atraumatic, normocephalic.  No scleral icterus, normal conjunctivae, EOMs intact, PERRL.  Wearing a mask.   Chest/Lungs:   Chest symmetric, spine straight.  Respirations unlabored.  Lungs are clear to auscultation.   Cardiovascular:   Regular rate and rhythm.  Normal first and second heart sounds with no murmurs, rubs, or gallops; radial and posterior tibial pulses are intact, No edema.   Abdomen:  Soft, nondistended, bowel sounds present.   Extremities: No cyanosis or clubbing.   Musculoskeletal: Moves all extremities.     Skin: Warm, dry, intact.    Neurologic: Mood and affect are appropriate.  Alert and oriented to person, place, time, and situation.     ROS: 10 point ROS neg other than the symptoms noted above in the HPI.         Medical History  Surgical History Family History Social History   Past Medical History:   Diagnosis Date     Paroxysmal atrial fibrillation (H)      Past Surgical History:   Procedure Laterality Date     APPENDECTOMY       BOWEL RESECTION      with appy, age 9     CARDIOVERSION      10/16/16, 4/16/17, 8/16/19     [place] EPHYS EVL TRNSPTL [place] ATRIAL FIB ISOLAT PULM VEIN Left 7/12/2017    Procedure: EP Ablation PVI  RF to 4 PV;  Surgeon: Celina Marquez MD;      Family History   Problem Relation Age of Onset     Atrial fibrillation Mother      Cerebrovascular Disease Mother      Aortic aneurysm Father      Atrial fibrillation Sister      Atrial " fibrillation Brother         Social History     Socioeconomic History     Marital status:      Spouse name: Not on file     Number of children: Not on file     Years of education: Not on file     Highest education level: Not on file   Occupational History     Not on file   Tobacco Use     Smoking status: Former Smoker     Types: Cigarettes, Cigars     Smokeless tobacco: Never Used   Substance and Sexual Activity     Alcohol use: Yes     Alcohol/week: 12.0 standard drinks     Drug use: No     Sexual activity: Yes   Other Topics Concern     Not on file   Social History Narrative     Not on file     Social Determinants of Health     Financial Resource Strain: Not on file   Food Insecurity: Not on file   Transportation Needs: Not on file   Physical Activity: Not on file   Stress: Not on file   Social Connections: Not on file   Intimate Partner Violence: Not on file   Housing Stability: Not on file           Medications  Allergies   Current Outpatient Medications   Medication Sig Dispense Refill     apixaban ANTICOAGULANT (ELIQUIS) 5 MG tablet Take 1 tablet (5 mg) by mouth 2 times daily 60 tablet 4     cholecalciferol, vitamin D3, 5,000 unit Tab [CHOLECALCIFEROL, VITAMIN D3, 5,000 UNIT TAB] Take 2,000 Units by mouth daily.              [START ON 9/25/2022] pantoprazole (PROTONIX) 40 MG EC tablet Take 1 tablet (40 mg) by mouth daily Continue for 6 weeks after ablation 45 tablet 0       Allergies   Allergen Reactions     Terbinafine Hives          Lab Results    Chemistry/lipid CBC Cardiac Enzymes/BNP/TSH/INR   Recent Labs   Lab Test 07/11/22  1102   CHOL 193   HDL 46   *   TRIG 125     Recent Labs   Lab Test 07/11/22  1102 04/16/21  1212   * 141*     Recent Labs   Lab Test 07/11/22  1102      POTASSIUM 4.7   CHLORIDE 104   CO2 23   GLC 96   BUN 9.1   CR 0.89   GFRESTIMATED >90   JOSE 8.8     Recent Labs   Lab Test 07/11/22  1102 04/08/22  0728 03/30/22  0748   CR 0.89 1.00 1.01     BMP and CBC  drawn today, results pending.     Recent Labs   Lab Test 04/08/22  0728   WBC 6.2   HGB 15.0   HCT 43.9   MCV 94        Recent Labs   Lab Test 04/08/22 0728 03/30/22  0748 08/16/19  1516   HGB 15.0 15.6 15.2    Recent Labs   Lab Test 04/08/22 0728 03/30/22  0748 08/16/19  1516   TROPONINI 0.02 0.03 <0.01     No results for input(s): BNP, NTBNPI, NTBNP in the last 13318 hours.  Recent Labs   Lab Test 04/08/22  0728   TSH 2.95     No results for input(s): INR in the last 79570 hours.   39 minutes were spent on the date of encounter performing chart review, history and exam, documentation, and further activities as noted above.

## 2022-09-27 ENCOUNTER — ANESTHESIA EVENT (OUTPATIENT)
Dept: CARDIOLOGY | Facility: HOSPITAL | Age: 62
End: 2022-09-27
Payer: COMMERCIAL

## 2022-09-28 ENCOUNTER — ANESTHESIA (OUTPATIENT)
Dept: CARDIOLOGY | Facility: HOSPITAL | Age: 62
End: 2022-09-28
Payer: COMMERCIAL

## 2022-09-28 ENCOUNTER — HOSPITAL ENCOUNTER (OUTPATIENT)
Facility: HOSPITAL | Age: 62
Discharge: HOME | End: 2022-09-28
Attending: INTERNAL MEDICINE | Admitting: INTERNAL MEDICINE
Payer: COMMERCIAL

## 2022-09-28 VITALS
TEMPERATURE: 98.2 F | WEIGHT: 221.7 LBS | OXYGEN SATURATION: 98 % | BODY MASS INDEX: 27.57 KG/M2 | SYSTOLIC BLOOD PRESSURE: 127 MMHG | RESPIRATION RATE: 17 BRPM | HEART RATE: 66 BPM | HEIGHT: 75 IN | DIASTOLIC BLOOD PRESSURE: 73 MMHG

## 2022-09-28 DIAGNOSIS — I48.0 PAROXYSMAL ATRIAL FIBRILLATION (H): ICD-10-CM

## 2022-09-28 LAB
ABO/RH(D): NORMAL
ABO/RH(D): NORMAL
ACT BLD: 326 SECONDS (ref 74–150)
ACT BLD: 378 SECONDS (ref 74–150)
ANION GAP SERPL CALCULATED.3IONS-SCNC: 10 MMOL/L (ref 7–15)
ANTIBODY SCREEN: NEGATIVE
ATRIAL RATE - MUSE: 60 BPM
BUN SERPL-MCNC: 10.9 MG/DL (ref 8–23)
CALCIUM SERPL-MCNC: 8.4 MG/DL (ref 8.8–10.2)
CHLORIDE SERPL-SCNC: 104 MMOL/L (ref 98–107)
CREAT SERPL-MCNC: 0.92 MG/DL (ref 0.67–1.17)
DEPRECATED HCO3 PLAS-SCNC: 25 MMOL/L (ref 22–29)
DIASTOLIC BLOOD PRESSURE - MUSE: NORMAL MMHG
ERYTHROCYTE [DISTWIDTH] IN BLOOD BY AUTOMATED COUNT: 13.2 % (ref 10–15)
GFR SERPL CREATININE-BSD FRML MDRD: >90 ML/MIN/1.73M2
GLUCOSE SERPL-MCNC: 101 MG/DL (ref 70–99)
HCT VFR BLD AUTO: 39.6 % (ref 40–53)
HGB BLD-MCNC: 13.7 G/DL (ref 13.3–17.7)
INTERPRETATION ECG - MUSE: NORMAL
MCH RBC QN AUTO: 32.4 PG (ref 26.5–33)
MCHC RBC AUTO-ENTMCNC: 34.6 G/DL (ref 31.5–36.5)
MCV RBC AUTO: 94 FL (ref 78–100)
P AXIS - MUSE: 60 DEGREES
PLATELET # BLD AUTO: 228 10E3/UL (ref 150–450)
POTASSIUM SERPL-SCNC: 3.9 MMOL/L (ref 3.4–5.3)
PR INTERVAL - MUSE: 162 MS
QRS DURATION - MUSE: 88 MS
QT - MUSE: 448 MS
QTC - MUSE: 448 MS
R AXIS - MUSE: 32 DEGREES
RBC # BLD AUTO: 4.23 10E6/UL (ref 4.4–5.9)
SODIUM SERPL-SCNC: 139 MMOL/L (ref 136–145)
SPECIMEN EXPIRATION DATE: NORMAL
SPECIMEN EXPIRATION DATE: NORMAL
SYSTOLIC BLOOD PRESSURE - MUSE: NORMAL MMHG
T AXIS - MUSE: 27 DEGREES
VENTRICULAR RATE- MUSE: 60 BPM
WBC # BLD AUTO: 5.1 10E3/UL (ref 4–11)

## 2022-09-28 PROCEDURE — 999N000054 HC STATISTIC EKG NON-CHARGEABLE

## 2022-09-28 PROCEDURE — 93623 PRGRMD STIMJ&PACG IV RX NFS: CPT | Performed by: INTERNAL MEDICINE

## 2022-09-28 PROCEDURE — 272N000001 HC OR GENERAL SUPPLY STERILE: Performed by: INTERNAL MEDICINE

## 2022-09-28 PROCEDURE — 36415 COLL VENOUS BLD VENIPUNCTURE: CPT | Performed by: INTERNAL MEDICINE

## 2022-09-28 PROCEDURE — 93656 COMPRE EP EVAL ABLTJ ATR FIB: CPT | Performed by: INTERNAL MEDICINE

## 2022-09-28 PROCEDURE — 258N000003 HC RX IP 258 OP 636: Performed by: INTERNAL MEDICINE

## 2022-09-28 PROCEDURE — 250N000011 HC RX IP 250 OP 636: Performed by: INTERNAL MEDICINE

## 2022-09-28 PROCEDURE — 86901 BLOOD TYPING SEROLOGIC RH(D): CPT | Performed by: INTERNAL MEDICINE

## 2022-09-28 PROCEDURE — C1769 GUIDE WIRE: HCPCS | Performed by: INTERNAL MEDICINE

## 2022-09-28 PROCEDURE — 85347 COAGULATION TIME ACTIVATED: CPT

## 2022-09-28 PROCEDURE — 370N000017 HC ANESTHESIA TECHNICAL FEE, PER MIN: Performed by: INTERNAL MEDICINE

## 2022-09-28 PROCEDURE — 93005 ELECTROCARDIOGRAM TRACING: CPT

## 2022-09-28 PROCEDURE — C1733 CATH, EP, OTHR THAN COOL-TIP: HCPCS | Performed by: INTERNAL MEDICINE

## 2022-09-28 PROCEDURE — 250N000009 HC RX 250: Performed by: ANESTHESIOLOGY

## 2022-09-28 PROCEDURE — C1894 INTRO/SHEATH, NON-LASER: HCPCS | Performed by: INTERNAL MEDICINE

## 2022-09-28 PROCEDURE — 85027 COMPLETE CBC AUTOMATED: CPT | Performed by: INTERNAL MEDICINE

## 2022-09-28 PROCEDURE — 250N000011 HC RX IP 250 OP 636: Performed by: ANESTHESIOLOGY

## 2022-09-28 PROCEDURE — 82310 ASSAY OF CALCIUM: CPT | Performed by: INTERNAL MEDICINE

## 2022-09-28 PROCEDURE — 250N000009 HC RX 250: Performed by: INTERNAL MEDICINE

## 2022-09-28 PROCEDURE — C1730 CATH, EP, 19 OR FEW ELECT: HCPCS | Performed by: INTERNAL MEDICINE

## 2022-09-28 PROCEDURE — C1732 CATH, EP, DIAG/ABL, 3D/VECT: HCPCS | Performed by: INTERNAL MEDICINE

## 2022-09-28 PROCEDURE — 250N000011 HC RX IP 250 OP 636

## 2022-09-28 PROCEDURE — 710N000010 HC RECOVERY PHASE 1, LEVEL 2, PER MIN

## 2022-09-28 PROCEDURE — 258N000003 HC RX IP 258 OP 636: Performed by: ANESTHESIOLOGY

## 2022-09-28 PROCEDURE — C1887 CATHETER, GUIDING: HCPCS | Performed by: INTERNAL MEDICINE

## 2022-09-28 PROCEDURE — C1759 CATH, INTRA ECHOCARDIOGRAPHY: HCPCS | Performed by: INTERNAL MEDICINE

## 2022-09-28 PROCEDURE — 93010 ELECTROCARDIOGRAM REPORT: CPT | Performed by: INTERNAL MEDICINE

## 2022-09-28 RX ORDER — DEXAMETHASONE SODIUM PHOSPHATE 10 MG/ML
INJECTION, SOLUTION INTRAMUSCULAR; INTRAVENOUS PRN
Status: DISCONTINUED | OUTPATIENT
Start: 2022-09-28 | End: 2022-09-28

## 2022-09-28 RX ORDER — FENTANYL CITRATE 50 UG/ML
50 INJECTION, SOLUTION INTRAMUSCULAR; INTRAVENOUS EVERY 5 MIN PRN
Status: DISCONTINUED | OUTPATIENT
Start: 2022-09-28 | End: 2022-09-28 | Stop reason: HOSPADM

## 2022-09-28 RX ORDER — PROTAMINE SULFATE 10 MG/ML
INJECTION, SOLUTION INTRAVENOUS PRN
Status: DISCONTINUED | OUTPATIENT
Start: 2022-09-28 | End: 2022-09-28

## 2022-09-28 RX ORDER — ONDANSETRON 2 MG/ML
4 INJECTION INTRAMUSCULAR; INTRAVENOUS EVERY 30 MIN PRN
Status: DISCONTINUED | OUTPATIENT
Start: 2022-09-28 | End: 2022-09-28 | Stop reason: HOSPADM

## 2022-09-28 RX ORDER — LIDOCAINE 40 MG/G
CREAM TOPICAL
Status: DISCONTINUED | OUTPATIENT
Start: 2022-09-28 | End: 2022-09-28 | Stop reason: HOSPADM

## 2022-09-28 RX ORDER — NALOXONE HYDROCHLORIDE 0.4 MG/ML
0.2 INJECTION, SOLUTION INTRAMUSCULAR; INTRAVENOUS; SUBCUTANEOUS
Status: DISCONTINUED | OUTPATIENT
Start: 2022-09-28 | End: 2022-09-28 | Stop reason: HOSPADM

## 2022-09-28 RX ORDER — FENTANYL CITRATE 50 UG/ML
25 INJECTION, SOLUTION INTRAMUSCULAR; INTRAVENOUS
Status: DISCONTINUED | OUTPATIENT
Start: 2022-09-28 | End: 2022-09-28 | Stop reason: HOSPADM

## 2022-09-28 RX ORDER — SODIUM CHLORIDE 9 MG/ML
INJECTION, SOLUTION INTRAVENOUS CONTINUOUS PRN
Status: DISCONTINUED | OUTPATIENT
Start: 2022-09-28 | End: 2022-09-28

## 2022-09-28 RX ORDER — SODIUM CHLORIDE, SODIUM LACTATE, POTASSIUM CHLORIDE, CALCIUM CHLORIDE 600; 310; 30; 20 MG/100ML; MG/100ML; MG/100ML; MG/100ML
INJECTION, SOLUTION INTRAVENOUS CONTINUOUS
Status: DISCONTINUED | OUTPATIENT
Start: 2022-09-28 | End: 2022-09-28 | Stop reason: HOSPADM

## 2022-09-28 RX ORDER — SODIUM CHLORIDE 9 MG/ML
100 INJECTION, SOLUTION INTRAVENOUS CONTINUOUS
Status: DISCONTINUED | OUTPATIENT
Start: 2022-09-28 | End: 2022-09-28 | Stop reason: HOSPADM

## 2022-09-28 RX ORDER — ONDANSETRON 2 MG/ML
4 INJECTION INTRAMUSCULAR; INTRAVENOUS EVERY 6 HOURS PRN
Status: DISCONTINUED | OUTPATIENT
Start: 2022-09-28 | End: 2022-09-28 | Stop reason: HOSPADM

## 2022-09-28 RX ORDER — ONDANSETRON 4 MG/1
4 TABLET, ORALLY DISINTEGRATING ORAL EVERY 30 MIN PRN
Status: DISCONTINUED | OUTPATIENT
Start: 2022-09-28 | End: 2022-09-28 | Stop reason: HOSPADM

## 2022-09-28 RX ORDER — NALOXONE HYDROCHLORIDE 0.4 MG/ML
0.4 INJECTION, SOLUTION INTRAMUSCULAR; INTRAVENOUS; SUBCUTANEOUS
Status: DISCONTINUED | OUTPATIENT
Start: 2022-09-28 | End: 2022-09-28 | Stop reason: HOSPADM

## 2022-09-28 RX ORDER — FENTANYL CITRATE 50 UG/ML
INJECTION, SOLUTION INTRAMUSCULAR; INTRAVENOUS PRN
Status: DISCONTINUED | OUTPATIENT
Start: 2022-09-28 | End: 2022-09-28

## 2022-09-28 RX ORDER — FLECAINIDE ACETATE 150 MG/1
150 TABLET ORAL EVERY 12 HOURS PRN
COMMUNITY
Start: 2022-09-28 | End: 2022-10-21

## 2022-09-28 RX ORDER — ACETAMINOPHEN 325 MG/1
650 TABLET ORAL EVERY 4 HOURS PRN
Status: DISCONTINUED | OUTPATIENT
Start: 2022-09-28 | End: 2022-09-28 | Stop reason: HOSPADM

## 2022-09-28 RX ORDER — ONDANSETRON 2 MG/ML
INJECTION INTRAMUSCULAR; INTRAVENOUS PRN
Status: DISCONTINUED | OUTPATIENT
Start: 2022-09-28 | End: 2022-09-28

## 2022-09-28 RX ORDER — HEPARIN SODIUM 10000 [USP'U]/100ML
INJECTION, SOLUTION INTRAVENOUS CONTINUOUS PRN
Status: DISCONTINUED | OUTPATIENT
Start: 2022-09-28 | End: 2022-09-28 | Stop reason: HOSPADM

## 2022-09-28 RX ORDER — PROPOFOL 10 MG/ML
INJECTION, EMULSION INTRAVENOUS PRN
Status: DISCONTINUED | OUTPATIENT
Start: 2022-09-28 | End: 2022-09-28

## 2022-09-28 RX ORDER — HEPARIN SODIUM 1000 [USP'U]/ML
INJECTION, SOLUTION INTRAVENOUS; SUBCUTANEOUS
Status: DISCONTINUED | OUTPATIENT
Start: 2022-09-28 | End: 2022-09-28 | Stop reason: HOSPADM

## 2022-09-28 RX ORDER — DILTIAZEM HYDROCHLORIDE 120 MG/1
120 CAPSULE, EXTENDED RELEASE ORAL DAILY
COMMUNITY
Start: 2022-09-28 | End: 2022-11-09

## 2022-09-28 RX ORDER — ONDANSETRON 4 MG/1
4 TABLET, ORALLY DISINTEGRATING ORAL EVERY 6 HOURS PRN
Status: DISCONTINUED | OUTPATIENT
Start: 2022-09-28 | End: 2022-09-28 | Stop reason: HOSPADM

## 2022-09-28 RX ORDER — ADENOSINE 3 MG/ML
INJECTION, SOLUTION INTRAVENOUS
Status: DISCONTINUED | OUTPATIENT
Start: 2022-09-28 | End: 2022-09-28 | Stop reason: HOSPADM

## 2022-09-28 RX ORDER — IBUPROFEN 600 MG/1
600 TABLET, FILM COATED ORAL EVERY 6 HOURS PRN
Status: DISCONTINUED | OUTPATIENT
Start: 2022-09-28 | End: 2022-09-28 | Stop reason: HOSPADM

## 2022-09-28 RX ORDER — OXYCODONE HYDROCHLORIDE 5 MG/1
5 TABLET ORAL EVERY 4 HOURS PRN
Status: DISCONTINUED | OUTPATIENT
Start: 2022-09-28 | End: 2022-09-28 | Stop reason: HOSPADM

## 2022-09-28 RX ORDER — HYDROMORPHONE HYDROCHLORIDE 1 MG/ML
0.2 INJECTION, SOLUTION INTRAMUSCULAR; INTRAVENOUS; SUBCUTANEOUS EVERY 5 MIN PRN
Status: DISCONTINUED | OUTPATIENT
Start: 2022-09-28 | End: 2022-09-28 | Stop reason: HOSPADM

## 2022-09-28 RX ADMIN — PHENYLEPHRINE HYDROCHLORIDE 0.2 MCG/KG/MIN: 10 INJECTION INTRAVENOUS at 07:34

## 2022-09-28 RX ADMIN — ONDANSETRON 4 MG: 2 INJECTION INTRAMUSCULAR; INTRAVENOUS at 08:51

## 2022-09-28 RX ADMIN — PHENYLEPHRINE HYDROCHLORIDE 100 MCG: 10 INJECTION INTRAVENOUS at 08:37

## 2022-09-28 RX ADMIN — SODIUM CHLORIDE 100 ML/HR: 9 INJECTION, SOLUTION INTRAVENOUS at 06:34

## 2022-09-28 RX ADMIN — FENTANYL CITRATE 100 MCG: 50 INJECTION, SOLUTION INTRAMUSCULAR; INTRAVENOUS at 07:15

## 2022-09-28 RX ADMIN — PROTAMINE SULFATE 50 MG: 10 INJECTION, SOLUTION INTRAVENOUS at 08:48

## 2022-09-28 RX ADMIN — SUGAMMADEX 200 MG: 100 INJECTION, SOLUTION INTRAVENOUS at 08:48

## 2022-09-28 RX ADMIN — PHENYLEPHRINE HYDROCHLORIDE 50 MCG: 10 INJECTION INTRAVENOUS at 07:34

## 2022-09-28 RX ADMIN — PROPOFOL 150 MG: 10 INJECTION, EMULSION INTRAVENOUS at 07:15

## 2022-09-28 RX ADMIN — SODIUM CHLORIDE: 9 INJECTION, SOLUTION INTRAVENOUS at 07:07

## 2022-09-28 RX ADMIN — DEXAMETHASONE SODIUM PHOSPHATE 10 MG: 10 INJECTION, SOLUTION INTRAMUSCULAR; INTRAVENOUS at 07:15

## 2022-09-28 RX ADMIN — MIDAZOLAM 2 MG: 1 INJECTION INTRAMUSCULAR; INTRAVENOUS at 07:09

## 2022-09-28 RX ADMIN — ROCURONIUM BROMIDE 50 MG: 50 INJECTION, SOLUTION INTRAVENOUS at 07:15

## 2022-09-28 RX ADMIN — PHENYLEPHRINE HYDROCHLORIDE 100 MCG: 10 INJECTION INTRAVENOUS at 08:35

## 2022-09-28 RX ADMIN — PHENYLEPHRINE HYDROCHLORIDE 100 MCG: 10 INJECTION INTRAVENOUS at 08:43

## 2022-09-28 ASSESSMENT — ENCOUNTER SYMPTOMS: DYSRHYTHMIAS: 1

## 2022-09-28 ASSESSMENT — ACTIVITIES OF DAILY LIVING (ADL)
ADLS_ACUITY_SCORE: 35

## 2022-09-28 ASSESSMENT — LIFESTYLE VARIABLES: TOBACCO_USE: 1

## 2022-09-28 NOTE — ANESTHESIA PROCEDURE NOTES
Airway       Patient location during procedure: OR       Procedure Start/Stop Times: 9/28/2022 7:19 AM  Staff -        Anesthesiologist:  Felisha Giordano MD       CRNA: Jenna Alan APRN CRNA       Other Anesthesia Staff: Joseph Leach       Performed By: SRNA  Consent for Airway        Urgency: elective  Indications and Patient Condition       Indications for airway management: anastacio-procedural       Induction type:intravenous       Mask difficulty assessment: 1 - vent by mask    Final Airway Details       Final airway type: endotracheal airway       Successful airway: ETT - single  Endotracheal Airway Details        ETT size (mm): 8.0       Cuffed: yes       Cuff volume (mL): 10       Successful intubation technique: direct laryngoscopy       DL Blade Type: Levy 2       Grade View of Cords: 1       Adjucts: stylet and tooth guard       Position: Left       Measured from: lips       Secured at (cm): 22       Bite block used: None    Post intubation assessment        Placement verified by: capnometry, equal breath sounds and chest rise        Number of attempts at approach: 1       Number of other approaches attempted: 0       Secured with: silk tape       Ease of procedure: easy       Dentition: Intact and Unchanged    Medication(s) Administered   Medication Administration Time: 9/28/2022 7:19 AM

## 2022-09-28 NOTE — ANESTHESIA POSTPROCEDURE EVALUATION
Patient: Rogers ADHIKARI Stone    Procedure: Procedure(s):  Ablation Pulmonary Vein Isolation       Anesthesia Type:  General    Note:  Disposition: Outpatient   Postop Pain Control: Uneventful            Sign Out: Well controlled pain   PONV: No   Neuro/Psych: Uneventful            Sign Out: Acceptable/Baseline neuro status   Airway/Respiratory: Uneventful            Sign Out: Acceptable/Baseline resp. status   CV/Hemodynamics: Uneventful            Sign Out: Acceptable CV status; No obvious hypovolemia; No obvious fluid overload   Other NRE: NONE   DID A NON-ROUTINE EVENT OCCUR?            Last vitals:  Vitals Value Taken Time   /70 09/28/22 1000   Temp 36.8  C (98.2  F) 09/28/22 0915   Pulse 57 09/28/22 1000   Resp 8 09/28/22 1000   SpO2 99 % 09/28/22 1000       Electronically Signed By: Felisha Britton MD  September 28, 2022  1:13 PM

## 2022-09-28 NOTE — INTERVAL H&P NOTE
"I have reviewed the surgical (or preoperative) H&P that is linked to this encounter, and examined the patient. There are no significant changes    Clinical Conditions Present on Arrival:  Clinically Significant Risk Factors Present on Admission                 # Coagulation Defect: home medication list includes an anticoagulant medication   # Overweight: Estimated body mass index is 27.71 kg/m  as calculated from the following:    Height as of this encounter: 1.905 m (6' 3\").    Weight as of this encounter: 100.6 kg (221 lb 11.2 oz).       "

## 2022-09-28 NOTE — Clinical Note
Arrhythmia Type: atrial fibrillation.   Method of Cardioversion: synchronous.   The arrhythmia was terminated.   Energy shock delivered: 300 joules.   Time shock delivered: 08:32 CDT.   Post cardioversion rhythm: sinus rhythm.   Early return to atrial fibrillation (ERAF).   No immediate return to atrial fibrillation (IRAF).

## 2022-09-28 NOTE — Clinical Note
suture utilizedfor a figure 8 purse-string for closure of site.  Manual pressure applied by scrub person; hemostasis achieved.

## 2022-09-28 NOTE — DISCHARGE INSTRUCTIONS
[facility] [place]  Cardiac Electrophysiology  [address]  [place] [address]   Office: [phone]  Fax: [phone]     Cardiac Electrophysiology - Post Ablation Discharge Instructions      PROCEDURE   Redo atrial fibrillation ablation         MEDICATION INSTRUCTIONS   Continue taking your prior to ablation procedure medications including flecainide 150mg twice daily and diltiazem CD 120mg daily for now (we will assess for stopping these two medications at your 6 week follow-up visit)  Continue taking your blood thinner apixaban (Eliquis).         DISCHARGE INSTRUCTIONS   General instructions  Have an adult stay with you until tomorrow.  You may resume your normal diet.    You may shower tomorrow.  Do NOT take a bath, or use a hot tub or pool for at least 1 week. Do not scrub the site. Do not use lotion or powder near the puncture site.    Groin care instructions  For the first 24 hrs - check the puncture site every 1-2 hours while awake.  You may keep a bandaid over the puncture sites for 1 or 2 days post-procedure and thereafter may keep these sites uncovered.  Change the bandaid daily.  If there is minor oozing, apply another bandaid and remove it after 12 hours.  For 2 days, when you cough, sneeze, laugh or move your bowels, hold your hand over the puncture site and press firmly.  Mild bruising at the access sites is normal.  If you notice increased swelling, external bleeding, or have other concerns regarding your access sites please consider emergency department evaluation and call your electrophysiology team's office    Activity recommendations  Do not drive for 3 days.  Avoid stooping or squatting more than 90 degrees at the hips for 7 days  Avoid repetitive motions such as loading , vacuuming, raking or shoveling  Avoid heavy lifting (greater than 25lbs) for 1 week    Post ablation instructions  You may have some irregular heartbeats following your ablation.   These may feel very strong and feel like atrial fibrillation re-initiation is imminent - these episodes should occur less frequently over time.  Recurrent atrial fibrillation can occur within the first 3 months post ablation while your heart recovers from the procedure.  Pleuritic chest discomfort (chest pain worse with taking deep breaths, worse with laying flat on your back) can occur after ablation, usually coming about within the first 24-48hrs post ablation.  If this occurs and is severe enough to be troublesome to you, please call us and consider starting a course of ibuprofen 400mg three times daily for 5 to 7 days    Things to watch for  As with any type of procedure, please be more attentive to unusual symptoms post ablation (eg. fever, neurologic changes, pain with swallowing, loss of consciousness, etc) - we recommend ER evaluation for any such symptoms in the first few weeks post procedure.    Consider ER evaluation for the following:  Severe chest pain not relieved by Tylenol or Ibuprofen  You have chills or a fever greater than 101 F (38 C)  Neurologic changes (eg. leg, arm or face weakness or numbness, difficulties with speech or word finding, problems walking or with your balance, vision changes)  Severe difficulty swallowing and/or you are coughing up blood  Shortness of breath  Increased groin pain or a large or growing hard lump around the site  Groin is red, swollen, hot or tender  Blood or fluid is draining from the groin site  Any numbness, coolness or changes in color in your extremities  Groin pain not relieved by Tylenol or Advil  Recurrent atrial fibrillation associated with sustained rapid heart rates or associated with additional concerning symptoms.    Our office will have a follow-up visit scheduled for you in approximately 6 weeks.  Please do not hesitate to call us before that time should issues arise.        [facility]    [phone]    If you are  calling after hours, please listen to the entire voicemail,   a live  will answer at the end of the message.    [phone] to reach the EP nurses working with Dr Giraldo

## 2022-09-28 NOTE — Clinical Note
Arrhythmia Type: atrial fibrillation.   Method of Cardioversion: synchronous.   The arrhythmia was terminated.   Energy shock delivered: 300 joules.   Time shock delivered: 08:34 CDT.   Post cardioversion rhythm: sinus rhythm.   No early return to atrial fibrillation (ERAF). No immediate return to atrial fibrillation (IRAF).

## 2022-09-28 NOTE — ANESTHESIA CARE TRANSFER NOTE
Patient: Rogers ADHIKARI Stone    Procedure: Procedure(s):  Ablation Pulmonary Vein Isolation       Diagnosis: AF  Diagnosis Additional Information: No value filed.    Anesthesia Type:   General     Note:    Oropharynx: oropharynx clear of all foreign objects and spontaneously breathing  Level of Consciousness: awake and drowsy  Oxygen Supplementation: face mask  Level of Supplemental Oxygen (L/min / FiO2): 6  Independent Airway: airway patency satisfactory and stable  Dentition: dentition unchanged  Vital Signs Stable: post-procedure vital signs reviewed and stable  Report to RN Given: handoff report given  Patient transferred to: Cardiac Special Care          Vitals:  Vitals Value Taken Time   /66 09/28/22 0902   Temp 36.8  C (98.2  F) 09/28/22 0902   Pulse 71 09/28/22 0902   Resp 16 09/28/22 0902   SpO2 100 % 09/28/22 0902       Electronically Signed By: FABBY Lawson CRNA  September 28, 2022  9:02 AM

## 2022-09-28 NOTE — Clinical Note
Prepped: groin. Prepped with: DuraPrep. The patient was draped. .Pre-procedure site marking:Insertion site not predetermined

## 2022-09-28 NOTE — Clinical Note
[employer] Med system 12 lead EKG, hemodynamics 5 lead, pulse oximetery, NIBP, Physiocontrol hands off defibrillator/external pacer, with 3 monitoring leads to patient. Baseline assessment done.

## 2022-09-28 NOTE — ANESTHESIA PREPROCEDURE EVALUATION
Anesthesia Pre-Procedure Evaluation    Patient: Rogers Martinez   MRN: [id number] : 1960        Procedure : Procedure(s):  Ablation Pulmonary Vein Isolation          Past Medical History:   Diagnosis Date     Paroxysmal atrial fibrillation (H)       Past Surgical History:   Procedure Laterality Date     APPENDECTOMY       BOWEL RESECTION      with appy, age 9     CARDIOVERSION      10/16/16, 17, 19     [place] EPHYS EVL TRNSPTL [place] ATRIAL FIB ISOLAT PULM VEIN Left 2017    Procedure: EP Ablation PVI  RF to 4 PV;  Surgeon: Celina Marquez MD;       Allergies   Allergen Reactions     Terbinafine Hives      Social History     Tobacco Use     Smoking status: Former Smoker     Types: Cigarettes, Cigars     Smokeless tobacco: Former User     Types: Chew   Substance Use Topics     Alcohol use: Yes     Comment: 12-15 drinks/week      Wt Readings from Last 1 Encounters:   22 100.6 kg (221 lb 11.2 oz)        Anesthesia Evaluation   Pt has had prior anesthetic. Type: General.    No history of anesthetic complications       ROS/MED HX  ENT/Pulmonary:  - neg pulmonary ROS   (+) tobacco use, Past use,  (-) sleep apnea   Neurologic:  - neg neurologic ROS     Cardiovascular: Comment: Stress echo done 2022:  Patient exercised 9:55 seconds on a Mannie protocol stopping secondary to  fatigue  Appropriate heart rate and blood pressure response to exercise.Patient  achieved 84% of maximum predicted heart rate  Exercise ECG is negative for ischemia at the workload achieved  Rest Echo:Definity utilized.Normal LV function.LVEF estimate at 60-65%  Post Exercise Echo:Appropriate augmentation of systolic function without  observed wall motion abnormality  Conclusions: Exercise stress echocardiogram is negative for ischemia at the  workload achieved.Maximum heart rate achieved was mildly reduced at 84%.Post  exercise images are mildly challenging.    (+) -----dysrhythmias (failed antiarrhythmic therapy s/f  PVI), a-fib,  (-) hypertension, CAD and dyslipidemia   METS/Exercise Tolerance: >4 METS    Hematologic: Comments: AC w/ Elequis - neg hematologic  ROS     Musculoskeletal:  - neg musculoskeletal ROS     GI/Hepatic:     (+) GERD, Asymptomatic on medication,     Renal/Genitourinary:  - neg Renal ROS     Endo:  - neg endo ROS     Psychiatric/Substance Use:  - neg psychiatric ROS     Infectious Disease:  - neg infectious disease ROS     Malignancy:  - neg malignancy ROS     Other:            Physical Exam    Airway        Mallampati: III   TM distance: > 3 FB   Neck ROM: full   Mouth opening: > 3 cm    Respiratory Devices and Support         Dental  no notable dental history         Cardiovascular   cardiovascular exam normal          Pulmonary   pulmonary exam normal                OUTSIDE LABS:  CBC:   Lab Results   Component Value Date    WBC 5.0 09/21/2022    WBC 6.2 04/08/2022    HGB 13.8 09/21/2022    HGB 15.0 04/08/2022    HCT 40.0 09/21/2022    HCT 43.9 04/08/2022     09/21/2022     04/08/2022     BMP:   Lab Results   Component Value Date     09/21/2022     07/11/2022    POTASSIUM 4.5 09/21/2022    POTASSIUM 4.7 07/11/2022    CHLORIDE 104 09/21/2022    CHLORIDE 104 07/11/2022    CO2 25 09/21/2022    CO2 23 07/11/2022    BUN 11.8 09/21/2022    BUN 9.1 07/11/2022    CR 0.93 09/21/2022    CR 0.89 07/11/2022    GLC 92 09/21/2022    GLC 96 07/11/2022     COAGS: No results found for: PTT, INR, FIBR  POC: No results found for: BGM, HCG, HCGS  HEPATIC:   Lab Results   Component Value Date    ALBUMIN 3.8 07/25/2018    PROTTOTAL 6.8 07/25/2018    ALT 24 07/25/2018    AST 21 07/25/2018    ALKPHOS 74 07/25/2018    BILITOTAL 3.7 (H) 07/25/2018     OTHER:   Lab Results   Component Value Date    LACT 1.7 07/24/2018    JOSE 9.2 09/21/2022    MAG 1.9 04/08/2022    LIPASE 49 07/24/2018    TSH 2.95 04/08/2022       Anesthesia Plan    ASA Status:  2   NPO Status:  NPO Appropriate    Anesthesia Type:  General.     - Airway: ETT   Induction: Intravenous.   Maintenance: Balanced.   Techniques and Equipment:     - Lines/Monitors: 2nd IV     - Drips/Meds: Phenylephrine     Consents            Postoperative Care    Pain management: IV analgesics, Multi-modal analgesia.   PONV prophylaxis: Ondansetron (or other 5HT-3), Dexamethasone or Solumedrol     Comments:    Other Comments: GETA  2 PIV, phenylephrine gtt  Decadron 10, Zofran 4            Felisha Britton MD

## 2022-09-30 ENCOUNTER — VIRTUAL VISIT (OUTPATIENT)
Dept: CARDIOLOGY | Facility: CLINIC | Age: 62
End: 2022-09-30
Payer: COMMERCIAL

## 2022-09-30 DIAGNOSIS — I48.0 PAROXYSMAL ATRIAL FIBRILLATION (H): Primary | ICD-10-CM

## 2022-09-30 PROCEDURE — 99207 PR NO CHARGE NURSE ONLY: CPT

## 2022-09-30 NOTE — PROGRESS NOTES
"Post PVI Procedural Follow Up Call    Pt is s/p PVI from 9/28/22 with Dr Giraldo  PC was placed to pt, spoke to pt    General Assessment:     Weight: Pt reports feeling \"a little puffy\" from being a bit more sedentary than his normal these last couple days, but denies any edema in his extremities or weight gain. Encouraged pt to start slowly increasing his activity, pt stated he was planning to take his dogs on a walk this afternoon    Pain: Pt denies generalized or localized pain abnormal to healing s/p     /GI: Pt denies difficulty swallowing, denies constipation, denies urinary retention/difficulty, reports no s/s of infection, report normal appetite and reports staying hydrated.    Respiratory: Pt denies SOB, denies difficulty breathing, denies throat pain, continues to use IS as instructed, denies changes/abnormal sputum and denies any further symptoms abnormal to normal healing process s/p PVI. Pt denies any heartburn and continues taking Protonix 40 mg daily.    Activity: Pt is tolerating advancement in activity while following physical restrictions, staying well hydrated and gradually working into baseline activity.     Rhythm Assessment:   Pt reports the 1st night he woke up out of sleep feeling like he starting to go into Afib but he had not taken his flecainide the evening prior so he took a 1/2 tab at that time and fell back alseep shortly after. He woke in the morning feeling well with no reports of Afib since that time. Pt since denies palpitations, denies irregularities in HR or rhythm and denies symptoms or sustained AF episodes.  Pt continues taking Flecainide 150 mg q 12 hours and Diltiazem 120 mg daily.    Procedure Site Assessment:   Pts some bruising around sites without significant change from hospital discharge and normal to PVI recovery    Anticoagulation/Medication:  Pt remain on Eliquis without interruption  Per guidelines by Dr Giraldo no ASA needed upon discharge    Education completed " with pt at this visit:  Reviewed normal post-op PVI healing process, when to contact EP-RN/EP-MD, contact information was given to the pt for further concerns or questions and pt verbalized understanding    Follow up  Pts AVS was printed and mailed to pt by scheduling team, pt will be seen by EP NP in 4-6 wks, monitor will be ordered at this follow-up if indicated and 3mo follow-up and monitor will be determined at 6wk follow-up by EP NP. Pt has follow-up with Sari Avelar on 11/9/22.    9/30/2022 10:14 AM  Jefferson Acosta RN

## 2022-09-30 NOTE — PATIENT INSTRUCTIONS
Your anticoagulation medication Eliquis:  It is important to remain on your anticoagulation medication uninterrupted after your ablation to reduce your risk of a stroke or heart attack, do not stop this medication  Please contact me if you have any questions regarding your anticoagulation medication    Healing from your pulmonary vein ablation:  Stay well hydrated, and increase your fluid intake during this recovery period  High protein foods aide in your bodies healing process  No aggressive or aerobic activity for 7-10 days, and do not lift more than 10 pounds for 7 days   Increase your activity gradually over the next 5-10 days, working back to your normal daily activity  If you are experiencing pain at your groin sites from the procedure, we advise applying ice for 20 minute durations 3-4 times per day     Please call me if any of the following occur:  Episodes of Atrial Fibrillation lasting greater than 4 hours, or if you notice the episodes are increasing in frequency or duration  If you develop shortness of breath, dizziness, or unresolving chest pains   Changes at your groin sites including swelling, hardening, drainage, increase in bruising, or an increase in pain  If you develop a temperature greater than 100.5 degrees (especially weeks 2-5 post   Procedure)    Call 911 if you are having symptoms of a stroke; difficulty with your speech, problems walking, difficulty with balance, vision disturbances, facial drooping or numbness, and muscle weakness on one side of your body     Your follow up appointments are as follows:  You will be seen by the electrophysiologist nurse practitioner at 6 weeks after your ablation. You have an appointment with Sari Avelar on 11/9/22 at the [facility]  At your 6 week appointment, it will be determined if a 3 month follow-up is needed    Sincerely,  Jefferson Acosta RN [phone]    After hours please contact the on call service at # [phone]

## 2022-10-15 ENCOUNTER — HEALTH MAINTENANCE LETTER (OUTPATIENT)
Age: 62
End: 2022-10-15

## 2022-10-21 DIAGNOSIS — I48.0 PAROXYSMAL ATRIAL FIBRILLATION (H): Primary | ICD-10-CM

## 2022-10-21 RX ORDER — FLECAINIDE ACETATE 150 MG/1
TABLET ORAL
Qty: 60 TABLET | Refills: 0 | Status: SHIPPED | OUTPATIENT
Start: 2022-10-21 | End: 2022-11-09

## 2022-11-07 ENCOUNTER — APPOINTMENT (OUTPATIENT)
Dept: CT IMAGING | Facility: HOSPITAL | Age: 62
End: 2022-11-07
Attending: EMERGENCY MEDICINE
Payer: COMMERCIAL

## 2022-11-07 ENCOUNTER — HOSPITAL ENCOUNTER (OUTPATIENT)
Facility: HOSPITAL | Age: 62
Setting detail: OBSERVATION
Discharge: HOME | End: 2022-11-08
Attending: EMERGENCY MEDICINE | Admitting: FAMILY MEDICINE
Payer: COMMERCIAL

## 2022-11-07 DIAGNOSIS — K56.609 SMALL BOWEL OBSTRUCTION (H): ICD-10-CM

## 2022-11-07 DIAGNOSIS — K52.9 ILEITIS: ICD-10-CM

## 2022-11-07 LAB
ALBUMIN SERPL BCG-MCNC: 4.6 G/DL (ref 3.5–5.2)
ALBUMIN UR-MCNC: NEGATIVE MG/DL
ALP SERPL-CCNC: 116 U/L (ref 40–129)
ALT SERPL W P-5'-P-CCNC: 31 U/L (ref 10–50)
ANION GAP SERPL CALCULATED.3IONS-SCNC: 10 MMOL/L (ref 7–15)
APPEARANCE UR: CLEAR
AST SERPL W P-5'-P-CCNC: 24 U/L (ref 10–50)
BASOPHILS # BLD AUTO: 0 10E3/UL (ref 0–0.2)
BASOPHILS NFR BLD AUTO: 0 %
BILIRUB SERPL-MCNC: 3.2 MG/DL
BILIRUB UR QL STRIP: NEGATIVE
BUN SERPL-MCNC: 11.5 MG/DL (ref 8–23)
CALCIUM SERPL-MCNC: 9 MG/DL (ref 8.8–10.2)
CHLORIDE SERPL-SCNC: 99 MMOL/L (ref 98–107)
COLOR UR AUTO: NORMAL
CREAT SERPL-MCNC: 1.01 MG/DL (ref 0.67–1.17)
CRP SERPL-MCNC: 5.4 MG/L
DEPRECATED HCO3 PLAS-SCNC: 27 MMOL/L (ref 22–29)
EOSINOPHIL # BLD AUTO: 0 10E3/UL (ref 0–0.7)
EOSINOPHIL NFR BLD AUTO: 0 %
ERYTHROCYTE [DISTWIDTH] IN BLOOD BY AUTOMATED COUNT: 13.1 % (ref 10–15)
ERYTHROCYTE [SEDIMENTATION RATE] IN BLOOD BY WESTERGREN METHOD: 6 MM/HR (ref 0–15)
GFR SERPL CREATININE-BSD FRML MDRD: 83 ML/MIN/1.73M2
GLUCOSE SERPL-MCNC: 112 MG/DL (ref 70–99)
GLUCOSE UR STRIP-MCNC: NEGATIVE MG/DL
HCT VFR BLD AUTO: 48.2 % (ref 40–53)
HGB BLD-MCNC: 16.2 G/DL (ref 13.3–17.7)
HGB UR QL STRIP: NEGATIVE
HOLD SPECIMEN: NORMAL
IMM GRANULOCYTES # BLD: 0 10E3/UL
IMM GRANULOCYTES NFR BLD: 0 %
KETONES UR STRIP-MCNC: NEGATIVE MG/DL
LACTATE SERPL-SCNC: 0.6 MMOL/L (ref 0.7–2)
LEUKOCYTE ESTERASE UR QL STRIP: NEGATIVE
LIPASE SERPL-CCNC: 27 U/L (ref 13–60)
LYMPHOCYTES # BLD AUTO: 0.9 10E3/UL (ref 0.8–5.3)
LYMPHOCYTES NFR BLD AUTO: 8 %
MCH RBC QN AUTO: 32.1 PG (ref 26.5–33)
MCHC RBC AUTO-ENTMCNC: 33.6 G/DL (ref 31.5–36.5)
MCV RBC AUTO: 95 FL (ref 78–100)
MONOCYTES # BLD AUTO: 0.6 10E3/UL (ref 0–1.3)
MONOCYTES NFR BLD AUTO: 6 %
NEUTROPHILS # BLD AUTO: 9.9 10E3/UL (ref 1.6–8.3)
NEUTROPHILS NFR BLD AUTO: 86 %
NITRATE UR QL: NEGATIVE
NRBC # BLD AUTO: 0 10E3/UL
NRBC BLD AUTO-RTO: 0 /100
PH UR STRIP: 6.5 [PH] (ref 5–7)
PLATELET # BLD AUTO: 287 10E3/UL (ref 150–450)
POTASSIUM SERPL-SCNC: 4.6 MMOL/L (ref 3.4–5.3)
PROT SERPL-MCNC: 7.6 G/DL (ref 6.4–8.3)
RBC # BLD AUTO: 5.05 10E6/UL (ref 4.4–5.9)
RBC URINE: 1 /HPF
SARS-COV-2 RNA RESP QL NAA+PROBE: NEGATIVE
SODIUM SERPL-SCNC: 136 MMOL/L (ref 136–145)
SP GR UR STRIP: 1.02 (ref 1–1.03)
UROBILINOGEN UR STRIP-MCNC: <2 MG/DL
WBC # BLD AUTO: 11.5 10E3/UL (ref 4–11)
WBC URINE: 1 /HPF

## 2022-11-07 PROCEDURE — 36415 COLL VENOUS BLD VENIPUNCTURE: CPT | Performed by: FAMILY MEDICINE

## 2022-11-07 PROCEDURE — 258N000003 HC RX IP 258 OP 636: Performed by: EMERGENCY MEDICINE

## 2022-11-07 PROCEDURE — 99285 EMERGENCY DEPT VISIT HI MDM: CPT | Mod: 25

## 2022-11-07 PROCEDURE — 81001 URINALYSIS AUTO W/SCOPE: CPT | Performed by: EMERGENCY MEDICINE

## 2022-11-07 PROCEDURE — 85025 COMPLETE CBC W/AUTO DIFF WBC: CPT | Performed by: EMERGENCY MEDICINE

## 2022-11-07 PROCEDURE — 80053 COMPREHEN METABOLIC PANEL: CPT | Performed by: EMERGENCY MEDICINE

## 2022-11-07 PROCEDURE — 250N000011 HC RX IP 250 OP 636: Performed by: EMERGENCY MEDICINE

## 2022-11-07 PROCEDURE — 250N000011 HC RX IP 250 OP 636: Performed by: FAMILY MEDICINE

## 2022-11-07 PROCEDURE — 85652 RBC SED RATE AUTOMATED: CPT | Performed by: FAMILY MEDICINE

## 2022-11-07 PROCEDURE — 86140 C-REACTIVE PROTEIN: CPT | Performed by: FAMILY MEDICINE

## 2022-11-07 PROCEDURE — C9803 HOPD COVID-19 SPEC COLLECT: HCPCS

## 2022-11-07 PROCEDURE — 96365 THER/PROPH/DIAG IV INF INIT: CPT

## 2022-11-07 PROCEDURE — 96361 HYDRATE IV INFUSION ADD-ON: CPT

## 2022-11-07 PROCEDURE — 74177 CT ABD & PELVIS W/CONTRAST: CPT

## 2022-11-07 PROCEDURE — 83690 ASSAY OF LIPASE: CPT | Performed by: EMERGENCY MEDICINE

## 2022-11-07 PROCEDURE — 36415 COLL VENOUS BLD VENIPUNCTURE: CPT | Performed by: EMERGENCY MEDICINE

## 2022-11-07 PROCEDURE — U0005 INFEC AGEN DETEC AMPLI PROBE: HCPCS | Performed by: EMERGENCY MEDICINE

## 2022-11-07 PROCEDURE — 83605 ASSAY OF LACTIC ACID: CPT | Performed by: EMERGENCY MEDICINE

## 2022-11-07 PROCEDURE — 87040 BLOOD CULTURE FOR BACTERIA: CPT | Performed by: EMERGENCY MEDICINE

## 2022-11-07 PROCEDURE — 96367 TX/PROPH/DG ADDL SEQ IV INF: CPT

## 2022-11-07 PROCEDURE — 250N000013 HC RX MED GY IP 250 OP 250 PS 637: Performed by: FAMILY MEDICINE

## 2022-11-07 PROCEDURE — 99219 PR INITIAL OBSERVATION CARE,LEVEL II: CPT | Performed by: FAMILY MEDICINE

## 2022-11-07 PROCEDURE — 96375 TX/PRO/DX INJ NEW DRUG ADDON: CPT

## 2022-11-07 PROCEDURE — G0378 HOSPITAL OBSERVATION PER HR: HCPCS

## 2022-11-07 RX ORDER — MORPHINE SULFATE 4 MG/ML
4 INJECTION, SOLUTION INTRAMUSCULAR; INTRAVENOUS ONCE
Status: COMPLETED | OUTPATIENT
Start: 2022-11-07 | End: 2022-11-07

## 2022-11-07 RX ORDER — PANTOPRAZOLE SODIUM 40 MG/1
40 TABLET, DELAYED RELEASE ORAL DAILY
Status: DISCONTINUED | OUTPATIENT
Start: 2022-11-07 | End: 2022-11-08 | Stop reason: HOSPADM

## 2022-11-07 RX ORDER — ONDANSETRON 4 MG/1
4 TABLET, ORALLY DISINTEGRATING ORAL EVERY 6 HOURS PRN
Status: DISCONTINUED | OUTPATIENT
Start: 2022-11-07 | End: 2022-11-08 | Stop reason: HOSPADM

## 2022-11-07 RX ORDER — ACETAMINOPHEN 325 MG/1
650 TABLET ORAL EVERY 6 HOURS PRN
Status: DISCONTINUED | OUTPATIENT
Start: 2022-11-07 | End: 2022-11-08 | Stop reason: HOSPADM

## 2022-11-07 RX ORDER — IOPAMIDOL 755 MG/ML
100 INJECTION, SOLUTION INTRAVASCULAR ONCE
Status: COMPLETED | OUTPATIENT
Start: 2022-11-07 | End: 2022-11-07

## 2022-11-07 RX ORDER — DILTIAZEM HYDROCHLORIDE 120 MG/1
120 CAPSULE, EXTENDED RELEASE ORAL DAILY
Status: DISCONTINUED | OUTPATIENT
Start: 2022-11-07 | End: 2022-11-08 | Stop reason: HOSPADM

## 2022-11-07 RX ORDER — ONDANSETRON 2 MG/ML
4 INJECTION INTRAMUSCULAR; INTRAVENOUS EVERY 6 HOURS PRN
Status: DISCONTINUED | OUTPATIENT
Start: 2022-11-07 | End: 2022-11-08 | Stop reason: HOSPADM

## 2022-11-07 RX ORDER — ONDANSETRON 2 MG/ML
4 INJECTION INTRAMUSCULAR; INTRAVENOUS ONCE
Status: COMPLETED | OUTPATIENT
Start: 2022-11-07 | End: 2022-11-07

## 2022-11-07 RX ORDER — SODIUM CHLORIDE AND POTASSIUM CHLORIDE 150; 900 MG/100ML; MG/100ML
INJECTION, SOLUTION INTRAVENOUS CONTINUOUS
Status: DISCONTINUED | OUTPATIENT
Start: 2022-11-07 | End: 2022-11-08

## 2022-11-07 RX ORDER — HYDROMORPHONE HYDROCHLORIDE 1 MG/ML
0.2 INJECTION, SOLUTION INTRAMUSCULAR; INTRAVENOUS; SUBCUTANEOUS
Status: DISCONTINUED | OUTPATIENT
Start: 2022-11-07 | End: 2022-11-08 | Stop reason: HOSPADM

## 2022-11-07 RX ORDER — METRONIDAZOLE 500 MG/100ML
500 INJECTION, SOLUTION INTRAVENOUS ONCE
Status: COMPLETED | OUTPATIENT
Start: 2022-11-07 | End: 2022-11-07

## 2022-11-07 RX ORDER — CIPROFLOXACIN 2 MG/ML
400 INJECTION, SOLUTION INTRAVENOUS ONCE
Status: COMPLETED | OUTPATIENT
Start: 2022-11-07 | End: 2022-11-07

## 2022-11-07 RX ORDER — ACETAMINOPHEN 650 MG/1
650 SUPPOSITORY RECTAL EVERY 6 HOURS PRN
Status: DISCONTINUED | OUTPATIENT
Start: 2022-11-07 | End: 2022-11-08 | Stop reason: HOSPADM

## 2022-11-07 RX ORDER — FLECAINIDE ACETATE 50 MG/1
150 TABLET ORAL 2 TIMES DAILY
Status: DISCONTINUED | OUTPATIENT
Start: 2022-11-07 | End: 2022-11-08 | Stop reason: HOSPADM

## 2022-11-07 RX ADMIN — CIPROFLOXACIN 400 MG: 2 INJECTION, SOLUTION INTRAVENOUS at 12:04

## 2022-11-07 RX ADMIN — METRONIDAZOLE 500 MG: 500 INJECTION, SOLUTION INTRAVENOUS at 13:19

## 2022-11-07 RX ADMIN — PANTOPRAZOLE SODIUM 40 MG: 40 TABLET, DELAYED RELEASE ORAL at 15:31

## 2022-11-07 RX ADMIN — DILTIAZEM HYDROCHLORIDE 120 MG: 120 CAPSULE, EXTENDED RELEASE ORAL at 15:31

## 2022-11-07 RX ADMIN — SODIUM CHLORIDE 1000 ML: 9 INJECTION, SOLUTION INTRAVENOUS at 09:24

## 2022-11-07 RX ADMIN — MORPHINE SULFATE 4 MG: 4 INJECTION INTRAVENOUS at 09:52

## 2022-11-07 RX ADMIN — POTASSIUM CHLORIDE AND SODIUM CHLORIDE: 900; 150 INJECTION, SOLUTION INTRAVENOUS at 15:31

## 2022-11-07 RX ADMIN — FLECAINIDE ACETATE 150 MG: 50 TABLET ORAL at 20:10

## 2022-11-07 RX ADMIN — ONDANSETRON 4 MG: 2 INJECTION INTRAMUSCULAR; INTRAVENOUS at 09:50

## 2022-11-07 RX ADMIN — APIXABAN 5 MG: 5 TABLET, FILM COATED ORAL at 20:10

## 2022-11-07 RX ADMIN — IOPAMIDOL 100 ML: 755 INJECTION, SOLUTION INTRAVENOUS at 10:23

## 2022-11-07 ASSESSMENT — ACTIVITIES OF DAILY LIVING (ADL)
ADLS_ACUITY_SCORE: 35
ADLS_ACUITY_SCORE: 31
DEPENDENT_IADLS:: INDEPENDENT
ADLS_ACUITY_SCORE: 31
ADLS_ACUITY_SCORE: 35

## 2022-11-07 NOTE — PHARMACY-ADMISSION MEDICATION HISTORY
Pharmacy Note - Admission Medication History    Pertinent Provider Information: Pt only took Eliquis this morning due to nausea.   ______________________________________________________________________    Prior To Admission (PTA) med list completed and updated in EMR.       PTA Med List   Medication Sig Last Dose     apixaban ANTICOAGULANT (ELIQUIS) 5 MG tablet Take 1 tablet (5 mg) by mouth 2 times daily 11/7/2022 at x1     cholecalciferol, vitamin D3, 5,000 unit Tab [CHOLECALCIFEROL, VITAMIN D3, 5,000 UNIT TAB] Take 2,000 Units by mouth daily.        11/6/2022     diltiazem ER (TIAZAC) 120 MG 24 hr ER beaded capsule Take 1 capsule (120 mg) by mouth daily 11/6/2022     flecainide (TAMBOCOR) 150 MG tablet TAKE ONE TABLET BY MOUTH TWICE DAILY 11/6/2022     pantoprazole (PROTONIX) 40 MG EC tablet Take 1 tablet (40 mg) by mouth daily Continue for 6 weeks after ablation 11/6/2022       Information source(s): Patient and CareEverywhere/SureScripts  Method of interview communication: in-person    Summary of Changes to PTA Med List  New: none  Discontinued: none  Changed: none    Patient was asked about OTC/herbal products specifically.  PTA med list reflects this.    In the past week, patient estimated taking medication this percent of the time:  greater than 90%.    Allergies were reviewed, assessed, and updated with the patient.      Patient does not use any multi-dose medications prior to admission.    The information provided in this note is only as accurate as the sources available at the time of the update(s).    Thank you for the opportunity to participate in the care of this patient.    Emilee Ko, PharmD  11/7/2022 9:21 AM

## 2022-11-07 NOTE — CONSULTS
Care Management Initial Consult    General Information  Assessment completed with: Patient,    Type of CM/SW Visit: Initial Assessment    Primary Care Provider verified and updated as needed: Yes   Readmission within the last 30 days: no previous admission in last 30 days      Reason for Consult: discharge planning  Advance Care Planning: Advance Care Planning Reviewed: no concerns identified          Communication Assessment  Patient's communication style: spoken language (English or Bilingual)             Cognitive  Cognitive/Neuro/Behavioral:                        Living Environment:   People in home: spouse  Spouse Marci  Current living Arrangements: house      Able to return to prior arrangements: yes       Family/Social Support:  Care provided by: self  Provides care for: no one  Marital Status:   Wife, Children  Marci       Description of Support System: Supportive, Involved    Support Assessment: Adequate family and caregiver support, Adequate social supports, Patient communicates needs well met    Current Resources:   Patient receiving home care services: No     Community Resources: None  Equipment currently used at home: none  Supplies currently used at home: None    Employment/Financial:  Employment Status: employed part-time        Financial Concerns:             Lifestyle & Psychosocial Needs:  Social Determinants of Health     Tobacco Use: Medium Risk     Smoking Tobacco Use: Former     Smokeless Tobacco Use: Former     Passive Exposure: Not on file   Alcohol Use: Not on file   Financial Resource Strain: Not on file   Food Insecurity: Not on file   Transportation Needs: Not on file   Physical Activity: Not on file   Stress: Not on file   Social Connections: Not on file   Intimate Partner Violence: Not on file   Depression: Not on file   Housing Stability: Not on file       Functional Status:  Prior to admission patient needed assistance:   Dependent ADLs:: Independent  Dependent IADLs::  Independent       Mental Health Status:          Chemical Dependency Status:                Values/Beliefs:  Spiritual, Cultural Beliefs, Jain Practices, Values that affect care:                 Additional Information:  Lives w/spouse in their home. Independent, no services. No CM needs identified. Family to transport at discharge.    Shana Hardy RN

## 2022-11-07 NOTE — ED NOTES
Met with and rounded on patient. Rates central crampy abd pain 3/10 and refuses nausea currently; refuses morphine and ondansetron and provider notified. IV placed and IVF infusing. Bowel sounds are hypoactive and abd is soft but tender to touch near the umbilicus. Denies chest pain, shortness of breath or any other symptoms. States his diarrhea and emesis have both been non-bloody, denies tarry stools or coffee ground emesis.

## 2022-11-07 NOTE — ED TRIAGE NOTES
Patient presents here for evaluation of abdominal pain. 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 His current pain began on 11/5. He locates the pain to just below the umbilicus with downward radiation. He notes that. Initially, he had diarrhea stools. He has been vomiting.

## 2022-11-07 NOTE — ED PROVIDER NOTES
EMERGENCY DEPARTMENT ENCOUNTER      NAME: Rogers Martinez  AGE: 62 year old male  YOB: 1960  MRN: [id number]  EVALUATION DATE & TIME: 11/7/2022  9:05 AM    PCP: Ahsan Andrew    ED PROVIDER: Yesy Felix M.D.      Chief Complaint   Patient presents with     Abdominal Pain         FINAL IMPRESSION:  1. Ileitis    2. Small bowel obstruction (H)          ED COURSE & MEDICAL DECISION MAKING:    ED Course as of 11/07/22 1214   Mon Nov 07, 2022   0914 Patient with abdominal pain with N/V and loose stool similar to prior SBO episodes, amenabl eot CT abdomen, morphine and zofran, screening labs underway   1113 Reassuirngly UA without cells to suggest UTI and COVID19 negative, CMP WNL and CBC WNL also.   1113 CT with ileitis with mild obstruction, abx begun with stool cultures, will d/w charge RN admission bed options   1126 Charge RN Belen notes we should admit here for ileitis with associated small SBO recurrent, cutlures and C diff pending, patient updated, him and wife ok with plan, hosptialist here paged for admission.   1209 Pt endorsed to hospitalist Dr Monroe to med surg obs       Pertinent Labs & Imaging studies reviewed. (See chart for details)    N95 worn  A face shield was worn also  COVID PPE      At the conclusion of the encounter I discussed the results of all of the tests and the disposition. The questions were answered. The patient or family acknowledged understanding and was agreeable with the care plan.     MEDICATIONS GIVEN IN THE EMERGENCY:  Medications   ciprofloxacin (CIPRO) infusion 400 mg (400 mg Intravenous New Bag 11/7/22 1204)   metroNIDAZOLE (FLAGYL) infusion 500 mg (has no administration in time range)   morphine (PF) injection 4 mg (4 mg Intravenous Given 11/7/22 0952)   ondansetron (ZOFRAN) injection 4 mg (4 mg Intravenous Given 11/7/22 0950)   0.9% sodium chloride BOLUS (0 mLs Intravenous Stopped 11/7/22 0957)   iopamidol (ISOVUE-370) solution 100 mL (100 mLs  Intravenous Given 11/7/22 1023)       NEW PRESCRIPTIONS STARTED AT TODAY'S ER VISIT  New Prescriptions    No medications on file          =================================================================    HPI      Rogers Martinez is a 62 year old male with PMHx of recurrent SBO and appendectomy who presents to the ED today via walk in with spouse with abdominal pain.     Per chart review, patient presented to [facility] on 09/28/2022 for evaluation of paroxymal atrial fibrillation. A redo atrial fibrillation procedure was performed. Patient also had increased groin pain with blood or fluid draining from the groin site. The area was punctured. Discharge medications included flecainide 150mg twice daily and diltiazem CD 120mg daily for now and to continue taking blood thinner apixaban (Eliquis).    Patient presents with abdominal pain that radiates to his RLQ as well as diarrhea, nausea, and vomiting. The abdominal pain started on 11/05/2022. The patient initially reports having solid feces on 11/05, but this transformed to diarrhea soon after. The patient first experienced nausea and vomiting at 0300 this morning. Patient also reports having a decreased appetite and abdominal distention. Currently, he rates his pain as a 3-4/10. He denies taking nausea medications. He is not allergic to nausea medications. He reports his current symptoms are similar to his past SBO's. He reports a history of atrial fibrillation. Patient denies cough, shortness of breath, painful urination, and blood in urine.     Patient reports he has been having SBO's for approximately 30 years ago. He has undergone an appendectomy. Patient does not report of any other medical concerns or complaints at this time.      REVIEW OF SYSTEMS   All other systems reviewed and are negative except as noted above in HPI.    PAST MEDICAL HISTORY:  Past Medical History:   Diagnosis Date     Paroxysmal atrial fibrillation (H)        PAST SURGICAL  HISTORY:  Past Surgical History:   Procedure Laterality Date     APPENDECTOMY       BOWEL RESECTION      with appy, age 9     CARDIOVERSION      10/16/16, 4/16/17, 8/16/19     RI EPHYS EVL TRNSPTL TX ATRIAL FIB ISOLAT PULM VEIN Left 7/12/2017    Procedure: EP Ablation PVI  RF to 4 PV;  Surgeon: Celina Marquez MD;        CURRENT MEDICATIONS:    apixaban ANTICOAGULANT (ELIQUIS) 5 MG tablet  cholecalciferol, vitamin D3, 5,000 unit Tab  diltiazem ER (TIAZAC) 120 MG 24 hr ER beaded capsule  flecainide (TAMBOCOR) 150 MG tablet  pantoprazole (PROTONIX) 40 MG EC tablet        ALLERGIES:  Allergies   Allergen Reactions     Terbinafine Hives       FAMILY HISTORY:  Family History   Problem Relation Age of Onset     Atrial fibrillation Mother      Cerebrovascular Disease Mother      Aortic aneurysm Father      Atrial fibrillation Sister      Atrial fibrillation Brother        SOCIAL HISTORY:   Social History     Socioeconomic History     Marital status:    Tobacco Use     Smoking status: Former     Types: Cigarettes, Cigars     Smokeless tobacco: Former     Types: Chew   Substance and Sexual Activity     Alcohol use: Yes     Comment: 12-15 drinks/week     Drug use: No     Sexual activity: Yes       VITALS:  Patient Vitals for the past 24 hrs:   BP Temp Temp src Pulse Resp SpO2 Weight   11/07/22 1130 127/74 -- -- 60 -- 97 % --   11/07/22 1045 121/68 -- -- 60 -- 97 % --   11/07/22 1000 126/76 -- -- 61 -- 99 % --   11/07/22 0902 (!) 144/81 98.6  F (37  C) Oral 68 16 99 % 99.8 kg (220 lb)       PHYSICAL EXAM    GENERAL: Awake, alert.  In no acute distress.   HEENT: Normocephalic, atraumatic.  Pupils equal, round and reactive.  Conjunctiva normal.  EOMI.  NECK: No stridor or apparent deformity.  PULMONARY: Symmetrical breath sounds without distress.  Lungs clear to auscultation bilaterally without wheezes, rhonchi or rales.  CARDIO: Regular rate and rhythm.  No significant murmur, rub or gallop.  Radial pulses  strong and symmetrical.  ABDOMINAL: Abdomen soft, non-distended and non-tender to palpation.  No CVAT, no palpable hepatosplenomegaly. No reproducible tenderness.  EXTREMITIES: No lower extremity swelling or edema.    NEURO: Alert and oriented to person, place and time.  Cranial nerves grossly intact.  No focal motor deficit.  PSYCH: Normal mood and affect  SKIN: No rashes      LAB:  All pertinent labs reviewed and interpreted.  Results for orders placed or performed during the hospital encounter of 11/07/22   CT Abdomen Pelvis w Contrast    Impression    IMPRESSION:   1.  Infectious or inflammatory ileitis and colitis. Crohn disease should be considered.  2.  Dilated small bowel loops with air-fluid levels represent a mild obstruction related to the ileitis.   Extra Red Top Tube   Result Value Ref Range    Hold Specimen JIC    Extra Green Top (Lithium Heparin) Tube   Result Value Ref Range    Hold Specimen JIC    Extra Purple Top Tube   Result Value Ref Range    Hold Specimen JIC    Extra Blood Bank Purple Top Tube   Result Value Ref Range    Hold Specimen JIC    UA with Microscopic reflex to Culture    Specimen: Urine, Midstream   Result Value Ref Range    Color Urine Light Yellow Colorless, Straw, Light Yellow, Yellow    Appearance Urine Clear Clear    Glucose Urine Negative Negative mg/dL    Bilirubin Urine Negative Negative    Ketones Urine Negative Negative mg/dL    Specific Gravity Urine 1.017 1.001 - 1.030    Blood Urine Negative Negative    pH Urine 6.5 5.0 - 7.0    Protein Albumin Urine Negative Negative mg/dL    Urobilinogen Urine <2.0 <2.0 mg/dL    Nitrite Urine Negative Negative    Leukocyte Esterase Urine Negative Negative    RBC Urine 1 <=2 /HPF    WBC Urine 1 <=5 /HPF   Asymptomatic COVID-19 Virus (Coronavirus) by PCR Nasopharyngeal    Specimen: Nasopharyngeal; Swab   Result Value Ref Range    SARS CoV2 PCR Negative Negative   Comprehensive metabolic panel   Result Value Ref Range    Sodium 136 136 -  145 mmol/L    Potassium 4.6 3.4 - 5.3 mmol/L    Chloride 99 98 - 107 mmol/L    Carbon Dioxide (CO2) 27 22 - 29 mmol/L    Anion Gap 10 7 - 15 mmol/L    Urea Nitrogen 11.5 8.0 - 23.0 mg/dL    Creatinine 1.01 0.67 - 1.17 mg/dL    Calcium 9.0 8.8 - 10.2 mg/dL    Glucose 112 (H) 70 - 99 mg/dL    Alkaline Phosphatase 116 40 - 129 U/L    AST 24 10 - 50 U/L    ALT 31 10 - 50 U/L    Protein Total 7.6 6.4 - 8.3 g/dL    Albumin 4.6 3.5 - 5.2 g/dL    Bilirubin Total 3.2 (H) <=1.2 mg/dL    GFR Estimate 83 >60 mL/min/1.73m2   Result Value Ref Range    Lipase 27 13 - 60 U/L   CBC with platelets and differential   Result Value Ref Range    WBC Count 11.5 (H) 4.0 - 11.0 10e3/uL    RBC Count 5.05 4.40 - 5.90 10e6/uL    Hemoglobin 16.2 13.3 - 17.7 g/dL    Hematocrit 48.2 40.0 - 53.0 %    MCV 95 78 - 100 fL    MCH 32.1 26.5 - 33.0 pg    MCHC 33.6 31.5 - 36.5 g/dL    RDW 13.1 10.0 - 15.0 %    Platelet Count 287 150 - 450 10e3/uL    % Neutrophils 86 %    % Lymphocytes 8 %    % Monocytes 6 %    % Eosinophils 0 %    % Basophils 0 %    % Immature Granulocytes 0 %    NRBCs per 100 WBC 0 <1 /100    Absolute Neutrophils 9.9 (H) 1.6 - 8.3 10e3/uL    Absolute Lymphocytes 0.9 0.8 - 5.3 10e3/uL    Absolute Monocytes 0.6 0.0 - 1.3 10e3/uL    Absolute Eosinophils 0.0 0.0 - 0.7 10e3/uL    Absolute Basophils 0.0 0.0 - 0.2 10e3/uL    Absolute Immature Granulocytes 0.0 <=0.4 10e3/uL    Absolute NRBCs 0.0 10e3/uL       RADIOLOGY:  Reviewed all pertinent imaging. Please see official radiology report.  CT Abdomen Pelvis w Contrast   Final Result   IMPRESSION:    1.  Infectious or inflammatory ileitis and colitis. Crohn disease should be considered.   2.  Dilated small bowel loops with air-fluid levels represent a mild obstruction related to the ileitis.          I, Francesco Parmar, am serving as a scribe to document services personally performed by Dr. Yesy Felix based on my observation and the provider's statements to me. I, Yesy Felix MD attest  that Francesco Parmar is acting in a scribe capacity, has observed my performance of the services and has documented them in accordance with my direction.     Yesy Felix MD  11/07/22 1517

## 2022-11-07 NOTE — H&P
[facility]    History and Physical - Hospitalist Service       Date of Admission:  11/7/2022    Assessment & Plan      Rogers Martinez is a 62 year old male with history of previous recurrent partial small bowel obstructions admitted to the hospital for further work-up and evaluation of ileitis    Ileitis/colitis  --- Previous CTs reviewed.  CT suggest infectious or inflammatory etiology with consideration for Crohn's disease.  No Previous history of ischemic, infectious, or inflammatory disease.  Is on AC for Afib with recent ablation  --- Next colonoscopy 2022  --- Admit, continue IV fluids  --- Clear liquid diet  --- Stool studies ordered.  --- Quested lactic acid level and will order inflammatory markers  --- GI consult  --- Given Cipro Flag emergency room department but will hold off on further antibiotics pending GI evaluation.  ---bc obtained    Paroxysmal atrial fibrillation  --- Status post recent ablation.  Had previous ablation 2017 as well  --- Continue home diltiazem, flecainide, apixaban  --- Continue home Protonix, on for 6 weeks after ablation.         Diet:   clear liquids  DVT Prophylaxis: DOAC  Sinha Catheter: Not present  Central Lines: None  Code Status:  full code    Clinically Significant Risk Factors Present on Admission               # Drug Induced Coagulation Defect: home medication list includes an anticoagulant medication                Disposition Plan   Anticipate discharge in 1 day  The patient's care was discussed with the Patient.    Rika Monroe MD  [facility]  Securely message with the Vocera Web Console (learn more here)  Text page via [employer] Paging/Directory      ______________________________________________________________________    Chief Complaint   Abdominal pain, nausea vomiting and diarrhea    History is obtained from the patient    History of Present Illness   Rogers Martinez is a 62 year old male with a history of episodic  recurrent small bowel obstructions over the past 30 years who came into the emergency room department for further work-up and evaluation of a 2-day history of right lower quadrant abdominal cramping in the setting of vomiting and diarrhea.  Patient's primary historian.  He states that 2 days ago he began having some cramping pain.  Slightly different and in some ways less severe than previous episodes of bowel obstruction.  He was having hard stools but this morning around 5:00 had a diarrhea stool.  He also vomited this morning at 3:00.  Due to increase in pain he came to the emergency room department.  In the ER CT scan concerning for ileitis and colitis along with ileus and he is being admitted.    Previous bowel obstructions or felt secondary to past surgery including appendicitis.  Patient has had regular every 5-year colonoscopies and thinks he last had a colonoscopy in summer 2022.  He denies melena or hematochezia.  He denies previous history of inflammatory bowel disease.  He has no history of coronary artery disease.  He does not smoke.  No recent antibiotic exposure.  Pain is under control with IV pain medication.    Has history of atrial fibrillation.  Recent cardiac ablation 9/28/2022    Review of Systems    The 10 point Review of Systems is negative other than noted in the HPI.    Past Medical History    I have reviewed this patient's medical history and updated it with pertinent information if needed.   Past Medical History:   Diagnosis Date     Paroxysmal atrial fibrillation (H)        Past Surgical History   I have reviewed this patient's surgical history and updated it with pertinent information if needed.  Past Surgical History:   Procedure Laterality Date     APPENDECTOMY       BOWEL RESECTION      with appy, age 9     CARDIOVERSION      10/16/16, 4/16/17, 8/16/19     OR EPHYS EVL TRNSPTL TX ATRIAL FIB ISOLAT PULM VEIN Left 7/12/2017    Procedure: EP Ablation PVI  RF to 4 PV;  Surgeon: Celina  Judson Marquez MD;        Social History   I have reviewed this patient's social history and updated it with pertinent information if needed.  Social History     Tobacco Use     Smoking status: Former     Types: Cigarettes, Cigars     Smokeless tobacco: Former     Types: Chew   Substance Use Topics     Alcohol use: Yes     Comment: 12-15 drinks/week     Drug use: No       Family History   I have reviewed this patient's family history and updated it with pertinent information if needed.  Family History   Problem Relation Age of Onset     Atrial fibrillation Mother      Cerebrovascular Disease Mother      Aortic aneurysm Father      Atrial fibrillation Sister      Atrial fibrillation Brother        Prior to Admission Medications   Prior to Admission Medications   Prescriptions Last Dose Informant Patient Reported? Taking?   apixaban ANTICOAGULANT (ELIQUIS) 5 MG tablet 11/7/2022 at x1  No Yes   Sig: Take 1 tablet (5 mg) by mouth 2 times daily   cholecalciferol, vitamin D3, 5,000 unit Tab 11/6/2022  Yes Yes   Sig: [CHOLECALCIFEROL, VITAMIN D3, 5,000 UNIT TAB] Take 2,000 Units by mouth daily.          diltiazem ER (TIAZAC) 120 MG 24 hr ER beaded capsule 11/6/2022  Yes Yes   Sig: Take 1 capsule (120 mg) by mouth daily   flecainide (TAMBOCOR) 150 MG tablet 11/6/2022  No Yes   Sig: TAKE ONE TABLET BY MOUTH TWICE DAILY   pantoprazole (PROTONIX) 40 MG EC tablet 11/6/2022  No Yes   Sig: Take 1 tablet (40 mg) by mouth daily Continue for 6 weeks after ablation      Facility-Administered Medications: None     Allergies   Allergies   Allergen Reactions     Terbinafine Hives       Physical Exam   Vital Signs: Temp: 98.6  F (37  C) Temp src: Oral BP: 118/69 Pulse: 59   Resp: 16 SpO2: 97 %      Weight: 220 lbs 0 oz    General Appearance: Pleasant nontoxic, no apparent distress  Eyes: Sclera nonicteric and extraocular muscles intact  HEENT: Oropharynx moist with tongue midline  Respiratory: Clear to auscultation bilaterally  without wheezes crackles or rhonchi  Cardiovascular: Regular rate and rhythm without murmurs rubs or gallop  GI: Thin, mild right lower quadrant tenderness without rebound or guarding or masses noted.  Skin: No bruising noted no open areas.  No significant lower extremity edema  Musculoskeletal: No lower extremity edema  Neurologic: Neurologically gross intact without focal deficits appreciated.    Data   Data reviewed today: I reviewed all medications, new labs and imaging results over the last 24 hours.     CT Abdomen Pelvis w Contrast   Final Result   IMPRESSION:    1.  Infectious or inflammatory ileitis and colitis. Crohn disease should be considered.   2.  Dilated small bowel loops with air-fluid levels represent a mild obstruction related to the ileitis.          Recent Results (from the past 24 hour(s))   Extra Red Top Tube    Collection Time: 11/07/22  9:17 AM   Result Value Ref Range    Hold Specimen JIC    Extra Green Top (Lithium Heparin) Tube    Collection Time: 11/07/22  9:17 AM   Result Value Ref Range    Hold Specimen JIC    Extra Purple Top Tube    Collection Time: 11/07/22  9:17 AM   Result Value Ref Range    Hold Specimen JI    Extra Blood Bank Purple Top Tube    Collection Time: 11/07/22  9:17 AM   Result Value Ref Range    Hold Specimen JIC    Comprehensive metabolic panel    Collection Time: 11/07/22  9:17 AM   Result Value Ref Range    Sodium 136 136 - 145 mmol/L    Potassium 4.6 3.4 - 5.3 mmol/L    Chloride 99 98 - 107 mmol/L    Carbon Dioxide (CO2) 27 22 - 29 mmol/L    Anion Gap 10 7 - 15 mmol/L    Urea Nitrogen 11.5 8.0 - 23.0 mg/dL    Creatinine 1.01 0.67 - 1.17 mg/dL    Calcium 9.0 8.8 - 10.2 mg/dL    Glucose 112 (H) 70 - 99 mg/dL    Alkaline Phosphatase 116 40 - 129 U/L    AST 24 10 - 50 U/L    ALT 31 10 - 50 U/L    Protein Total 7.6 6.4 - 8.3 g/dL    Albumin 4.6 3.5 - 5.2 g/dL    Bilirubin Total 3.2 (H) <=1.2 mg/dL    GFR Estimate 83 >60 mL/min/1.73m2   Lipase    Collection Time:  11/07/22  9:17 AM   Result Value Ref Range    Lipase 27 13 - 60 U/L   CBC with platelets and differential    Collection Time: 11/07/22  9:17 AM   Result Value Ref Range    WBC Count 11.5 (H) 4.0 - 11.0 10e3/uL    RBC Count 5.05 4.40 - 5.90 10e6/uL    Hemoglobin 16.2 13.3 - 17.7 g/dL    Hematocrit 48.2 40.0 - 53.0 %    MCV 95 78 - 100 fL    MCH 32.1 26.5 - 33.0 pg    MCHC 33.6 31.5 - 36.5 g/dL    RDW 13.1 10.0 - 15.0 %    Platelet Count 287 150 - 450 10e3/uL    % Neutrophils 86 %    % Lymphocytes 8 %    % Monocytes 6 %    % Eosinophils 0 %    % Basophils 0 %    % Immature Granulocytes 0 %    NRBCs per 100 WBC 0 <1 /100    Absolute Neutrophils 9.9 (H) 1.6 - 8.3 10e3/uL    Absolute Lymphocytes 0.9 0.8 - 5.3 10e3/uL    Absolute Monocytes 0.6 0.0 - 1.3 10e3/uL    Absolute Eosinophils 0.0 0.0 - 0.7 10e3/uL    Absolute Basophils 0.0 0.0 - 0.2 10e3/uL    Absolute Immature Granulocytes 0.0 <=0.4 10e3/uL    Absolute NRBCs 0.0 10e3/uL   Asymptomatic COVID-19 Virus (Coronavirus) by PCR Nasopharyngeal    Collection Time: 11/07/22  9:46 AM    Specimen: Nasopharyngeal; Swab   Result Value Ref Range    SARS CoV2 PCR Negative Negative   UA with Microscopic reflex to Culture    Collection Time: 11/07/22 10:29 AM    Specimen: Urine, Midstream   Result Value Ref Range    Color Urine Light Yellow Colorless, Straw, Light Yellow, Yellow    Appearance Urine Clear Clear    Glucose Urine Negative Negative mg/dL    Bilirubin Urine Negative Negative    Ketones Urine Negative Negative mg/dL    Specific Gravity Urine 1.017 1.001 - 1.030    Blood Urine Negative Negative    pH Urine 6.5 5.0 - 7.0    Protein Albumin Urine Negative Negative mg/dL    Urobilinogen Urine <2.0 <2.0 mg/dL    Nitrite Urine Negative Negative    Leukocyte Esterase Urine Negative Negative    RBC Urine 1 <=2 /HPF    WBC Urine 1 <=5 /HPF

## 2022-11-07 NOTE — ED NOTES
[facility] ED Handoff Report    ED Chief Complaint: Abdominal pain    ED Diagnosis:  (K52.9) Ileitis  (K56.609) Small bowel obstruction (H)         PMH:    Past Medical History:   Diagnosis Date    Paroxysmal atrial fibrillation (H)         Code Status:  Full Code     Falls Risk: No Band: Not applicable    Current Living Situation/Residence: lives with a significant other and lives in a house     Elimination Status: Continent: Yes     Activity Level: Independent    Patients Preferred Language:  English     Needed: No    Vital Signs:  /73   Pulse 58   Temp 98.6  F (37  C) (Oral)   Resp 16   Wt 99.8 kg (220 lb)   SpO2 96%   BMI 27.50 kg/m         Pain Score: 0/10    Is the Patient Confused:  No    Last Food or Drink: 11/07/22 at sips of water    Focused Assessment:  Abdominal RLQ and mid abdomen cramping pain. Ileitis and SBO. Pt alert and appropriately verbalized needs. Denies pain at this time. Independent with ambulation and bed mobility.     Tests Performed: Done: Labs and Imaging    Treatments Provided:  rest/pain management/fluids    Family Dynamics/Concerns: No    Family Updated On Visitor Policy: Yes    Plan of Care Communicated to Family: Yes    Who Was Updated about Plan of Care: spouse at bedside    Belongings Checklist Done and Signed by Patient: Yes    Medications sent with patient: IV K+ Na 100 ml/hr.     Covid: asymptomatic , negative    Additional Information: Still needs stool sample labs    RN: Luciana Carmichael RN 11/7/2022 4:49 PM

## 2022-11-08 VITALS
OXYGEN SATURATION: 95 % | TEMPERATURE: 98.3 F | HEIGHT: 75 IN | BODY MASS INDEX: 26.98 KG/M2 | WEIGHT: 217 LBS | RESPIRATION RATE: 18 BRPM | HEART RATE: 51 BPM | SYSTOLIC BLOOD PRESSURE: 127 MMHG | DIASTOLIC BLOOD PRESSURE: 70 MMHG

## 2022-11-08 LAB
ALBUMIN SERPL BCG-MCNC: 3.8 G/DL (ref 3.5–5.2)
ALP SERPL-CCNC: 83 U/L (ref 40–129)
ALT SERPL W P-5'-P-CCNC: 23 U/L (ref 10–50)
ANION GAP SERPL CALCULATED.3IONS-SCNC: 6 MMOL/L (ref 7–15)
AST SERPL W P-5'-P-CCNC: 20 U/L (ref 10–50)
BILIRUB SERPL-MCNC: 2.5 MG/DL
BUN SERPL-MCNC: 7.9 MG/DL (ref 8–23)
CALCIUM SERPL-MCNC: 8.1 MG/DL (ref 8.8–10.2)
CHLORIDE SERPL-SCNC: 103 MMOL/L (ref 98–107)
CREAT SERPL-MCNC: 1.01 MG/DL (ref 0.67–1.17)
CRP SERPL-MCNC: 5 MG/L
DEPRECATED HCO3 PLAS-SCNC: 27 MMOL/L (ref 22–29)
ERYTHROCYTE [DISTWIDTH] IN BLOOD BY AUTOMATED COUNT: 13.2 % (ref 10–15)
GFR SERPL CREATININE-BSD FRML MDRD: 83 ML/MIN/1.73M2
GLUCOSE SERPL-MCNC: 99 MG/DL (ref 70–99)
HCT VFR BLD AUTO: 38.2 % (ref 40–53)
HGB BLD-MCNC: 12.8 G/DL (ref 13.3–17.7)
MCH RBC QN AUTO: 32 PG (ref 26.5–33)
MCHC RBC AUTO-ENTMCNC: 33.5 G/DL (ref 31.5–36.5)
MCV RBC AUTO: 96 FL (ref 78–100)
PLATELET # BLD AUTO: 207 10E3/UL (ref 150–450)
POTASSIUM SERPL-SCNC: 4.3 MMOL/L (ref 3.4–5.3)
PROT SERPL-MCNC: 5.8 G/DL (ref 6.4–8.3)
RBC # BLD AUTO: 4 10E6/UL (ref 4.4–5.9)
SODIUM SERPL-SCNC: 136 MMOL/L (ref 136–145)
WBC # BLD AUTO: 4.3 10E3/UL (ref 4–11)

## 2022-11-08 PROCEDURE — 85027 COMPLETE CBC AUTOMATED: CPT | Performed by: FAMILY MEDICINE

## 2022-11-08 PROCEDURE — 250N000013 HC RX MED GY IP 250 OP 250 PS 637: Performed by: FAMILY MEDICINE

## 2022-11-08 PROCEDURE — 96361 HYDRATE IV INFUSION ADD-ON: CPT

## 2022-11-08 PROCEDURE — 80053 COMPREHEN METABOLIC PANEL: CPT | Performed by: FAMILY MEDICINE

## 2022-11-08 PROCEDURE — G0378 HOSPITAL OBSERVATION PER HR: HCPCS

## 2022-11-08 PROCEDURE — 250N000011 HC RX IP 250 OP 636: Performed by: FAMILY MEDICINE

## 2022-11-08 PROCEDURE — 99217 PR OBSERVATION CARE DISCHARGE: CPT | Performed by: FAMILY MEDICINE

## 2022-11-08 PROCEDURE — 36415 COLL VENOUS BLD VENIPUNCTURE: CPT | Performed by: FAMILY MEDICINE

## 2022-11-08 PROCEDURE — 86140 C-REACTIVE PROTEIN: CPT | Performed by: FAMILY MEDICINE

## 2022-11-08 RX ORDER — NALOXONE HYDROCHLORIDE 0.4 MG/ML
0.2 INJECTION, SOLUTION INTRAMUSCULAR; INTRAVENOUS; SUBCUTANEOUS
Status: DISCONTINUED | OUTPATIENT
Start: 2022-11-08 | End: 2022-11-08 | Stop reason: HOSPADM

## 2022-11-08 RX ORDER — NALOXONE HYDROCHLORIDE 0.4 MG/ML
0.4 INJECTION, SOLUTION INTRAMUSCULAR; INTRAVENOUS; SUBCUTANEOUS
Status: DISCONTINUED | OUTPATIENT
Start: 2022-11-08 | End: 2022-11-08 | Stop reason: HOSPADM

## 2022-11-08 RX ADMIN — APIXABAN 5 MG: 5 TABLET, FILM COATED ORAL at 08:56

## 2022-11-08 RX ADMIN — FLECAINIDE ACETATE 150 MG: 50 TABLET ORAL at 08:55

## 2022-11-08 RX ADMIN — DILTIAZEM HYDROCHLORIDE 120 MG: 120 CAPSULE, EXTENDED RELEASE ORAL at 08:56

## 2022-11-08 RX ADMIN — POTASSIUM CHLORIDE AND SODIUM CHLORIDE: 900; 150 INJECTION, SOLUTION INTRAVENOUS at 01:50

## 2022-11-08 RX ADMIN — PANTOPRAZOLE SODIUM 40 MG: 40 TABLET, DELAYED RELEASE ORAL at 08:56

## 2022-11-08 ASSESSMENT — ACTIVITIES OF DAILY LIVING (ADL)
ADLS_ACUITY_SCORE: 31

## 2022-11-08 NOTE — PROGRESS NOTES
Patient is admitted from ED with small bowel obstruction. Patient is alert and oriented.  Patient denies pain or nausea. Bowel sounds are present. Patient is on clear liquid diet. Monitor.

## 2022-11-08 NOTE — PROGRESS NOTES
Care Management Follow Up    Length of Stay (days): 0    Expected Discharge Date: 11/08/2022    Concerns to be Addressed:   Alteration in gastrointestinal function; IV Fluids at 100 ml/hour, clear liquids as tolerated. GI consult pending. C Diff pending.   Patient plan of care discussed at interdisciplinary rounds: Yes    Anticipated Discharge Disposition:  Home      Anticipated Discharge Services:  None  Anticipated Discharge DME:  None    Patient/family educated on Medicare website which has current facility and service quality ratings:  NA  Education Provided on the Discharge Plan:  Per team  Patient/Family in Agreement with the Plan:  yes    Referrals Placed by CM/SW:  none  Private pay costs discussed: Not applicable at this time.     Additional Information:  Patient is  and independent with activities of daily living at baseline.  No care management needs identified at this time but CM will continue to monitor progression of care, review team recommendations and provide discharge planning assist as needed.      Cherri Glover RN

## 2022-11-08 NOTE — PLAN OF CARE
Observation goals  PRIOR TO DISCHARGE         Diagnostic tests and consults completed and resulted: NOT MET-  GI has not consulted yet, blood cultures obtained results pending, stool studies ordered, stool sample is yet to be collected.     Vital signs normal or at patient baseline: MET    Tolerating oral intake to maintain hydration: PARTIALLY MET- Pt is on clear liquid and drinking adequately, IV fluid running at at 100 ml/hr.     Adequate pain control on oral analgesics: MET, no complain of major pain, just slight tenderness at the mid abdominal area, declined medication intervention at this time.     Nurse to notify provider when observation goals have been met and patient is ready for discharge.

## 2022-11-08 NOTE — CONSULTS
[facility] Consultation     Rogers ADHIKARI Juan  [address]  [place] [address]  62 year old male     Admission Date/Time: 11/7/2022  Primary Care Provider: Ahsan Andrew     We were asked to see the patient in consultation by Dr. Monroe for evaluation of Ileitis.    ASSESSMENT:    61 yo male admitted on 11/7/22 for new onset of abdominal pains, nausea, vomiting, and diarrhea. CT noting colitis and ileitis with concerns of possible mild obstruction.    1. Ileitis  2. SBO    - Appears improved. He is passing gas and pains are improved. No further nausea/vomiting or diarrhea. He has had off-and-on episodes of SBO for 30+ years, possibly related to previous appendectomy due to a ruptured appendix. He has had several colonoscopies (most recently in 08/2021) without evidence of Crohn's or mucosal diseases involving the ileum. Doubtful this would be late onset IBD but certainly remains a possibility. With the colitis and ileitis, I suspect this is more of an infectious process. Would recommend stool studies if symptoms do not improve. Ischemic colitis/enteritis is on the differential although the distrubution of inflammation does not entirely fit.     RECOMMENDATIONS:  - Patient would like to be discharged today. I think this could be reasonable if he continues to do well. However if symptoms should worsen, he should complete the stool studies to rule out infections.     - Can consider outpatient colonoscopy or MR enterography if symptoms continue to persist.     - Diet: low-residue for the next few days     Case discussed with Dr. Art, please review MD addendum below.    Approximately 45 minutes of total time was spent providing patient care, including patient evaluation, reviewing documentation/test results, and     Troy Claudio PA-C  [facility]  [phone]  ________________________________________________________________________        CC: Abdominal pains      HPI:  Rogers Martinez is a 62 year old male with PMH of recurrent small bowel obstructions over the past 30 years who came into the emergency room department for further work-up and evaluation of a 2-day history of right lower quadrant abdominal cramping in the setting of vomiting and diarrhea.    Pt explains new onset of abdominal pains starting in the mid abdomen and localizing in the RLQ. Pain started about 3 days ago with associated diarrhea on 1-2 occassions. No bloody stools or melena. Nausea and vomiting began yesterday morning with associated decreased appetite. He feels like his symptoms were similar to previous bouts of SBO. He reports at least a 30+ year of off-and-on SBO that would occur about once every 2 years. Most episodes were self limited with conservative therapies. 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 No history of IBD. His most recent colonoscopy was in 08/2021 for CRC screening: noting 2 polyps but was otherwise normal from rectum to ileum.     In the hospital:   -Vitals stable, afebrile.   -Labs notable for Hgb 12.8, WBC 11.5 -> 4.3, CRP 5.4 -> 5.0. LFT's stable except for T bilirubin 2.5. Lactic acid 0.6. Stool studies ordered but pending collection.  -Imaging: CT abd/pelvis - ileitis and colitis with dilated small bowel loops which could represent mild obstruction due to ileitis.         ROS: A comprehensive ten point review of systems was negative aside from those in mentioned in the HPI.      PAST MEDICAL HISTORY:  Patient Active Problem List    Diagnosis Date Noted     Gastroesophageal reflux disease, esophagitis presence not specified      Priority: Medium     IMO Regulatory Load OCT 2020         Hyperlipidemia 08/08/2018     Priority: Medium     Declines statin.         Abnormal LFTs      Priority: Medium     Hyperglycemia      Priority: Medium     Small bowel obstruction (H) 07/24/2018     Priority: Medium      "Family history of abdominal aortic aneurysm 2017     Priority: Medium     Father  at age 65 from ruptured AAA. 2017 U/S - no aneurysm. Consider   recheck .         Paroxysmal atrial fibrillation (H) 10/19/2016     Priority: Medium     CHADS2 Vasc 0  PVI 2017 with Dr. Marquez, failed flecainide.   Recurrence on EKG 19, DCCV in ER         Hx SBO 2016     Priority: Medium     History of recurrent SBOs. Thought to be due to adhesions. They have   always resolved with conservative mgmt.         Overweight 2015     Priority: Medium     SOCIAL HISTORY:  Social History     Tobacco Use     Smoking status: Former     Types: Cigarettes, Cigars     Smokeless tobacco: Former     Types: Chew   Substance Use Topics     Alcohol use: Yes     Comment: 12-15 drinks/week     Drug use: No     FAMILY HISTORY:  Family History   Problem Relation Age of Onset     Atrial fibrillation Mother      Cerebrovascular Disease Mother      Aortic aneurysm Father      Atrial fibrillation Sister      Atrial fibrillation Brother      ALLERGIES:   Allergies   Allergen Reactions     Terbinafine Hives     MEDICATIONS:   Current Facility-Administered Medications   Medication     acetaminophen (TYLENOL) tablet 650 mg    Or     acetaminophen (TYLENOL) Suppository 650 mg     apixaban ANTICOAGULANT (ELIQUIS) tablet 5 mg     diltiazem ER (TIAZAC) CP24 120 mg     flecainide (TAMBOCOR) tablet 150 mg     HYDROmorphone (PF) (DILAUDID) injection 0.2 mg     melatonin tablet 1 mg     naloxone (NARCAN) injection 0.2 mg    Or     naloxone (NARCAN) injection 0.4 mg    Or     naloxone (NARCAN) injection 0.2 mg    Or     naloxone (NARCAN) injection 0.4 mg     ondansetron (ZOFRAN ODT) ODT tab 4 mg    Or     ondansetron (ZOFRAN) injection 4 mg     pantoprazole (PROTONIX) EC tablet 40 mg       PHYSICAL EXAM:   /70 (BP Location: Left arm)   Pulse 51   Temp 98.3  F (36.8  C) (Oral)   Resp 18   Ht 1.905 m (6' 3\")   Wt 98.4 kg (217 " lb)   SpO2 95%   BMI 27.12 kg/m     GEN: Alert, oriented x3, communicative and in NAD.  JASE: AT, anicteric, OP without erythema, exudate, or ulcers.    NECK: Supple.    LYMPH: No LAD noted.  HRT: RRR  LUNGS: CTA  ABD:  ND, +BS, mild pain to palpation, no rebound, no HSM.  SKIN: No rash, jaundice or spider angiomata  MSKL: LE free of edema, strength 5/5 all 4 extrems  NEURO: CN grossly intact, sensation intact to light touch, toes downgoing.     ADDITIONAL DATA:   I reviewed the patient's new clinical lab test results.   Recent Labs   Lab Test 11/08/22  0710 11/07/22  0917 09/28/22  0616   WBC 4.3 11.5* 5.1   HGB 12.8* 16.2 13.7   MCV 96 95 94    287 228     Recent Labs   Lab Test 11/08/22  0710 11/07/22  0917 09/28/22  0616   POTASSIUM 4.3 4.6 3.9   CHLORIDE 103 99 104   CO2 27 27 25   BUN 7.9* 11.5 10.9   CR 1.01 1.01 0.92   ANIONGAP 6* 10 10     Recent Labs   Lab Test 11/08/22  0710 11/07/22  1029 11/07/22  0917 07/25/18  0803 07/24/18  2056   ALBUMIN 3.8  --  4.6 3.8 4.8   BILITOTAL 2.5*  --  3.2* 3.7* 3.5*   ALT 23  --  31 24 35   AST 20  --  24 21 26   PROTEIN  --  Negative  --   --   --    LIPASE  --   --  27  --  49        IMAGING:  I reviewed the patient's new imaging results.

## 2022-11-08 NOTE — PLAN OF CARE
"Goal Outcome Evaluation:                  PRIMARY DIAGNOSIS: \"GENERIC\" NURSING  OUTPATIENT/OBSERVATION GOALS TO BE MET BEFORE DISCHARGE:  1. ADLs back to baseline: Yes    2. Activity and level of assistance: Ambulating independently.    3. Pain status: Pain free.    4. Return to near baseline physical activity: Yes     Discharge Planner Nurse   Safe discharge environment identified: Yes  Barriers to discharge: Yes   GI consult pending. Stool sample pending.        Entered by: Davie Ferguson RN 11/08/2022 12:15 PM    Please review provider order for any additional goals.   Nurse to notify provider when observation goals have been met and patient is ready for discharge.  "

## 2022-11-08 NOTE — PLAN OF CARE
Observation goals  PRIOR TO DISCHARGE          Diagnostic tests and consults completed and resulted: NOT MET-  GI has not consulted yet, blood cultures obtained results pending, stool studies ordered, stool sample is yet to be collected.      Vital signs normal or at patient baseline: MET     Tolerating oral intake to maintain hydration: PARTIALLY MET- Pt is on clear liquid and drinking adequately, IV fluid running at at 100 ml/hr.      Adequate pain control on oral analgesics: MET, no complain of major pain, just slight tenderness at the mid abdominal area, declined medication intervention at this time.      Nurse to notify provider when observation goals have been met and patient is ready for discharge.     Admitted with small bowel obstruction, hypoactive bounds sounds. Alert and oriented. On room air. Continent of bowel and bladder. Independent in the room. Fluids(20 meq of K+ 0.9 NS) running at 100 ml/hr. Denies pain. Still need stool sample.

## 2022-11-08 NOTE — PLAN OF CARE
"PRIMARY DIAGNOSIS: \"GENERIC\" NURSING  OUTPATIENT/OBSERVATION GOALS TO BE MET BEFORE DISCHARGE:  ADLs back to baseline: No    Activity and level of assistance: Up with standby assistance.    Pain status: Pain free.    Return to near baseline physical activity: No     Discharge Planner Nurse   Safe discharge environment identified: Yes  Barriers to discharge: Yes       Entered by: Davie Ferguson RN 11/07/2022 6:47 PM     Please review provider order for any additional goals.   Nurse to notify provider when observation goals have been met and patient is ready for discharge.                        "

## 2022-11-08 NOTE — PLAN OF CARE
Patient is discharge to home. Patient received all discharge instructions and his questions answered. Patient is walking. Patient has all his belongings. Patient said his wife is waiting to pick him up at the front.

## 2022-11-09 ENCOUNTER — OFFICE VISIT (OUTPATIENT)
Dept: CARDIOLOGY | Facility: CLINIC | Age: 62
End: 2022-11-09
Payer: COMMERCIAL

## 2022-11-09 VITALS
HEART RATE: 61 BPM | DIASTOLIC BLOOD PRESSURE: 70 MMHG | BODY MASS INDEX: 28 KG/M2 | OXYGEN SATURATION: 98 % | SYSTOLIC BLOOD PRESSURE: 126 MMHG | RESPIRATION RATE: 18 BRPM | WEIGHT: 224 LBS

## 2022-11-09 DIAGNOSIS — I48.0 PAROXYSMAL ATRIAL FIBRILLATION (H): ICD-10-CM

## 2022-11-09 PROCEDURE — 99214 OFFICE O/P EST MOD 30 MIN: CPT | Performed by: NURSE PRACTITIONER

## 2022-11-09 NOTE — LETTER
"11/9/2022    Ahsan Andrew MD  [address]  [facility] [address]    RE: Rogers Martinez       Dear Colleague,     I had the pleasure of seeing Rogers Martinez in the [facility].    HEART CARE ELECTROPHYSIOLOGY NOTE      [facility]  [phone]      Assessment/Recommendations   Assessment/Plan:  1.  Paroxysmal Atrial Fibrillation:  No symptomatology or evidence of AF recurrence since the first couple of weeks after ablation.  He had an uneventful recovery from ablation with no rhythm or procedure related emergency department or unscheduled clinic visits.  He was briefly hospitalized for recurrent (acute on chronic) GI issues.  Discontinue flecainide and diltiazem.    He was reassured that atrial fibrillation is not life-threatening, but carries an increased risk of stroke.  However, he has a [id number] score of 0.  HAS-BLED score of 0.   Continue Eliquis 5 mg twice daily 5 days prior to ablation.  We discussed this will continue for at least 3 months following ablation.    Follow-up with Dr. Giraldo 3 months post PVI     History of Present Illness/Subjective    HPI: Rogers Martinez is a 62 year old male who comes in for EP follow up of atrial fibrillation and history and physical prior to pulmonary vein isolation ablation.  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    He was diagnosed with atrial fibrillation on 10/16/2016.  Symptoms consist of palpitations, fatigue, dyspnea exertion, lightheadedness, and a \"roller coaster feeling\" in his abdomen.  He failed antiarrhythmic therapy with flecainide.  He underwent pulmonary vein isolation ablation with Dr. Marquez on 7/12/2017 with RF to all 4 pulmonary veins.  He had recurrence of A. fib in August 2019 for which he underwent early cardioversion in the ED.  He had recurrent AF on 3/30/2022 and " 4/8/2022 after which he was started on flecainide 150 mg twice daily in addition to metoprolol succinate 25 mg daily.  Metoprolol was discontinued due to side effects and he was started on diltiazem 120 mg daily.  He underwent redo ablation on 9/28/2022 with Dr. Giraldo with RF reisolation of the pulmonary veins.  He is on Eliquis for stroke prophylaxis, started 5 days prior to ablation.    He was briefly hospitalized on 11/7/2022 for ileitis and possible partial small bowel obstruction which resolved without intervention.    Rogers states that he is feeling well.  He had a couple of episodes in the first couple of weeks after ablation, but none since.  He reports an uneventful recovery from ablation and denies groin site issues, significant heartburn, difficulty swallowing, or neurologic changes.  He denies chest discomfort, palpitations, abdominal fullness/bloating or peripheral edema, shortness of breath, paroxysmal nocturnal dyspnea, orthopnea, lightheadedness, dizziness, pre-syncope, or syncope.    Cardiographics (EKG personally reviewed):  EKG done 9/28/2022 shows sinus rhythm at 60 bpm, QRS 88 ms, QT/QTc 448/448 ms.  EKG done 4/8/2022 shows atrial fibrillation with rapid ventricular response at 149 bpm.  Subsequent EKG shows sinus rhythm at 62 bpm.    Stress echo done 4/27/2022:  Patient exercised 9:55 seconds on a Mannie protocol stopping secondary to  fatigue  Appropriate heart rate and blood pressure response to exercise.Patient  achieved 84% of maximum predicted heart rate  Exercise ECG is negative for ischemia at the workload achieved  Rest Echo:Definity utilized.Normal LV function.LVEF estimate at 60-65%  Post Exercise Echo:Appropriate augmentation of systolic function without  observed wall motion abnormality  Conclusions: Exercise stress echocardiogram is negative for ischemia at the  workload achieved.Maximum heart rate achieved was mildly reduced at 84%.Post  exercise images are mildly  challenging.    CTA pulmonary veins done 9/28/2017:  esophagus appears to travel behind the left   atrial near the left inferior pulmonary vein    I have reviewed and updated the patient's Past Medical History, Social History, Family History and Medication List.  Outside records personally reviewed.     Physical Examination  Review of Systems   Vitals: /70 (BP Location: Left arm, Patient Position: Sitting, Cuff Size: Adult Large)   Pulse 61   Resp 18   Wt 101.6 kg (224 lb)   SpO2 98%   BMI 28.00 kg/m    BMI= Body mass index is 28 kg/m .  Wt Readings from Last 3 Encounters:   11/09/22 101.6 kg (224 lb)   11/07/22 98.4 kg (217 lb)   09/28/22 100.6 kg (221 lb 11.2 oz)       General Appearance:   Alert, well-appearing and in no acute distress.   HEENT: Atraumatic, normocephalic.  No scleral icterus, normal conjunctivae, EOMs intact, PERRL.  Wearing a mask.   Chest/Lungs:   Chest symmetric, spine straight.  Respirations unlabored.  Lungs are clear to auscultation.   Cardiovascular:   Regular rate and rhythm.  Normal first and second heart sounds with no murmurs, rubs, or gallops; radial and posterior tibial pulses are intact, No edema.   Abdomen:  Soft, nondistended, bowel sounds present.   Extremities: No cyanosis or clubbing.   Musculoskeletal: Moves all extremities.     Skin: Warm, dry, intact.    Neurologic: Mood and affect are appropriate.  Alert and oriented to person, place, time, and situation.     ROS: 10 point ROS neg other than the symptoms noted above in the HPI.         Medical History  Surgical History Family History Social History   Past Medical History:   Diagnosis Date     Paroxysmal atrial fibrillation (H)      Past Surgical History:   Procedure Laterality Date     APPENDECTOMY       BOWEL RESECTION      with appy, age 9     CARDIOVERSION      10/16/16, 4/16/17, 8/16/19     LA EPHYS EVL TRNSPTL TX ATRIAL FIB ISOLAT PULM VEIN Left 7/12/2017    Procedure: EP Ablation PVI  RF to 4 PV;  Surgeon:  Celina Marquez MD;      Family History   Problem Relation Age of Onset     Atrial fibrillation Mother      Cerebrovascular Disease Mother      Aortic aneurysm Father      Atrial fibrillation Sister      Atrial fibrillation Brother         Social History     Socioeconomic History     Marital status:      Spouse name: Not on file     Number of children: Not on file     Years of education: Not on file     Highest education level: Not on file   Occupational History     Not on file   Tobacco Use     Smoking status: Former     Types: Cigarettes, Cigars     Smokeless tobacco: Former     Types: Chew   Substance and Sexual Activity     Alcohol use: Yes     Comment: 12-15 drinks/week     Drug use: No     Sexual activity: Yes   Other Topics Concern     Not on file   Social History Narrative    .     Social Determinants of Health     Financial Resource Strain: Not on file   Food Insecurity: Not on file   Transportation Needs: Not on file   Physical Activity: Not on file   Stress: Not on file   Social Connections: Not on file   Intimate Partner Violence: Not on file   Housing Stability: Not on file           Medications  Allergies   Current Outpatient Medications   Medication Sig Dispense Refill     apixaban ANTICOAGULANT (ELIQUIS) 5 MG tablet Take 1 tablet (5 mg) by mouth 2 times daily 60 tablet 4     cholecalciferol, vitamin D3, 5,000 unit Tab [CHOLECALCIFEROL, VITAMIN D3, 5,000 UNIT TAB] Take 2,000 Units by mouth daily.                Allergies   Allergen Reactions     Terbinafine Hives          Lab Results    Chemistry/lipid CBC Cardiac Enzymes/BNP/TSH/INR   Recent Labs   Lab Test 07/11/22  1102   CHOL 193   HDL 46   *   TRIG 125     Recent Labs   Lab Test 07/11/22  1102 04/16/21  1212   * 141*     Recent Labs   Lab Test 11/08/22  0710 11/07/22  0917    136   POTASSIUM 4.3 4.6   CHLORIDE 103 99   CO2 27 27   ANIONGAP 6* 10   GLC 99 112*   BUN 7.9* 11.5   CR 1.01 1.01   JOSE 8.1*  9.0      CBC RESULTS: Recent Labs   Lab Test 11/08/22  0710   WBC 4.3   RBC 4.00*   HGB 12.8*   HCT 38.2*   MCV 96   MCH 32.0   MCHC 33.5   RDW 13.2         Recent Labs   Lab Test 04/08/22  0728 03/30/22  0748 08/16/19  1516   TROPONINI 0.02 0.03 <0.01     No results for input(s): BNP, NTBNPI, NTBNP in the last 48500 hours.  Recent Labs   Lab Test 04/08/22  0728   TSH 2.95     No results for input(s): INR in the last 78339 hours.               Thank you for allowing me to participate in the care of your patient.      Sincerely,     FABBY Salinas CNP     [facility]    cc:   FABBY Salinas CNP  [address], [address]  [place] [address]

## 2022-11-09 NOTE — DISCHARGE SUMMARY
[facility]  Hospitalist Discharge Summary      Date of Admission:  11/7/2022  Date of Discharge:  11/8/2022  2:48 PM  Discharging Provider: Rika Monroe MD  Discharge Service: Hospitalist Service    Discharge Diagnoses       Ileitis, resolved.  Unclear etiology.  If has recurrent symptoms consider outpatient colonoscopy or MR enterography per GI    Mild small bowel obstruction, resolved    Paroxysmal atrial fibrillation, status post recent ablation    Follow-ups Needed After Discharge   Follow-up Appointments     Follow-up and recommended labs and tests       Follow up with primary care provider, Ahsan Andrew, within 7   days for hospital follow- up.    Follow up with [facility] if symptoms recur otherwise for routine colonoscopy  GI recommends; consider outpatient colonoscopy or MR enterography if   symptoms continue to persist.             Unresulted Labs Ordered in the Past 30 Days of this Admission     Date and Time Order Name Status Description    11/7/2022 11:17 AM Blood Culture Line, venous Preliminary     11/7/2022 11:17 AM Blood Culture Peripheral Blood Preliminary           Discharge Disposition   Discharged to home  Condition at discharge: Stable      Hospital Course      Rogers Martinez is a 62 year old male with history of previous recurrent partial small bowel obstructions admitted to the hospital for further work-up and evaluation of ileitis    Ileitis/colitis  --- Patient admitted to the hospital where he was treated with IV fluids and clear liquid diet.  Symptoms completely resolved.  No true evidence of bowel obstruction is had stool immediately prior to admission.  Patient seen by GI who felt patient stable for discharge and were doubtful this would be late onset IBD.  Was unable to complete stool studies due to no stool since admission.  They suspected more of infectious process.  Also doubted ischemic colitis.   --- No previous diagnoses of ileitis noted in previous  CTs reviewed.    --- Is on AC for Afib with recent ablation  --- Given Cipro Flagyl emergency room department but will hold off on further antibiotics and GI in agreement    Paroxysmal atrial fibrillation  --- No issues during hospital stay.    ---Status post recent ablation.  Had previous ablation 2017 as well  --- Continue home diltiazem, flecainide, apixaban  --- Continue home Protonix, on for 6 weeks after ablation.        Consultations This Hospital Stay   GASTROENTEROLOGY IP CONSULT  CARE MANAGEMENT / SOCIAL WORK IP CONSULT    Code Status   Prior    Time Spent on this Encounter   I, Rika Monroe MD, personally saw the patient today and spent greater than 30 minutes discharging this patient.       Rika Monroe MD  [address] [address]  Phone: [phone]  Fax: [phone]  ______________________________________________________________________    Physical Exam   Vital Signs: Temp: 98.3  F (36.8  C) Temp src: Oral BP: 127/70 Pulse: 51   Resp: 18 SpO2: 95 % O2 Device: None (Room air)    Weight: 217 lbs 0 oz  General Appearance: Pleasant male, no apparent distress  Respiratory: Clear to auscultation bilaterally  Cardiovascular: Regular rate and rhythm without murmurs rubs or gallops  GI: Very mild right lower quadrant tenderness but less than yesterday.  Skin: No open areas or significant lower extremity edema  Other: Neurologically grossly intact without focal deficits appreciated       Primary Care Physician   Ahsan Andrew    Discharge Orders      Reason for your hospital stay    Colitis and Ileitis     Activity    Your activity upon discharge: activity as tolerated     Follow-up and recommended labs and tests     Follow up with primary care provider, Ahsan Andrew, within 7 days for hospital follow- up.    Follow up with [facility] if symptoms recur otherwise for routine colonoscopy  GI recommends; consider outpatient colonoscopy or MR  enterography if symptoms continue to persist.     Diet    Follow this diet upon discharge: Regular diet.  Increase slowly over the next 1-2 days back to regular foods.  GI recommends a low residue diet       Significant Results and Procedures   Most Recent 3 CBC's:Recent Labs   Lab Test 11/08/22  0710 11/07/22  0917 09/28/22  0616   WBC 4.3 11.5* 5.1   HGB 12.8* 16.2 13.7   MCV 96 95 94    287 228     Most Recent 3 BMP's:Recent Labs   Lab Test 11/08/22  0710 11/07/22  0917 09/28/22  0616    136 139   POTASSIUM 4.3 4.6 3.9   CHLORIDE 103 99 104   CO2 27 27 25   BUN 7.9* 11.5 10.9   CR 1.01 1.01 0.92   ANIONGAP 6* 10 10   JOSE 8.1* 9.0 8.4*   GLC 99 112* 101*     Most Recent 2 LFT's:Recent Labs   Lab Test 11/08/22  0710 11/07/22 0917   AST 20 24   ALT 23 31   ALKPHOS 83 116   BILITOTAL 2.5* 3.2*     Most Recent CPK:No lab results found.,   Results for orders placed or performed during the hospital encounter of 11/07/22   CT Abdomen Pelvis w Contrast    Narrative    EXAM: CT ABDOMEN PELVIS W CONTRAST  LOCATION: [facility]  DATE/TIME: 11/7/2022 10:24 AM    INDICATION: abdominal pain with h o SBO and remote appendectomy  COMPARISON: 07/24/2018   TECHNIQUE: CT scan of the abdomen and pelvis was performed following injection of IV contrast. Multiplanar reformats were obtained. Dose reduction techniques were used.  CONTRAST: IsoVue 370 100mL    FINDINGS:   LOWER CHEST: Normal.    HEPATOBILIARY: Normal.    PANCREAS: Normal.    SPLEEN: Normal.    ADRENAL GLANDS: Normal.    KIDNEYS/BLADDER: Normal.    BOWEL: Normal stomach. A few loops of small bowel are mildly dilated with a maximum diameter of 3.4 cm. Air-fluid levels are present. This extends to the distal small bowel, where there are multiple areas of necrosis enhancement and wall thickening   (series 2 image 159, for example). This involves a long segment of the terminal ileum and distal ileum. There is wall thickening of the  ascending, transverse, and descending colon. There is a small amount of free fluid in the pelvis. No free air.  No   definite fistula identified. No abscess. The appendix is not present.    LYMPH NODES: Normal.    VASCULATURE: Unremarkable.    PELVIC ORGANS: Normal.    MUSCULOSKELETAL: Normal.      Impression    IMPRESSION:   1.  Infectious or inflammatory ileitis and colitis. Crohn disease should be considered.  2.  Dilated small bowel loops with air-fluid levels represent a mild obstruction related to the ileitis.       Discharge Medications   Discharge Medication List as of 11/8/2022  2:42 PM      CONTINUE these medications which have NOT CHANGED    Details   apixaban ANTICOAGULANT (ELIQUIS) 5 MG tablet Take 1 tablet (5 mg) by mouth 2 times daily, Disp-60 tablet, R-4, E-Prescribe      cholecalciferol, vitamin D3, 5,000 unit Tab [CHOLECALCIFEROL, VITAMIN D3, 5,000 UNIT TAB] Take 2,000 Units by mouth daily.       , Historical      diltiazem ER (TIAZAC) 120 MG 24 hr ER beaded capsule Take 1 capsule (120 mg) by mouth daily, Historical      flecainide (TAMBOCOR) 150 MG tablet TAKE ONE TABLET BY MOUTH TWICE DAILY, Disp-60 tablet, R-0, E-Prescribe      pantoprazole (PROTONIX) 40 MG EC tablet Take 1 tablet (40 mg) by mouth daily Continue for 6 weeks after ablation, Disp-45 tablet, R-0, E-Prescribe           Allergies   Allergies   Allergen Reactions     Terbinafine Hives

## 2022-11-09 NOTE — PROGRESS NOTES
"  HEART CARE ELECTROPHYSIOLOGY NOTE      [facility]  [phone]      Assessment/Recommendations   Assessment/Plan:  1.  Paroxysmal Atrial Fibrillation:  No symptomatology or evidence of AF recurrence since the first couple of weeks after ablation.  He had an uneventful recovery from ablation with no rhythm or procedure related emergency department or unscheduled clinic visits.  He was briefly hospitalized for recurrent (acute on chronic) GI issues.  Discontinue flecainide and diltiazem.    He was reassured that atrial fibrillation is not life-threatening, but carries an increased risk of stroke.  However, he has a [id number] score of 0.  HAS-BLED score of 0.   Continue Eliquis 5 mg twice daily 5 days prior to ablation.  We discussed this will continue for at least 3 months following ablation.    Follow-up with Dr. Giraldo 3 months post PVI     History of Present Illness/Subjective    HPI: Rogers Martinez is a 62 year old male who comes in for EP follow up of atrial fibrillation and history and physical prior to pulmonary vein isolation ablation.  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    He was diagnosed with atrial fibrillation on 10/16/2016.  Symptoms consist of palpitations, fatigue, dyspnea exertion, lightheadedness, and a \"roller coaster feeling\" in his abdomen.  He failed antiarrhythmic therapy with flecainide.  He underwent pulmonary vein isolation ablation with Dr. Marquez on 7/12/2017 with RF to all 4 pulmonary veins.  He had recurrence of A. fib in August 2019 for which he underwent early cardioversion in the ED.  He had recurrent AF on 3/30/2022 and 4/8/2022 after which he was started on flecainide 150 mg twice daily in addition to metoprolol succinate 25 mg daily.  Metoprolol was discontinued due to side effects and he was started on diltiazem 120 mg daily.  He " underwent redo ablation on 9/28/2022 with Dr. Giraldo with RF reisolation of the pulmonary veins.  He is on Eliquis for stroke prophylaxis, started 5 days prior to ablation.    He was briefly hospitalized on 11/7/2022 for ileitis and possible partial small bowel obstruction which resolved without intervention.    Rogers states that he is feeling well.  He had a couple of episodes in the first couple of weeks after ablation, but none since.  He reports an uneventful recovery from ablation and denies groin site issues, significant heartburn, difficulty swallowing, or neurologic changes.  He denies chest discomfort, palpitations, abdominal fullness/bloating or peripheral edema, shortness of breath, paroxysmal nocturnal dyspnea, orthopnea, lightheadedness, dizziness, pre-syncope, or syncope.    Cardiographics (EKG personally reviewed):  EKG done 9/28/2022 shows sinus rhythm at 60 bpm, QRS 88 ms, QT/QTc 448/448 ms.  EKG done 4/8/2022 shows atrial fibrillation with rapid ventricular response at 149 bpm.  Subsequent EKG shows sinus rhythm at 62 bpm.    Stress echo done 4/27/2022:  Patient exercised 9:55 seconds on a Mannie protocol stopping secondary to  fatigue  Appropriate heart rate and blood pressure response to exercise.Patient  achieved 84% of maximum predicted heart rate  Exercise ECG is negative for ischemia at the workload achieved  Rest Echo:Definity utilized.Normal LV function.LVEF estimate at 60-65%  Post Exercise Echo:Appropriate augmentation of systolic function without  observed wall motion abnormality  Conclusions: Exercise stress echocardiogram is negative for ischemia at the  workload achieved.Maximum heart rate achieved was mildly reduced at 84%.Post  exercise images are mildly challenging.    CTA pulmonary veins done 9/28/2017:  esophagus appears to travel behind the left   atrial near the left inferior pulmonary vein    I have reviewed and updated the patient's Past Medical History, Social History,  Family History and Medication List.  Outside records personally reviewed.     Physical Examination  Review of Systems   Vitals: /70 (BP Location: Left arm, Patient Position: Sitting, Cuff Size: Adult Large)   Pulse 61   Resp 18   Wt 101.6 kg (224 lb)   SpO2 98%   BMI 28.00 kg/m    BMI= Body mass index is 28 kg/m .  Wt Readings from Last 3 Encounters:   11/09/22 101.6 kg (224 lb)   11/07/22 98.4 kg (217 lb)   09/28/22 100.6 kg (221 lb 11.2 oz)       General Appearance:   Alert, well-appearing and in no acute distress.   HEENT: Atraumatic, normocephalic.  No scleral icterus, normal conjunctivae, EOMs intact, PERRL.  Wearing a mask.   Chest/Lungs:   Chest symmetric, spine straight.  Respirations unlabored.  Lungs are clear to auscultation.   Cardiovascular:   Regular rate and rhythm.  Normal first and second heart sounds with no murmurs, rubs, or gallops; radial and posterior tibial pulses are intact, No edema.   Abdomen:  Soft, nondistended, bowel sounds present.   Extremities: No cyanosis or clubbing.   Musculoskeletal: Moves all extremities.     Skin: Warm, dry, intact.    Neurologic: Mood and affect are appropriate.  Alert and oriented to person, place, time, and situation.     ROS: 10 point ROS neg other than the symptoms noted above in the HPI.         Medical History  Surgical History Family History Social History   Past Medical History:   Diagnosis Date     Paroxysmal atrial fibrillation (H)      Past Surgical History:   Procedure Laterality Date     APPENDECTOMY       BOWEL RESECTION      with appy, age 9     CARDIOVERSION      10/16/16, 4/16/17, 8/16/19     NE EPHYS EVL TRNSPTL TX ATRIAL FIB ISOLAT PULM VEIN Left 7/12/2017    Procedure: EP Ablation PVI  RF to 4 PV;  Surgeon: Celina Marquez MD;      Family History   Problem Relation Age of Onset     Atrial fibrillation Mother      Cerebrovascular Disease Mother      Aortic aneurysm Father      Atrial fibrillation Sister      Atrial  fibrillation Brother         Social History     Socioeconomic History     Marital status:      Spouse name: Not on file     Number of children: Not on file     Years of education: Not on file     Highest education level: Not on file   Occupational History     Not on file   Tobacco Use     Smoking status: Former     Types: Cigarettes, Cigars     Smokeless tobacco: Former     Types: Chew   Substance and Sexual Activity     Alcohol use: Yes     Comment: 12-15 drinks/week     Drug use: No     Sexual activity: Yes   Other Topics Concern     Not on file   Social History Narrative    .     Social Determinants of Health     Financial Resource Strain: Not on file   Food Insecurity: Not on file   Transportation Needs: Not on file   Physical Activity: Not on file   Stress: Not on file   Social Connections: Not on file   Intimate Partner Violence: Not on file   Housing Stability: Not on file           Medications  Allergies   Current Outpatient Medications   Medication Sig Dispense Refill     apixaban ANTICOAGULANT (ELIQUIS) 5 MG tablet Take 1 tablet (5 mg) by mouth 2 times daily 60 tablet 4     cholecalciferol, vitamin D3, 5,000 unit Tab [CHOLECALCIFEROL, VITAMIN D3, 5,000 UNIT TAB] Take 2,000 Units by mouth daily.                Allergies   Allergen Reactions     Terbinafine Hives          Lab Results    Chemistry/lipid CBC Cardiac Enzymes/BNP/TSH/INR   Recent Labs   Lab Test 07/11/22  1102   CHOL 193   HDL 46   *   TRIG 125     Recent Labs   Lab Test 07/11/22  1102 04/16/21  1212   * 141*     Recent Labs   Lab Test 11/08/22  0710 11/07/22  0917    136   POTASSIUM 4.3 4.6   CHLORIDE 103 99   CO2 27 27   ANIONGAP 6* 10   GLC 99 112*   BUN 7.9* 11.5   CR 1.01 1.01   JOSE 8.1* 9.0      CBC RESULTS: Recent Labs   Lab Test 11/08/22  0710   WBC 4.3   RBC 4.00*   HGB 12.8*   HCT 38.2*   MCV 96   MCH 32.0   MCHC 33.5   RDW 13.2         Recent Labs   Lab Test 04/08/22  0728 03/30/22  0748  08/16/19  1516   TROPONINI 0.02 0.03 <0.01     No results for input(s): BNP, NTBNPI, NTBNP in the last 16117 hours.  Recent Labs   Lab Test 04/08/22  0728   TSH 2.95     No results for input(s): INR in the last 59186 hours.

## 2022-11-09 NOTE — PATIENT INSTRUCTIONS
Rogers Martinez,    It was a pleasure to see you today at the [facility].     My recommendations after this visit include:    Stop taking flecainide and diltiazem    Continue Eliquis for at least 3 months after ablation    Follow up with Dr. Giraldo in 6 weeks    Sari Avelar, CNP  [facility], Electrophysiology  [phone]  EP nurses [phone]

## 2022-11-12 LAB
BACTERIA BLD CULT: NO GROWTH
BACTERIA BLD CULT: NO GROWTH

## 2022-12-08 ENCOUNTER — OFFICE VISIT (OUTPATIENT)
Dept: CARDIOLOGY | Facility: CLINIC | Age: 62
End: 2022-12-08
Attending: NURSE PRACTITIONER
Payer: COMMERCIAL

## 2022-12-08 VITALS
HEART RATE: 62 BPM | WEIGHT: 226 LBS | HEIGHT: 75 IN | BODY MASS INDEX: 28.1 KG/M2 | SYSTOLIC BLOOD PRESSURE: 134 MMHG | RESPIRATION RATE: 16 BRPM | DIASTOLIC BLOOD PRESSURE: 76 MMHG

## 2022-12-08 DIAGNOSIS — I48.0 PAROXYSMAL ATRIAL FIBRILLATION (H): ICD-10-CM

## 2022-12-08 PROCEDURE — 99213 OFFICE O/P EST LOW 20 MIN: CPT | Performed by: NURSE PRACTITIONER

## 2022-12-08 NOTE — LETTER
"12/8/2022    Ahsan Andrew MD  [address]  [facility] [address]    RE: Rogers Martinez       Dear Colleague,     I had the pleasure of seeing Rogers Martinez in the [facility].    HEART CARE ELECTROPHYSIOLOGY NOTE      [facility]  [phone]      Assessment/Recommendations   Assessment/Plan:  1.  Paroxysmal Atrial Fibrillation:  No symptomatology or evidence of AF recurrence.  He remains off membrane active antiarrhythmic medications.    He was reassured that atrial fibrillation is not life-threatening, but carries an increased risk of stroke.  However, he has a [id number] score of 0.  HAS-BLED score of 0.   Discontinue Eliquis.  Start aspirin 81 mg daily for stroke prophylaxis.    Follow-up 1 year post PVI     History of Present Illness/Subjective    HPI: Rogers Martinez is a 62 year old male who comes in for EP follow up of atrial fibrillation and history and physical prior to pulmonary vein isolation ablation.  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    He was diagnosed with atrial fibrillation on 10/16/2016.  Symptoms consist of palpitations, fatigue, dyspnea exertion, lightheadedness, and a \"roller coaster feeling\" in his abdomen.  He failed antiarrhythmic therapy with flecainide.  He underwent pulmonary vein isolation ablation with Dr. Marquez on 7/12/2017 with RF to all 4 pulmonary veins.  He had recurrence of A. fib in August 2019 for which he underwent early cardioversion in the ED.  He had recurrent AF on 3/30/2022 and 4/8/2022 after which he was started on flecainide 150 mg twice daily in addition to metoprolol succinate 25 mg daily.  Metoprolol was discontinued due to side effects and he was started on diltiazem 120 mg daily.  He underwent redo ablation on 9/28/2022 with Dr. Giraldo with RF reisolation of the pulmonary veins.  " "He is on Eliquis for stroke prophylaxis, started 5 days prior to ablation.    He was briefly hospitalized on 11/7/2022 for ileitis and possible partial small bowel obstruction which resolved without intervention.    Rogers states that he is feeling well.  He has not had any A. fib.  He denies chest discomfort, palpitations, abdominal fullness/bloating or peripheral edema, shortness of breath, paroxysmal nocturnal dyspnea, orthopnea, lightheadedness, dizziness, pre-syncope, or syncope.    Cardiographics (EKG personally reviewed):  EKG done 9/28/2022 shows sinus rhythm at 60 bpm, QRS 88 ms, QT/QTc 448/448 ms.  EKG done 4/8/2022 shows atrial fibrillation with rapid ventricular response at 149 bpm.  Subsequent EKG shows sinus rhythm at 62 bpm.    Stress echo done 4/27/2022:  Patient exercised 9:55 seconds on a Mannie protocol stopping secondary to  fatigue  Appropriate heart rate and blood pressure response to exercise.Patient  achieved 84% of maximum predicted heart rate  Exercise ECG is negative for ischemia at the workload achieved  Rest Echo:Definity utilized.Normal LV function.LVEF estimate at 60-65%  Post Exercise Echo:Appropriate augmentation of systolic function without  observed wall motion abnormality  Conclusions: Exercise stress echocardiogram is negative for ischemia at the  workload achieved.Maximum heart rate achieved was mildly reduced at 84%.Post  exercise images are mildly challenging.    CTA pulmonary veins done 9/28/2017:  esophagus appears to travel behind the left   atrial near the left inferior pulmonary vein    I have reviewed and updated the patient's Past Medical History, Social History, Family History and Medication List.  Outside records personally reviewed.     Physical Examination  Review of Systems   Vitals: /76 (BP Location: Left arm, Patient Position: Sitting, Cuff Size: Adult Regular)   Pulse 62   Resp 16   Ht 1.905 m (6' 3\")   Wt 102.5 kg (226 lb)   BMI 28.25 kg/m    BMI= " Body mass index is 28.25 kg/m .  Wt Readings from Last 3 Encounters:   12/08/22 102.5 kg (226 lb)   11/09/22 101.6 kg (224 lb)   11/07/22 98.4 kg (217 lb)       General Appearance:   Alert, well-appearing and in no acute distress.   HEENT: Atraumatic, normocephalic.  No scleral icterus, normal conjunctivae, EOMs intact, PERRL.  Wearing a mask.   Chest/Lungs:   Chest symmetric, spine straight.  Respirations unlabored.  Lungs are clear to auscultation.   Cardiovascular:   Regular rate and rhythm.  Normal first and second heart sounds with no murmurs, rubs, or gallops; radial and posterior tibial pulses are intact, No edema.   Abdomen:  Soft, nondistended, bowel sounds present.   Extremities: No cyanosis or clubbing.   Musculoskeletal: Moves all extremities.     Skin: Warm, dry, intact.    Neurologic: Mood and affect are appropriate.  Alert and oriented to person, place, time, and situation.     ROS: 10 point ROS neg other than the symptoms noted above in the HPI.         Medical History  Surgical History Family History Social History   Past Medical History:   Diagnosis Date     Paroxysmal atrial fibrillation (H)      Past Surgical History:   Procedure Laterality Date     APPENDECTOMY       BOWEL RESECTION      with appy, age 9     CARDIOVERSION      10/16/16, 4/16/17, 8/16/19     [place] EPHYS EVL TRNSPTL TX ATRIAL FIB ISOLAT PULM VEIN Left 7/12/2017    Procedure: EP Ablation PVI  RF to 4 PV;  Surgeon: Celina Marquez MD;      Family History   Problem Relation Age of Onset     Atrial fibrillation Mother      Cerebrovascular Disease Mother      Aortic aneurysm Father      Atrial fibrillation Sister      Atrial fibrillation Brother         Social History     Socioeconomic History     Marital status:      Spouse name: Not on file     Number of children: Not on file     Years of education: Not on file     Highest education level: Not on file   Occupational History     Not on file   Tobacco Use     Smoking  status: Former     Types: Cigarettes, Cigars     Smokeless tobacco: Former     Types: Chew   Substance and Sexual Activity     Alcohol use: Yes     Comment: 12-15 drinks/week     Drug use: No     Sexual activity: Yes   Other Topics Concern     Not on file   Social History Narrative    .     Social Determinants of Health     Financial Resource Strain: Not on file   Food Insecurity: Not on file   Transportation Needs: Not on file   Physical Activity: Not on file   Stress: Not on file   Social Connections: Not on file   Intimate Partner Violence: Not on file   Housing Stability: Not on file           Medications  Allergies   Current Outpatient Medications   Medication Sig Dispense Refill     aspirin (ASA) 81 MG EC tablet Take 1 tablet (81 mg) by mouth daily       cholecalciferol 50 MCG (2000 UT) CAPS [CHOLECALCIFEROL, VITAMIN D3, 5,000 UNIT TAB] Take 2,000 Units by mouth daily.                No Known Allergies       Lab Results    Chemistry/lipid CBC Cardiac Enzymes/BNP/TSH/INR   Recent Labs   Lab Test 07/11/22  1102   CHOL 193   HDL 46   *   TRIG 125     Recent Labs   Lab Test 07/11/22  1102 04/16/21  1212   * 141*     Recent Labs   Lab Test 11/08/22  0710 11/07/22  0917    136   POTASSIUM 4.3 4.6   CHLORIDE 103 99   CO2 27 27   ANIONGAP 6* 10   GLC 99 112*   BUN 7.9* 11.5   CR 1.01 1.01   JOSE 8.1* 9.0      CBC RESULTS: Recent Labs   Lab Test 11/08/22  0710   WBC 4.3   RBC 4.00*   HGB 12.8*   HCT 38.2*   MCV 96   MCH 32.0   MCHC 33.5   RDW 13.2         Recent Labs   Lab Test 04/08/22  0728 03/30/22  0748 08/16/19  1516   TROPONINI 0.02 0.03 <0.01     No results for input(s): BNP, NTBNPI, NTBNP in the last 16605 hours.  Recent Labs   Lab Test 04/08/22  0728   TSH 2.95     No results for input(s): INR in the last 15064 hours.               Thank you for allowing me to participate in the care of your patient.      Sincerely,     FABBY Salinas CNP     [facility]  [facility]  cc:   FABBY Salinas CNP  [address], [address]  [place] [address]

## 2022-12-08 NOTE — PROGRESS NOTES
"  HEART CARE ELECTROPHYSIOLOGY NOTE      [facility]  [phone]      Assessment/Recommendations   Assessment/Plan:  1.  Paroxysmal Atrial Fibrillation:  No symptomatology or evidence of AF recurrence.  He remains off membrane active antiarrhythmic medications.    He was reassured that atrial fibrillation is not life-threatening, but carries an increased risk of stroke.  However, he has a [id number] score of 0.  HAS-BLED score of 0.   Discontinue Eliquis.  Start aspirin 81 mg daily for stroke prophylaxis.    Follow-up 1 year post PVI     History of Present Illness/Subjective    HPI: Rogers Martinez is a 62 year old male who comes in for EP follow up of atrial fibrillation and history and physical prior to pulmonary vein isolation ablation.  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    He was diagnosed with atrial fibrillation on 10/16/2016.  Symptoms consist of palpitations, fatigue, dyspnea exertion, lightheadedness, and a \"roller coaster feeling\" in his abdomen.  He failed antiarrhythmic therapy with flecainide.  He underwent pulmonary vein isolation ablation with Dr. Marquez on 7/12/2017 with RF to all 4 pulmonary veins.  He had recurrence of A. fib in August 2019 for which he underwent early cardioversion in the ED.  He had recurrent AF on 3/30/2022 and 4/8/2022 after which he was started on flecainide 150 mg twice daily in addition to metoprolol succinate 25 mg daily.  Metoprolol was discontinued due to side effects and he was started on diltiazem 120 mg daily.  He underwent redo ablation on 9/28/2022 with Dr. Giraldo with RF reisolation of the pulmonary veins.  He is on Eliquis for stroke prophylaxis, started 5 days prior to ablation.    He was briefly hospitalized on 11/7/2022 for ileitis and possible partial small bowel obstruction which resolved without " "intervention.    Rogers states that he is feeling well.  He has not had any A. fib.  He denies chest discomfort, palpitations, abdominal fullness/bloating or peripheral edema, shortness of breath, paroxysmal nocturnal dyspnea, orthopnea, lightheadedness, dizziness, pre-syncope, or syncope.    Cardiographics (EKG personally reviewed):  EKG done 9/28/2022 shows sinus rhythm at 60 bpm, QRS 88 ms, QT/QTc 448/448 ms.  EKG done 4/8/2022 shows atrial fibrillation with rapid ventricular response at 149 bpm.  Subsequent EKG shows sinus rhythm at 62 bpm.    Stress echo done 4/27/2022:  Patient exercised 9:55 seconds on a Mannie protocol stopping secondary to  fatigue  Appropriate heart rate and blood pressure response to exercise.Patient  achieved 84% of maximum predicted heart rate  Exercise ECG is negative for ischemia at the workload achieved  Rest Echo:Definity utilized.Normal LV function.LVEF estimate at 60-65%  Post Exercise Echo:Appropriate augmentation of systolic function without  observed wall motion abnormality  Conclusions: Exercise stress echocardiogram is negative for ischemia at the  workload achieved.Maximum heart rate achieved was mildly reduced at 84%.Post  exercise images are mildly challenging.    CTA pulmonary veins done 9/28/2017:  esophagus appears to travel behind the left   atrial near the left inferior pulmonary vein    I have reviewed and updated the patient's Past Medical History, Social History, Family History and Medication List.  Outside records personally reviewed.     Physical Examination  Review of Systems   Vitals: /76 (BP Location: Left arm, Patient Position: Sitting, Cuff Size: Adult Regular)   Pulse 62   Resp 16   Ht 1.905 m (6' 3\")   Wt 102.5 kg (226 lb)   BMI 28.25 kg/m    BMI= Body mass index is 28.25 kg/m .  Wt Readings from Last 3 Encounters:   12/08/22 102.5 kg (226 lb)   11/09/22 101.6 kg (224 lb)   11/07/22 98.4 kg (217 lb)       General Appearance:   Alert, " well-appearing and in no acute distress.   HEENT: Atraumatic, normocephalic.  No scleral icterus, normal conjunctivae, EOMs intact, PERRL.  Wearing a mask.   Chest/Lungs:   Chest symmetric, spine straight.  Respirations unlabored.  Lungs are clear to auscultation.   Cardiovascular:   Regular rate and rhythm.  Normal first and second heart sounds with no murmurs, rubs, or gallops; radial and posterior tibial pulses are intact, No edema.   Abdomen:  Soft, nondistended, bowel sounds present.   Extremities: No cyanosis or clubbing.   Musculoskeletal: Moves all extremities.     Skin: Warm, dry, intact.    Neurologic: Mood and affect are appropriate.  Alert and oriented to person, place, time, and situation.     ROS: 10 point ROS neg other than the symptoms noted above in the HPI.         Medical History  Surgical History Family History Social History   Past Medical History:   Diagnosis Date     Paroxysmal atrial fibrillation (H)      Past Surgical History:   Procedure Laterality Date     APPENDECTOMY       BOWEL RESECTION      with appy, age 9     CARDIOVERSION      10/16/16, 4/16/17, 8/16/19     [place] EPHYS EVL TRNSPTL [place] ATRIAL FIB ISOLAT PULM VEIN Left 7/12/2017    Procedure: EP Ablation PVI  RF to 4 PV;  Surgeon: Celina Marquez MD;      Family History   Problem Relation Age of Onset     Atrial fibrillation Mother      Cerebrovascular Disease Mother      Aortic aneurysm Father      Atrial fibrillation Sister      Atrial fibrillation Brother         Social History     Socioeconomic History     Marital status:      Spouse name: Not on file     Number of children: Not on file     Years of education: Not on file     Highest education level: Not on file   Occupational History     Not on file   Tobacco Use     Smoking status: Former     Types: Cigarettes, Cigars     Smokeless tobacco: Former     Types: Chew   Substance and Sexual Activity     Alcohol use: Yes     Comment: 12-15 drinks/week     Drug use: No      Sexual activity: Yes   Other Topics Concern     Not on file   Social History Narrative    .     Social Determinants of Health     Financial Resource Strain: Not on file   Food Insecurity: Not on file   Transportation Needs: Not on file   Physical Activity: Not on file   Stress: Not on file   Social Connections: Not on file   Intimate Partner Violence: Not on file   Housing Stability: Not on file           Medications  Allergies   Current Outpatient Medications   Medication Sig Dispense Refill     aspirin (ASA) 81 MG EC tablet Take 1 tablet (81 mg) by mouth daily       cholecalciferol 50 MCG (2000 UT) CAPS [CHOLECALCIFEROL, VITAMIN D3, 5,000 UNIT TAB] Take 2,000 Units by mouth daily.                No Known Allergies       Lab Results    Chemistry/lipid CBC Cardiac Enzymes/BNP/TSH/INR   Recent Labs   Lab Test 07/11/22  1102   CHOL 193   HDL 46   *   TRIG 125     Recent Labs   Lab Test 07/11/22  1102 04/16/21  1212   * 141*     Recent Labs   Lab Test 11/08/22  0710 11/07/22  0917    136   POTASSIUM 4.3 4.6   CHLORIDE 103 99   CO2 27 27   ANIONGAP 6* 10   GLC 99 112*   BUN 7.9* 11.5   CR 1.01 1.01   JOSE 8.1* 9.0      CBC RESULTS: Recent Labs   Lab Test 11/08/22  0710   WBC 4.3   RBC 4.00*   HGB 12.8*   HCT 38.2*   MCV 96   MCH 32.0   MCHC 33.5   RDW 13.2         Recent Labs   Lab Test 04/08/22  0728 03/30/22  0748 08/16/19  1516   TROPONINI 0.02 0.03 <0.01     No results for input(s): BNP, NTBNPI, NTBNP in the last 35369 hours.  Recent Labs   Lab Test 04/08/22  0728   TSH 2.95     No results for input(s): INR in the last 62171 hours.

## 2022-12-08 NOTE — PATIENT INSTRUCTIONS
Rogers Martinez,    It was a pleasure to see you today at the [facility].     My recommendations after this visit include:    Continue Eliquis until 12/28/2022, then stop taking and start aspirin 81 mg daily    Call if you have recurrent A fib    Follow up in September 2023 (1 year after ablation)    Sari Avelar, [facility], Electrophysiology  [phone]  EP nurses [phone]

## 2023-08-20 ENCOUNTER — HEALTH MAINTENANCE LETTER (OUTPATIENT)
Age: 63
End: 2023-08-20

## 2023-09-02 ENCOUNTER — LAB REQUISITION (OUTPATIENT)
Dept: LAB | Facility: CLINIC | Age: 63
End: 2023-09-02

## 2023-09-02 DIAGNOSIS — R51.9 HEADACHE, UNSPECIFIED: ICD-10-CM

## 2023-09-02 DIAGNOSIS — M25.50 PAIN IN UNSPECIFIED JOINT: ICD-10-CM

## 2023-09-02 LAB
ALBUMIN SERPL BCG-MCNC: 4.7 G/DL (ref 3.5–5.2)
ALP SERPL-CCNC: 102 U/L (ref 40–129)
ALT SERPL W P-5'-P-CCNC: 36 U/L (ref 0–70)
ANION GAP SERPL CALCULATED.3IONS-SCNC: 16 MMOL/L (ref 7–15)
AST SERPL W P-5'-P-CCNC: 33 U/L (ref 0–45)
BILIRUB SERPL-MCNC: 2.1 MG/DL
BUN SERPL-MCNC: 7.2 MG/DL (ref 8–23)
CALCIUM SERPL-MCNC: 9.1 MG/DL (ref 8.8–10.2)
CHLORIDE SERPL-SCNC: 102 MMOL/L (ref 98–107)
CK SERPL-CCNC: 272 U/L (ref 39–308)
CREAT SERPL-MCNC: 0.87 MG/DL (ref 0.67–1.17)
DEPRECATED HCO3 PLAS-SCNC: 20 MMOL/L (ref 22–29)
ERYTHROCYTE [SEDIMENTATION RATE] IN BLOOD BY WESTERGREN METHOD: 8 MM/HR (ref 0–20)
GFR SERPL CREATININE-BSD FRML MDRD: >90 ML/MIN/1.73M2
GLUCOSE SERPL-MCNC: 93 MG/DL (ref 70–99)
POTASSIUM SERPL-SCNC: 4.4 MMOL/L (ref 3.4–5.3)
PROT SERPL-MCNC: 7.1 G/DL (ref 6.4–8.3)
SODIUM SERPL-SCNC: 138 MMOL/L (ref 136–145)

## 2023-09-02 PROCEDURE — 80053 COMPREHEN METABOLIC PANEL: CPT | Performed by: PHYSICIAN ASSISTANT

## 2023-09-02 PROCEDURE — 82550 ASSAY OF CK (CPK): CPT | Performed by: PHYSICIAN ASSISTANT

## 2023-09-02 PROCEDURE — 86618 LYME DISEASE ANTIBODY: CPT | Performed by: PHYSICIAN ASSISTANT

## 2023-09-02 PROCEDURE — 85652 RBC SED RATE AUTOMATED: CPT | Performed by: PHYSICIAN ASSISTANT

## 2023-09-02 PROCEDURE — 87635 SARS-COV-2 COVID-19 AMP PRB: CPT | Performed by: PHYSICIAN ASSISTANT

## 2023-09-03 LAB — SARS-COV-2 RNA RESP QL NAA+PROBE: NEGATIVE

## 2023-09-04 LAB — B BURGDOR IGG+IGM SER QL: 0.28

## 2023-09-15 ENCOUNTER — LAB REQUISITION (OUTPATIENT)
Dept: LAB | Facility: CLINIC | Age: 63
End: 2023-09-15

## 2023-09-15 DIAGNOSIS — Z12.5 ENCOUNTER FOR SCREENING FOR MALIGNANT NEOPLASM OF PROSTATE: ICD-10-CM

## 2023-09-15 DIAGNOSIS — Z13.220 ENCOUNTER FOR SCREENING FOR LIPOID DISORDERS: ICD-10-CM

## 2023-09-15 PROCEDURE — 80061 LIPID PANEL: CPT | Performed by: FAMILY MEDICINE

## 2023-09-15 PROCEDURE — G0103 PSA SCREENING: HCPCS | Performed by: FAMILY MEDICINE

## 2023-09-16 LAB
CHOLEST SERPL-MCNC: 172 MG/DL
HDLC SERPL-MCNC: 39 MG/DL
LDLC SERPL CALC-MCNC: 112 MG/DL
NONHDLC SERPL-MCNC: 133 MG/DL
PSA SERPL DL<=0.01 NG/ML-MCNC: 2.32 NG/ML (ref 0–4.5)
TRIGL SERPL-MCNC: 106 MG/DL

## 2024-10-13 ENCOUNTER — HEALTH MAINTENANCE LETTER (OUTPATIENT)
Age: 64
End: 2024-10-13

## 2024-12-12 ENCOUNTER — LAB REQUISITION (OUTPATIENT)
Dept: LAB | Facility: CLINIC | Age: 64
End: 2024-12-12
Payer: MEDICARE

## 2024-12-12 PROCEDURE — 80053 COMPREHEN METABOLIC PANEL: CPT | Mod: ORL | Performed by: FAMILY MEDICINE

## 2024-12-12 PROCEDURE — G0103 PSA SCREENING: HCPCS | Mod: ORL | Performed by: FAMILY MEDICINE

## 2024-12-12 PROCEDURE — 80061 LIPID PANEL: CPT | Mod: ORL | Performed by: FAMILY MEDICINE

## 2024-12-13 LAB
ALBUMIN SERPL BCG-MCNC: 4.4 G/DL (ref 3.5–5.2)
ALP SERPL-CCNC: 89 U/L (ref 40–150)
ALT SERPL W P-5'-P-CCNC: 33 U/L (ref 0–70)
ANION GAP SERPL CALCULATED.3IONS-SCNC: 9 MMOL/L (ref 7–15)
AST SERPL W P-5'-P-CCNC: 28 U/L (ref 0–45)
BILIRUB SERPL-MCNC: 2 MG/DL
BUN SERPL-MCNC: 6.9 MG/DL (ref 8–23)
CALCIUM SERPL-MCNC: 8.9 MG/DL (ref 8.8–10.4)
CHLORIDE SERPL-SCNC: 104 MMOL/L (ref 98–107)
CHOLEST SERPL-MCNC: 215 MG/DL
CREAT SERPL-MCNC: 0.89 MG/DL (ref 0.67–1.17)
EGFRCR SERPLBLD CKD-EPI 2021: >90 ML/MIN/1.73M2
FASTING STATUS PATIENT QL REPORTED: ABNORMAL
FASTING STATUS PATIENT QL REPORTED: ABNORMAL
GLUCOSE SERPL-MCNC: 97 MG/DL (ref 70–99)
HCO3 SERPL-SCNC: 25 MMOL/L (ref 22–29)
HDLC SERPL-MCNC: 48 MG/DL
LDLC SERPL CALC-MCNC: 145 MG/DL
NONHDLC SERPL-MCNC: 167 MG/DL
POTASSIUM SERPL-SCNC: 4.5 MMOL/L (ref 3.4–5.3)
PROT SERPL-MCNC: 7.3 G/DL (ref 6.4–8.3)
PSA SERPL DL<=0.01 NG/ML-MCNC: 2.79 NG/ML (ref 0–4.5)
SODIUM SERPL-SCNC: 138 MMOL/L (ref 135–145)
TRIGL SERPL-MCNC: 108 MG/DL

## 2025-03-28 ENCOUNTER — LAB REQUISITION (OUTPATIENT)
Dept: LAB | Facility: CLINIC | Age: 65
End: 2025-03-28
Payer: MEDICARE

## 2025-03-28 DIAGNOSIS — E78.49 OTHER HYPERLIPIDEMIA: ICD-10-CM

## 2025-03-28 LAB
CHOLEST SERPL-MCNC: 137 MG/DL
FASTING STATUS PATIENT QL REPORTED: NO
HDLC SERPL-MCNC: 41 MG/DL
LDLC SERPL CALC-MCNC: 68 MG/DL
NONHDLC SERPL-MCNC: 96 MG/DL
TRIGL SERPL-MCNC: 141 MG/DL

## 2025-03-28 PROCEDURE — 80061 LIPID PANEL: CPT | Mod: ORL | Performed by: FAMILY MEDICINE

## 2025-07-11 ENCOUNTER — HOSPITAL ENCOUNTER (INPATIENT)
Facility: HOSPITAL | Age: 65
LOS: 3 days | Discharge: HOME | End: 2025-07-14
Attending: EMERGENCY MEDICINE
Payer: MEDICARE

## 2025-07-11 ENCOUNTER — APPOINTMENT (OUTPATIENT)
Dept: RADIOLOGY | Facility: HOSPITAL | Age: 65
End: 2025-07-11
Attending: INTERNAL MEDICINE
Payer: MEDICARE

## 2025-07-11 ENCOUNTER — APPOINTMENT (OUTPATIENT)
Dept: CT IMAGING | Facility: HOSPITAL | Age: 65
End: 2025-07-11
Attending: EMERGENCY MEDICINE
Payer: MEDICARE

## 2025-07-11 DIAGNOSIS — K56.609 SBO (SMALL BOWEL OBSTRUCTION) (H): ICD-10-CM

## 2025-07-11 LAB
ALBUMIN SERPL BCG-MCNC: 4.6 G/DL (ref 3.5–5.2)
ALBUMIN UR-MCNC: NEGATIVE MG/DL
ALP SERPL-CCNC: 124 U/L (ref 40–150)
ALT SERPL W P-5'-P-CCNC: 39 U/L (ref 0–70)
ANION GAP SERPL CALCULATED.3IONS-SCNC: 13 MMOL/L (ref 7–15)
APPEARANCE UR: CLEAR
AST SERPL W P-5'-P-CCNC: 29 U/L (ref 0–45)
BILIRUB DIRECT SERPL-MCNC: 0.54 MG/DL (ref 0–0.3)
BILIRUB SERPL-MCNC: 2.4 MG/DL
BILIRUB UR QL STRIP: NEGATIVE
BUN SERPL-MCNC: 8.9 MG/DL (ref 8–23)
CALCIUM SERPL-MCNC: 9.5 MG/DL (ref 8.8–10.4)
CHLORIDE SERPL-SCNC: 102 MMOL/L (ref 98–107)
COLOR UR AUTO: ABNORMAL
CREAT SERPL-MCNC: 0.91 MG/DL (ref 0.67–1.17)
EGFRCR SERPLBLD CKD-EPI 2021: >90 ML/MIN/1.73M2
ERYTHROCYTE [DISTWIDTH] IN BLOOD BY AUTOMATED COUNT: 13.3 % (ref 10–15)
GLUCOSE SERPL-MCNC: 126 MG/DL (ref 70–99)
GLUCOSE UR STRIP-MCNC: NEGATIVE MG/DL
HCO3 SERPL-SCNC: 25 MMOL/L (ref 22–29)
HCT VFR BLD AUTO: 46.7 % (ref 40–53)
HGB BLD-MCNC: 16 G/DL (ref 13.3–17.7)
HGB UR QL STRIP: NEGATIVE
KETONES UR STRIP-MCNC: NEGATIVE MG/DL
LEUKOCYTE ESTERASE UR QL STRIP: NEGATIVE
LIPASE SERPL-CCNC: 25 U/L (ref 13–60)
MCH RBC QN AUTO: 31.5 PG (ref 26.5–33)
MCHC RBC AUTO-ENTMCNC: 34.3 G/DL (ref 31.5–36.5)
MCV RBC AUTO: 92 FL (ref 78–100)
MUCOUS THREADS #/AREA URNS LPF: PRESENT /LPF
NITRATE UR QL: NEGATIVE
PH UR STRIP: 6 [PH] (ref 5–7)
PLATELET # BLD AUTO: 273 10E3/UL (ref 150–450)
POTASSIUM SERPL-SCNC: 4.3 MMOL/L (ref 3.4–5.3)
PROT SERPL-MCNC: 8 G/DL (ref 6.4–8.3)
RBC # BLD AUTO: 5.08 10E6/UL (ref 4.4–5.9)
RBC URINE: <1 /HPF
SODIUM SERPL-SCNC: 140 MMOL/L (ref 135–145)
SP GR UR STRIP: 1 (ref 1–1.03)
UROBILINOGEN UR STRIP-MCNC: NORMAL MG/DL
WBC # BLD AUTO: 14.3 10E3/UL (ref 4–11)
WBC URINE: <1 /HPF

## 2025-07-11 PROCEDURE — 258N000003 HC RX IP 258 OP 636: Performed by: EMERGENCY MEDICINE

## 2025-07-11 PROCEDURE — 96374 THER/PROPH/DIAG INJ IV PUSH: CPT | Mod: 59

## 2025-07-11 PROCEDURE — 120N000001 HC R&B MED SURG/OB

## 2025-07-11 PROCEDURE — 36415 COLL VENOUS BLD VENIPUNCTURE: CPT | Performed by: EMERGENCY MEDICINE

## 2025-07-11 PROCEDURE — 999N000065 XR ABDOMEN PORT 1 VIEW

## 2025-07-11 PROCEDURE — 99285 EMERGENCY DEPT VISIT HI MDM: CPT | Mod: 25

## 2025-07-11 PROCEDURE — 74177 CT ABD & PELVIS W/CONTRAST: CPT

## 2025-07-11 PROCEDURE — 82310 ASSAY OF CALCIUM: CPT | Performed by: EMERGENCY MEDICINE

## 2025-07-11 PROCEDURE — 250N000011 HC RX IP 250 OP 636: Performed by: INTERNAL MEDICINE

## 2025-07-11 PROCEDURE — 82248 BILIRUBIN DIRECT: CPT | Performed by: EMERGENCY MEDICINE

## 2025-07-11 PROCEDURE — 99221 1ST HOSP IP/OBS SF/LOW 40: CPT

## 2025-07-11 PROCEDURE — 85018 HEMOGLOBIN: CPT | Performed by: EMERGENCY MEDICINE

## 2025-07-11 PROCEDURE — 93005 ELECTROCARDIOGRAM TRACING: CPT | Performed by: INTERNAL MEDICINE

## 2025-07-11 PROCEDURE — 250N000009 HC RX 250

## 2025-07-11 PROCEDURE — 96375 TX/PRO/DX INJ NEW DRUG ADDON: CPT

## 2025-07-11 PROCEDURE — 250N000011 HC RX IP 250 OP 636: Performed by: EMERGENCY MEDICINE

## 2025-07-11 PROCEDURE — 96361 HYDRATE IV INFUSION ADD-ON: CPT

## 2025-07-11 PROCEDURE — 258N000003 HC RX IP 258 OP 636: Performed by: INTERNAL MEDICINE

## 2025-07-11 PROCEDURE — 81003 URINALYSIS AUTO W/O SCOPE: CPT | Performed by: EMERGENCY MEDICINE

## 2025-07-11 PROCEDURE — 255N000002 HC RX 255 OP 636

## 2025-07-11 PROCEDURE — 83690 ASSAY OF LIPASE: CPT | Performed by: EMERGENCY MEDICINE

## 2025-07-11 PROCEDURE — 99223 1ST HOSP IP/OBS HIGH 75: CPT | Mod: AI | Performed by: INTERNAL MEDICINE

## 2025-07-11 RX ORDER — PROCHLORPERAZINE MALEATE 5 MG/1
5 TABLET ORAL EVERY 6 HOURS PRN
Status: DISCONTINUED | OUTPATIENT
Start: 2025-07-11 | End: 2025-07-14 | Stop reason: HOSPADM

## 2025-07-11 RX ORDER — NALOXONE HYDROCHLORIDE 0.4 MG/ML
0.2 INJECTION, SOLUTION INTRAMUSCULAR; INTRAVENOUS; SUBCUTANEOUS
Status: DISCONTINUED | OUTPATIENT
Start: 2025-07-11 | End: 2025-07-14 | Stop reason: HOSPADM

## 2025-07-11 RX ORDER — ONDANSETRON 2 MG/ML
4 INJECTION INTRAMUSCULAR; INTRAVENOUS EVERY 6 HOURS PRN
Status: DISCONTINUED | OUTPATIENT
Start: 2025-07-11 | End: 2025-07-14 | Stop reason: HOSPADM

## 2025-07-11 RX ORDER — HYDROMORPHONE HCL IN WATER/PF 6 MG/30 ML
0.2 PATIENT CONTROLLED ANALGESIA SYRINGE INTRAVENOUS
Refills: 0 | Status: DISCONTINUED | OUTPATIENT
Start: 2025-07-11 | End: 2025-07-14 | Stop reason: HOSPADM

## 2025-07-11 RX ORDER — SODIUM CHLORIDE 9 MG/ML
INJECTION, SOLUTION INTRAVENOUS CONTINUOUS
Status: DISCONTINUED | OUTPATIENT
Start: 2025-07-11 | End: 2025-07-13

## 2025-07-11 RX ORDER — LIDOCAINE 40 MG/G
CREAM TOPICAL
Status: DISCONTINUED | OUTPATIENT
Start: 2025-07-11 | End: 2025-07-14 | Stop reason: HOSPADM

## 2025-07-11 RX ORDER — AMOXICILLIN 250 MG
2 CAPSULE ORAL 2 TIMES DAILY PRN
Status: DISCONTINUED | OUTPATIENT
Start: 2025-07-11 | End: 2025-07-14 | Stop reason: HOSPADM

## 2025-07-11 RX ORDER — PROCHLORPERAZINE 25 MG
12.5 SUPPOSITORY, RECTAL RECTAL EVERY 12 HOURS PRN
Status: DISCONTINUED | OUTPATIENT
Start: 2025-07-11 | End: 2025-07-14 | Stop reason: HOSPADM

## 2025-07-11 RX ORDER — NALOXONE HYDROCHLORIDE 0.4 MG/ML
0.4 INJECTION, SOLUTION INTRAMUSCULAR; INTRAVENOUS; SUBCUTANEOUS
Status: DISCONTINUED | OUTPATIENT
Start: 2025-07-11 | End: 2025-07-14 | Stop reason: HOSPADM

## 2025-07-11 RX ORDER — HYDROMORPHONE HCL IN WATER/PF 6 MG/30 ML
0.4 PATIENT CONTROLLED ANALGESIA SYRINGE INTRAVENOUS
Refills: 0 | Status: DISCONTINUED | OUTPATIENT
Start: 2025-07-11 | End: 2025-07-14 | Stop reason: HOSPADM

## 2025-07-11 RX ORDER — MORPHINE SULFATE 4 MG/ML
4 INJECTION, SOLUTION INTRAMUSCULAR; INTRAVENOUS ONCE
Refills: 0 | Status: COMPLETED | OUTPATIENT
Start: 2025-07-11 | End: 2025-07-11

## 2025-07-11 RX ORDER — VITAMIN B COMPLEX
2000 TABLET ORAL EVERY MORNING
Status: DISCONTINUED | OUTPATIENT
Start: 2025-07-12 | End: 2025-07-14 | Stop reason: HOSPADM

## 2025-07-11 RX ORDER — AMOXICILLIN 250 MG
1 CAPSULE ORAL 2 TIMES DAILY PRN
Status: DISCONTINUED | OUTPATIENT
Start: 2025-07-11 | End: 2025-07-14 | Stop reason: HOSPADM

## 2025-07-11 RX ORDER — IOPAMIDOL 755 MG/ML
90 INJECTION, SOLUTION INTRAVASCULAR ONCE
Status: COMPLETED | OUTPATIENT
Start: 2025-07-11 | End: 2025-07-11

## 2025-07-11 RX ORDER — ASPIRIN 81 MG/1
81 TABLET ORAL EVERY MORNING
COMMUNITY

## 2025-07-11 RX ORDER — ACETAMINOPHEN 650 MG/1
650 SUPPOSITORY RECTAL EVERY 4 HOURS PRN
Status: DISCONTINUED | OUTPATIENT
Start: 2025-07-11 | End: 2025-07-14 | Stop reason: HOSPADM

## 2025-07-11 RX ORDER — ONDANSETRON 2 MG/ML
4 INJECTION INTRAMUSCULAR; INTRAVENOUS ONCE
Status: COMPLETED | OUTPATIENT
Start: 2025-07-11 | End: 2025-07-11

## 2025-07-11 RX ORDER — ATORVASTATIN CALCIUM 10 MG/1
1 TABLET, FILM COATED ORAL EVERY MORNING
COMMUNITY
Start: 2025-07-02

## 2025-07-11 RX ORDER — ASPIRIN 81 MG/1
81 TABLET ORAL EVERY MORNING
Status: DISCONTINUED | OUTPATIENT
Start: 2025-07-12 | End: 2025-07-14 | Stop reason: HOSPADM

## 2025-07-11 RX ORDER — ATORVASTATIN CALCIUM 10 MG/1
10 TABLET, FILM COATED ORAL EVERY MORNING
Status: DISCONTINUED | OUTPATIENT
Start: 2025-07-12 | End: 2025-07-14 | Stop reason: HOSPADM

## 2025-07-11 RX ORDER — CALCIUM CARBONATE 500 MG/1
1000 TABLET, CHEWABLE ORAL 4 TIMES DAILY PRN
Status: DISCONTINUED | OUTPATIENT
Start: 2025-07-11 | End: 2025-07-14 | Stop reason: HOSPADM

## 2025-07-11 RX ORDER — ONDANSETRON 4 MG/1
4 TABLET, ORALLY DISINTEGRATING ORAL EVERY 6 HOURS PRN
Status: DISCONTINUED | OUTPATIENT
Start: 2025-07-11 | End: 2025-07-14 | Stop reason: HOSPADM

## 2025-07-11 RX ORDER — ACETAMINOPHEN 325 MG/1
650 TABLET ORAL EVERY 4 HOURS PRN
Status: DISCONTINUED | OUTPATIENT
Start: 2025-07-11 | End: 2025-07-14 | Stop reason: HOSPADM

## 2025-07-11 RX ADMIN — IOPAMIDOL 90 ML: 755 INJECTION, SOLUTION INTRAVENOUS at 14:10

## 2025-07-11 RX ADMIN — HYDROMORPHONE HYDROCHLORIDE 0.2 MG: 0.2 INJECTION, SOLUTION INTRAMUSCULAR; INTRAVENOUS; SUBCUTANEOUS at 20:28

## 2025-07-11 RX ADMIN — SODIUM CHLORIDE 1000 ML: 0.9 INJECTION, SOLUTION INTRAVENOUS at 13:18

## 2025-07-11 RX ADMIN — SODIUM CHLORIDE: 0.9 INJECTION, SOLUTION INTRAVENOUS at 15:25

## 2025-07-11 RX ADMIN — DIATRIZOATE MEGLUMINE AND DIATRIZOATE SODIUM 150 ML: 660; 100 LIQUID ORAL; RECTAL at 19:59

## 2025-07-11 RX ADMIN — PROCHLORPERAZINE EDISYLATE 5 MG: 5 INJECTION INTRAMUSCULAR; INTRAVENOUS at 17:46

## 2025-07-11 RX ADMIN — HYDROMORPHONE HYDROCHLORIDE 0.2 MG: 0.2 INJECTION, SOLUTION INTRAMUSCULAR; INTRAVENOUS; SUBCUTANEOUS at 17:15

## 2025-07-11 RX ADMIN — MORPHINE SULFATE 4 MG: 4 INJECTION, SOLUTION INTRAMUSCULAR; INTRAVENOUS at 14:10

## 2025-07-11 RX ADMIN — ONDANSETRON 4 MG: 2 INJECTION, SOLUTION INTRAMUSCULAR; INTRAVENOUS at 13:19

## 2025-07-11 ASSESSMENT — COLUMBIA-SUICIDE SEVERITY RATING SCALE - C-SSRS
1. IN THE PAST MONTH, HAVE YOU WISHED YOU WERE DEAD OR WISHED YOU COULD GO TO SLEEP AND NOT WAKE UP?: NO
6. HAVE YOU EVER DONE ANYTHING, STARTED TO DO ANYTHING, OR PREPARED TO DO ANYTHING TO END YOUR LIFE?: NO
2. HAVE YOU ACTUALLY HAD ANY THOUGHTS OF KILLING YOURSELF IN THE PAST MONTH?: NO

## 2025-07-11 ASSESSMENT — ACTIVITIES OF DAILY LIVING (ADL)
ADLS_ACUITY_SCORE: 22
ADLS_ACUITY_SCORE: 45
ADLS_ACUITY_SCORE: 22
ADLS_ACUITY_SCORE: 45
ADLS_ACUITY_SCORE: 45
ADLS_ACUITY_SCORE: 22
ADLS_ACUITY_SCORE: 22
ADLS_ACUITY_SCORE: 45
ADLS_ACUITY_SCORE: 22
ADLS_ACUITY_SCORE: 22
ADLS_ACUITY_SCORE: 45

## 2025-07-11 NOTE — MEDICATION SCRIBE - ADMISSION MEDICATION HISTORY
Medication Scribe Admission Medication History    Admission medication history is complete. The information provided in this note is only as accurate as the sources available at the time of the update.    Information Source(s): Patient via in-person    Pertinent Information: Patient reports self management of medications.     Changes made to PTA medication list:  Added: Atorvastatin   Deleted: None  Changed: None    Allergies reviewed with patient and updates made in EHR: yes    Medication History Completed By: Ford Bermudez 7/11/2025 2:50 PM    PTA Med List   Medication Sig Last Dose/Taking    aspirin 81 MG EC tablet Take 81 mg by mouth every morning. 7/10/2025 Morning    atorvastatin (LIPITOR) 10 MG tablet Take 1 tablet by mouth every morning. 7/10/2025 Morning    cholecalciferol 50 MCG (2000 UT) CAPS Take 2,000 Units by mouth every morning. 7/10/2025 Morning

## 2025-07-11 NOTE — ED PROVIDER NOTES
EMERGENCY DEPARTMENT ENCOUnter      NAME: Rogers Martinez  AGE: 65 year old male  YOB: 1960  MRN: [id number]  EVALUATION DATE & TIME: 2025  1:00 PM    PCP: Ahsan Andrew    ED PROVIDER: Rip Foreman DO      Chief Complaint   Patient presents with    Abdominal Pain    Nausea & Vomiting         FINAL IMPRESSION:  1. SBO (small bowel obstruction) (H)          ED COURSE & MEDICAL DECISION MAKIN:06 PM I met with the patient to gather history and perform an initial exam.     The patient presented to the emergency department today complaining of abdominal pain.  He has a history of small bowel obstructions in the past and states that this feels similar.  He has some minimal abdominal tenderness on exam.  Laboratory testing today shows a mild leukocytosis.  CT confirms a high-grade small bowel obstruction.  The patient was informed of these findings and is comfortable with the plan for admission.        Medical Decision Making  I obtained history from Family Member/Significant Other  Admit.    MIPS (CTPE, Dental pain, Sinha, Sinusitis, Asthma/COPD, Head Trauma): Not Applicable    SEPSIS: None        At the conclusion of the encounter I discussed the results of all of the tests and the disposition. The questions were answered. The patient or family acknowledged understanding and was agreeable with the care plan.         MEDICATIONS GIVEN IN THE EMERGENCY:  Medications   sodium chloride 0.9% BOLUS 1,000 mL (1,000 mLs Intravenous $New Bag 25 1318)   ondansetron (ZOFRAN) injection 4 mg (4 mg Intravenous $Given 25 1319)   morphine (PF) injection 4 mg (4 mg Intravenous $Given 25 1410)   iopamidol (ISOVUE-370) solution 90 mL (90 mLs Intravenous $Given 25 1410)       =================================================================    HPI        Rogers Martinez is a 65 year old male with a pertinent history of bowel resection, SBO, GERD, and hyperlipidemia who presents to  this ED via walk-in for evaluation of nausea and vomit.     Patient reports nausea with vomiting for the last day. He cannot keep any liquids down. He has tried drinking water but it just comes back up like something is stuck down in his intestines. He feels some tenderness from his epigastric area down to his right groin area. He did get a bowel resection as a child and has had issues with nausea/vomiting since. He says he always has constant 5/10 pain from this but once in a while his pain will be 7-8/10. Patient overall feels dehydrated and is most concerned with not being able to keep liquids down.     Denies any other complaints at this time.            PAST MEDICAL HISTORY:  Past Medical History:   Diagnosis Date    Paroxysmal atrial fibrillation (H)        PAST SURGICAL HISTORY:  Past Surgical History:   Procedure Laterality Date    APPENDECTOMY      BOWEL RESECTION      with appy, age 9    CARDIOVERSION      10/16/16, 4/16/17, 8/16/19    [place] EPHYS EVL TRNSPTL [place] ATRIAL FIB ISOLAT PULM VEIN Left 7/12/2017    Procedure: EP Ablation PVI  RF to 4 PV;  Surgeon: Celina Marquez MD;            CURRENT MEDICATIONS:    aspirin 81 MG EC tablet  atorvastatin (LIPITOR) 10 MG tablet  cholecalciferol 50 MCG (2000 UT) CAPS        ALLERGIES:  No Known Allergies    FAMILY HISTORY:  Family History   Problem Relation Age of Onset    Atrial fibrillation Mother     Cerebrovascular Disease Mother     Aortic aneurysm Father     Atrial fibrillation Sister     Atrial fibrillation Brother        SOCIAL HISTORY:   Social History     Socioeconomic History    Marital status:      Spouse name: None    Number of children: None    Years of education: None    Highest education level: None   Tobacco Use    Smoking status: Former     Types: Cigarettes, Cigars    Smokeless tobacco: Former     Types: Chew   Substance and Sexual Activity    Alcohol use: Yes     Comment: 12-15 drinks/week    Drug use: No    Sexual activity: Yes    Social History Narrative    .       VITALS:  Patient Vitals for the past 24 hrs:   BP Temp Temp src Pulse Resp SpO2 Weight   07/11/25 1430 128/68 -- -- 64 -- 97 % --   07/11/25 1415 130/67 -- -- 67 -- 95 % --   07/11/25 1345 128/64 -- -- 64 -- 98 % --   07/11/25 1315 130/75 -- -- 67 -- 98 % --   07/11/25 1258 129/76 97.5  F (36.4  C) Temporal 75 18 99 % 102.5 kg (226 lb)       PHYSICAL EXAM    Constitutional:  Well developed, Well nourished,  HENT:  Normocephalic, Atraumatic, Oropharynx moist, Nose normal.   Eyes:  EOMI, Conjunctiva normal, No discharge.   Respiratory:  Normal breath sounds, No respiratory distress, No wheezing, No chest tenderness.   Cardiovascular:  Normal heart rate, Normal rhythm, No murmurs  GI:  Soft, Mild diffuse abdominal tenderness, No guarding, No CVA tenderness.   Musculoskeletal:  No tenderness to palpation or major deformities noted.   Extremities: No lower extremity edema.  Neurologic:  Alert & oriented x 3, No focal deficits noted.   Psychiatric:  Affect normal, Judgment normal, Mood normal.        LAB:  All pertinent labs reviewed and interpreted.  Results for orders placed or performed during the hospital encounter of 07/11/25              CBC with platelets   Result Value Ref Range    WBC Count 14.3 (H) 4.0 - 11.0 10e3/uL    RBC Count 5.08 4.40 - 5.90 10e6/uL    Hemoglobin 16.0 13.3 - 17.7 g/dL    Hematocrit 46.7 40.0 - 53.0 %    MCV 92 78 - 100 fL    MCH 31.5 26.5 - 33.0 pg    MCHC 34.3 31.5 - 36.5 g/dL    RDW 13.3 10.0 - 15.0 %    Platelet Count 273 150 - 450 10e3/uL   Basic metabolic panel   Result Value Ref Range    Sodium 140 135 - 145 mmol/L    Potassium 4.3 3.4 - 5.3 mmol/L    Chloride 102 98 - 107 mmol/L    Carbon Dioxide (CO2) 25 22 - 29 mmol/L    Anion Gap 13 7 - 15 mmol/L    Urea Nitrogen 8.9 8.0 - 23.0 mg/dL    Creatinine 0.91 0.67 - 1.17 mg/dL    GFR Estimate >90 >60 mL/min/1.73m2    Calcium 9.5 8.8 - 10.4 mg/dL    Glucose 126 (H) 70 - 99 mg/dL   Hepatic  function panel   Result Value Ref Range    Protein Total 8.0 6.4 - 8.3 g/dL    Albumin 4.6 3.5 - 5.2 g/dL    Bilirubin Total 2.4 (H) <=1.2 mg/dL    Alkaline Phosphatase 124 40 - 150 U/L    AST 29 0 - 45 U/L    ALT 39 0 - 70 U/L    Bilirubin Direct 0.54 (H) 0.00 - 0.30 mg/dL   Result Value Ref Range    Lipase 25 13 - 60 U/L       RADIOLOGY:  I have independently reviewed and interpreted the above imaging, pending the final radiology read.  CT Abdomen Pelvis w Contrast   Final Result   IMPRESSION:    1.  Findings of partial or early high-grade small bowel obstruction with transition in the right lower abdomen, probably secondary to an adhesion; however, there is also findings of distal ileitis and obstruction may be at least partly secondary to an    inflammatory stricture. The distribution of findings appears similar to the prior examination 11/07/2022, suspicious for recurrent obstruction and recurrent distal ileitis, suspicious for inflammatory bowel disease.   2.  Additional incidental/chronic findings, as detailed.            I, Marli Pearce, am serving as a scribe to document services personally performed by Dr. Foreman based on my observation and the provider's statements to me. I, Rip Foreman, DO attest that Marli Pearce is acting in a scribe capacity, has observed my performance of the services and has documented them in accordance with my direction.    Rip Foreman DO  Emergency Medicine  [facility] EMERGENCY DEPARTMENT  [address] [place] [id number]  [phone]  Dept: [phone]      Rip Foreman DO  07/11/25 9432

## 2025-07-11 NOTE — PLAN OF CARE
Goal Outcome Evaluation:      Plan of Care Reviewed With: patient  Pt arrived from ED at around 1655. A&Ox4. Pt c/o pain 2/10. Pt requesting IV Dilaudid to manage pain before pain increases. PRN Compazine given prior to NG placement. Independent in room. NS running at 75mL/hr. NG placed, on LIS. X-ray results pending.

## 2025-07-11 NOTE — ED TRIAGE NOTES
"He has had nausea and vomiting for the last day or so. He says this happens \"every three years. And it has to do with an abdominal surgery as a kid.\" Sounds like he gets an obstruction of some sort or another.        "

## 2025-07-11 NOTE — H&P
[facility]    History and Physical - Hospitalist Service       Date of Admission:  7/11/2025    Rogers Martinez is a 65 year old male with a pertinent history of bowel resection, SBO, GERD, and hyperlipidemia who presents to this ED via walk-in for evaluation of nausea and vomit.  CT abdomen with partial/early high-grade SBO, he is admitted for surgical evaluation    Assessment & Plan    Partial early high-grade SBO on CT  Presented with abdominal pain, nausea and vomiting  History of recurrent symptoms due to complications of abdominal surgery in childhood  Admit for surgical evaluation  IV hydration started, n.p.o. for now, optimize analgesia  NG tube if nausea/vomiting persist    Full CT results below  IMPRESSION:   1.  Findings of partial or early high-grade small bowel obstruction with transition in the right lower abdomen, probably secondary to an adhesion; however, there is also findings of distal ileitis and obstruction may be at least partly secondary to an   inflammatory stricture. The distribution of findings appears similar to the prior examination 11/07/2022, suspicious for recurrent obstruction and recurrent distal ileitis, suspicious for inflammatory bowel disease.    Atrial fibrillation status post ablation in the past  No cardiac symptoms currently  EKG no acute ischemic changes  Will continue PTA meds for now,  Also on asa     Chronic elevated bilirubin  Possible Gilbert's, PCP follow-up    WBC 14.3  However no signs for any overt infection  Will trend for now, likely stress reaction      Full code per discussion with patient at bedside          Diet: NPO for Medical/Clinical Reasons Except for: Meds, Ice Chips    DVT Prophylaxis: Low Risk/Ambulatory with no VTE prophylaxis indicated  Sinha Catheter: Not present  Lines: None     Cardiac Monitoring: None  Code Status:  Full code    Clinically Significant Risk Factors Present on Admission                 # Drug Induced  Platelet Defect: home medication list includes an antiplatelet medication                        Disposition Plan     Medically Ready for Discharge: Anticipated in 2-4 Days           Lewis Bowens MD  Hospitalist Service  [facility]  Securely message with [employer] (more info)  Text page via [employer] Paging/Directory     ______________________________________________________________________    Chief Complaint   Abdominal pain, nausea vomiting of 1 day duration      History is obtained from the patient    History of Present Illness   Patient reports nausea with vomiting for the last day. He cannot keep any liquids down.  Also describes lower abdominal pain, and some obstipation.  Denies any fever or chills, no chest pain, no SOB, no alternating diarrhea he did get a bowel resection as a child and has had issues with nausea/vomiting since.  He has recurrent admissions for SBO, usually  treated conservatively.  Denies any chest pain, no palpitations no dizziness or loss of consciousness.    In ED he was in mild painful distress, vitals however noted stable, BP was 170/64, heart rate 64, temperature was normal, CT scan abdomen showed early/partial SBO, ED spoke to surgery who saw patient at bedside.  Recommendations noted, trial of Gastrografin ordered        Past Medical History    Past Medical History:   Diagnosis Date    Paroxysmal atrial fibrillation (H)        Past Surgical History   Past Surgical History:   Procedure Laterality Date    APPENDECTOMY      BOWEL RESECTION      with appy, age 9    CARDIOVERSION      10/16/16, 4/16/17, 8/16/19    MS EPHYS EVL TRNSPTL TX ATRIAL FIB ISOLAT PULM VEIN Left 7/12/2017    Procedure: EP Ablation PVI  RF to 4 PV;  Surgeon: Celina Marquez MD;        Prior to Admission Medications   Prior to Admission Medications   Prescriptions Last Dose Informant Patient Reported? Taking?   aspirin 81 MG EC tablet 7/10/2025 Morning  Yes Yes   Sig: Take 81 mg by  mouth every morning.   atorvastatin (LIPITOR) 10 MG tablet 7/10/2025 Morning  Yes Yes   Sig: Take 1 tablet by mouth every morning.   cholecalciferol 50 MCG (2000 UT) CAPS 7/10/2025 Morning  Yes Yes   Sig: Take 2,000 Units by mouth every morning.      Facility-Administered Medications: None           Physical Exam   Vital Signs: Temp: 97.5  F (36.4  C) Temp src: Temporal BP: 131/71 Pulse: 66   Resp: 18 SpO2: 95 % O2 Device: None (Room air)    Weight: 226 lbs 0 oz  General Appearance:  AO x 3, not in distress, speaking in full sentences  Respiratory: Clear anteriorly  Cardiovascular: S1-S2, no murmurs heard,   GI: Nondistended, soft, nontender, bowel sounds are hypoactive  Skin: No noted erythema  Other: No edema in lower extremities  Neurology is nonfocal for weakness in upper or lower extremity       Medical Decision Making       7 5 MINUTES SPENT BY ME on the date of service doing chart review, history, exam, documentation & further activities per the note.      Data   Imaging results reviewed over the past 24 hrs:   Recent Results (from the past 24 hours)   CT Abdomen Pelvis w Contrast    Narrative    EXAM: CT ABDOMEN PELVIS W CONTRAST  LOCATION: [facility]  DATE: 7/11/2025    INDICATION: diffuse abd pain  COMPARISON: 11/07/2022  TECHNIQUE: CT scan of the abdomen and pelvis was performed following injection of IV contrast. Multiplanar reformats were obtained. Dose reduction techniques were used.  CONTRAST: IsoVue 370 90mL    FINDINGS:   LOWER CHEST: Normal.    HEPATOBILIARY: Normal.    PANCREAS: Normal.    SPLEEN: Normal.    ADRENAL GLANDS: Normal.    KIDNEYS/BLADDER: Normal.    BOWEL: The stomach is moderately distended with fluid. Normal caliber of the proximal small bowel. The mid to distal small bowel is mildly dilated measuring up to approximately 3.2 cm with the distal small bowel relatively collapsed. Transition appears   to occur within the right lower abdomen (image 197, series  3). Fluid is seen within the nondilated colon extending to the sigmoid colon. Formed stool is seen in the rectum. There is mild wall thickening of segments of nondilated small bowel in the right   lower abdomen; the distribution of small bowel wall thickening appears similar to the prior examination and there is neural fat within the distal ileum, suggesting prior inflammation. There is a tiny appendiceal remnant, consistent with prior   appendectomy.    LYMPH NODES: No lymphadenopathy. Mild mesenteric edema. No significant ascites. No findings of bowel perforation or mesenteric abscess.    VASCULATURE: No abdominal aortic aneurysm. Mild calcified atherosclerosis    PELVIC ORGANS: Unchanged probable prostate utricle cyst. The prostate gland is normal in size. No pelvic free fluid.    MUSCULOSKELETAL: Normal.      Impression    IMPRESSION:   1.  Findings of partial or early high-grade small bowel obstruction with transition in the right lower abdomen, probably secondary to an adhesion; however, there is also findings of distal ileitis and obstruction may be at least partly secondary to an   inflammatory stricture. The distribution of findings appears similar to the prior examination 11/07/2022, suspicious for recurrent obstruction and recurrent distal ileitis, suspicious for inflammatory bowel disease.  2.  Additional incidental/chronic findings, as detailed.

## 2025-07-11 NOTE — CONSULTS
General Surgery Consultation  Rogers Martinez MRN# [id number]   Age/Sex: 65 year old male YOB: 1960     Reason for consult: 1. SBO (small bowel obstruction) (H)            Requesting physician: Dr. Bowens                  Assessment and Plan:   Assessment:  Rogers Martinez is a 65 year old PMH open appendectomy/?ileocecectomy and subsequent SBO as a child for perf appendix and recurrent SBOs here with abdominal pain since this morning and obstipation, last BM this am normal. No Hx IBD, WNL colonoscopy w/polyp removal recently. Imaging with concerns over enteritis / SBO and question of etiology of SBO via stricture vs. Adhesions.  Plan for conservative management with gastrografin challenge.    Plan:  -NPO  -Gastrografin challenge plan for NG to LIS, if difficult NG placement can do PO  -Ambulation in the halls TID  -No surgical intervention planned          Chief Complaint:     Chief Complaint   Patient presents with    Abdominal Pain    Nausea & Vomiting        History is obtained from the patient and electronic health record    HPI:   Rogers Martinez is a 65 year old male who presents with abdominal pain and obstipation since this morning however he had a normal bowel movement this morning.  Had nausea but well-controlled on medications.  No emesis.  No fever or chills.  No recent illness.  Is not passing flatus though the abdominal pain has resolved with pain medications.  No history of Crohn's or IBD or ulcerative colitis in history history or his family.  Recently had a colonoscopy in the last year to year and a half patient states polyp removal but otherwise WNL.  Denies history of colon cancer.     Patient confirms he had an open appendectomy with a piece of bowel taken out as well, possible ileocecectomy.  He had this for perforated appendicitis and subsequently had SBO from that stay afterward.  Has had intermittent SBO's since then, hospitalized about 4 times managed conservatively.          Past Medical History:     Past Medical History:   Diagnosis Date    Paroxysmal atrial fibrillation (H)               Past Surgical History:     Past Surgical History:   Procedure Laterality Date    APPENDECTOMY      BOWEL RESECTION      with jessica, age 9    CARDIOVERSION      10/16/16, 4/16/17, 8/16/19    VT EPHYS EVL TRNSPTL TX ATRIAL FIB ISOLAT PULM VEIN Left 7/12/2017    Procedure: EP Ablation PVI  RF to 4 PV;  Surgeon: Celina Marquez MD;              Social History:    reports that he has quit smoking. His smoking use included cigarettes and cigars. He has quit using smokeless tobacco.  His smokeless tobacco use included chew. He reports current alcohol use. He reports that he does not use drugs.           Family History:     Family History   Problem Relation Age of Onset    Atrial fibrillation Mother     Cerebrovascular Disease Mother     Aortic aneurysm Father     Atrial fibrillation Sister     Atrial fibrillation Brother               Allergies:   No Known Allergies           Medications:     Prior to Admission medications    Medication Sig Start Date End Date Taking? Authorizing Provider   aspirin 81 MG EC tablet Take 81 mg by mouth every morning.   Yes Reported, Patient   atorvastatin (LIPITOR) 10 MG tablet Take 1 tablet by mouth every morning. 7/2/25  Yes Reported, Patient   cholecalciferol 50 MCG (2000 UT) CAPS Take 2,000 Units by mouth every morning. 7/24/18  Yes Provider, Historical              Review of Systems:   The Review of Systems is negative other than noted in the HPI            Physical Exam:   Temp:  [97.5  F (36.4  C)-98.9  F (37.2  C)] 98.9  F (37.2  C)  Pulse:  [64-75] 64  Resp:  [17-18] 17  BP: (117-131)/(64-76) 117/64  SpO2:  [95 %-99 %] 96 %    No intake or output data in the 24 hours ending 07/11/25 7991   Constitutional:   Awake, alert, cooperative, no apparent distress, and appears stated age       Eyes:   PERRL, conjunctiva/corneas clear, EOM's intact; no scleral edema  or icterus noted        ENT:   Normocephalic, without obvious abnormality, atraumatic, Lips, mucosa, and tongue normal        Lungs:   Normal respiratory effort, no accessory muscle use         Abdomen:   Generally soft with a component of distention though very mild.  Generally with some tenderness in the palpation of the right lower quadrant near the previous open appendix scar.  Otherwise generally nontender and no peritoneal signs or guarding on exam       Musculoskeletal:   No obvious swelling, bruising or deformity on exposed limbs/skin       Skin:   Skin color and texture normal for patient, no rashes or lesions on exposed skin             Data:         All imaging studies reviewed by me.    Recent Results (from the past 24 hours)   CBC with platelets   Result Value Ref Range    WBC Count 14.3 (H) 4.0 - 11.0 10e3/uL    RBC Count 5.08 4.40 - 5.90 10e6/uL    Hemoglobin 16.0 13.3 - 17.7 g/dL    Hematocrit 46.7 40.0 - 53.0 %    MCV 92 78 - 100 fL    MCH 31.5 26.5 - 33.0 pg    MCHC 34.3 31.5 - 36.5 g/dL    RDW 13.3 10.0 - 15.0 %    Platelet Count 273 150 - 450 10e3/uL   Basic metabolic panel   Result Value Ref Range    Sodium 140 135 - 145 mmol/L    Potassium 4.3 3.4 - 5.3 mmol/L    Chloride 102 98 - 107 mmol/L    Carbon Dioxide (CO2) 25 22 - 29 mmol/L    Anion Gap 13 7 - 15 mmol/L    Urea Nitrogen 8.9 8.0 - 23.0 mg/dL    Creatinine 0.91 0.67 - 1.17 mg/dL    GFR Estimate >90 >60 mL/min/1.73m2    Calcium 9.5 8.8 - 10.4 mg/dL    Glucose 126 (H) 70 - 99 mg/dL   Hepatic function panel   Result Value Ref Range    Protein Total 8.0 6.4 - 8.3 g/dL    Albumin 4.6 3.5 - 5.2 g/dL    Bilirubin Total 2.4 (H) <=1.2 mg/dL    Alkaline Phosphatase 124 40 - 150 U/L    AST 29 0 - 45 U/L    ALT 39 0 - 70 U/L    Bilirubin Direct 0.54 (H) 0.00 - 0.30 mg/dL   Lipase   Result Value Ref Range    Lipase 25 13 - 60 U/L   CT Abdomen Pelvis w Contrast    Narrative    EXAM: CT ABDOMEN PELVIS W CONTRAST  LOCATION: [facility]  DATE: 7/11/2025    INDICATION: diffuse abd pain  COMPARISON: 11/07/2022  TECHNIQUE: CT scan of the abdomen and pelvis was performed following injection of IV contrast. Multiplanar reformats were obtained. Dose reduction techniques were used.  CONTRAST: IsoVue 370 90mL    FINDINGS:   LOWER CHEST: Normal.    HEPATOBILIARY: Normal.    PANCREAS: Normal.    SPLEEN: Normal.    ADRENAL GLANDS: Normal.    KIDNEYS/BLADDER: Normal.    BOWEL: The stomach is moderately distended with fluid. Normal caliber of the proximal small bowel. The mid to distal small bowel is mildly dilated measuring up to approximately 3.2 cm with the distal small bowel relatively collapsed. Transition appears   to occur within the right lower abdomen (image 197, series 3). Fluid is seen within the nondilated colon extending to the sigmoid colon. Formed stool is seen in the rectum. There is mild wall thickening of segments of nondilated small bowel in the right   lower abdomen; the distribution of small bowel wall thickening appears similar to the prior examination and there is neural fat within the distal ileum, suggesting prior inflammation. There is a tiny appendiceal remnant, consistent with prior   appendectomy.    LYMPH NODES: No lymphadenopathy. Mild mesenteric edema. No significant ascites. No findings of bowel perforation or mesenteric abscess.    VASCULATURE: No abdominal aortic aneurysm. Mild calcified atherosclerosis    PELVIC ORGANS: Unchanged probable prostate utricle cyst. The prostate gland is normal in size. No pelvic free fluid.    MUSCULOSKELETAL: Normal.      Impression    IMPRESSION:   1.  Findings of partial or early high-grade small bowel obstruction with transition in the right lower abdomen, probably secondary to an adhesion; however, there is also findings of distal ileitis and obstruction may be at least partly secondary to an   inflammatory stricture. The distribution of findings appears similar to the prior  examination 11/07/2022, suspicious for recurrent obstruction and recurrent distal ileitis, suspicious for inflammatory bowel disease.  2.  Additional incidental/chronic findings, as detailed.           BALDEV Valdez [facility]  [address] [place] [id number]

## 2025-07-12 ENCOUNTER — APPOINTMENT (OUTPATIENT)
Dept: RADIOLOGY | Facility: HOSPITAL | Age: 65
End: 2025-07-12
Payer: MEDICARE

## 2025-07-12 LAB
ANION GAP SERPL CALCULATED.3IONS-SCNC: 9 MMOL/L (ref 7–15)
BUN SERPL-MCNC: 10.5 MG/DL (ref 8–23)
CALCIUM SERPL-MCNC: 8.7 MG/DL (ref 8.8–10.4)
CHLORIDE SERPL-SCNC: 104 MMOL/L (ref 98–107)
CREAT SERPL-MCNC: 0.95 MG/DL (ref 0.67–1.17)
EGFRCR SERPLBLD CKD-EPI 2021: 88 ML/MIN/1.73M2
ERYTHROCYTE [DISTWIDTH] IN BLOOD BY AUTOMATED COUNT: 13.5 % (ref 10–15)
GLUCOSE SERPL-MCNC: 111 MG/DL (ref 70–99)
HCO3 SERPL-SCNC: 29 MMOL/L (ref 22–29)
HCT VFR BLD AUTO: 43.3 % (ref 40–53)
HGB BLD-MCNC: 14.4 G/DL (ref 13.3–17.7)
MCH RBC QN AUTO: 31.1 PG (ref 26.5–33)
MCHC RBC AUTO-ENTMCNC: 33.3 G/DL (ref 31.5–36.5)
MCV RBC AUTO: 94 FL (ref 78–100)
PLATELET # BLD AUTO: 261 10E3/UL (ref 150–450)
POTASSIUM SERPL-SCNC: 3.9 MMOL/L (ref 3.4–5.3)
RBC # BLD AUTO: 4.63 10E6/UL (ref 4.4–5.9)
SODIUM SERPL-SCNC: 142 MMOL/L (ref 135–145)
WBC # BLD AUTO: 7.3 10E3/UL (ref 4–11)

## 2025-07-12 PROCEDURE — 36415 COLL VENOUS BLD VENIPUNCTURE: CPT | Performed by: INTERNAL MEDICINE

## 2025-07-12 PROCEDURE — 80048 BASIC METABOLIC PNL TOTAL CA: CPT | Performed by: INTERNAL MEDICINE

## 2025-07-12 PROCEDURE — 250N000013 HC RX MED GY IP 250 OP 250 PS 637

## 2025-07-12 PROCEDURE — 250N000011 HC RX IP 250 OP 636: Performed by: INTERNAL MEDICINE

## 2025-07-12 PROCEDURE — 250N000011 HC RX IP 250 OP 636: Performed by: STUDENT IN AN ORGANIZED HEALTH CARE EDUCATION/TRAINING PROGRAM

## 2025-07-12 PROCEDURE — 85014 HEMATOCRIT: CPT | Performed by: INTERNAL MEDICINE

## 2025-07-12 PROCEDURE — 99232 SBSQ HOSP IP/OBS MODERATE 35: CPT

## 2025-07-12 PROCEDURE — 258N000003 HC RX IP 258 OP 636: Performed by: INTERNAL MEDICINE

## 2025-07-12 PROCEDURE — 99233 SBSQ HOSP IP/OBS HIGH 50: CPT | Performed by: STUDENT IN AN ORGANIZED HEALTH CARE EDUCATION/TRAINING PROGRAM

## 2025-07-12 PROCEDURE — 74018 RADEX ABDOMEN 1 VIEW: CPT

## 2025-07-12 PROCEDURE — 120N000001 HC R&B MED SURG/OB

## 2025-07-12 RX ORDER — ENOXAPARIN SODIUM 100 MG/ML
40 INJECTION SUBCUTANEOUS EVERY 24 HOURS
Status: DISCONTINUED | OUTPATIENT
Start: 2025-07-12 | End: 2025-07-14 | Stop reason: HOSPADM

## 2025-07-12 RX ADMIN — HYDROMORPHONE HYDROCHLORIDE 0.2 MG: 0.2 INJECTION, SOLUTION INTRAMUSCULAR; INTRAVENOUS; SUBCUTANEOUS at 00:59

## 2025-07-12 RX ADMIN — SODIUM CHLORIDE: 0.9 INJECTION, SOLUTION INTRAVENOUS at 16:48

## 2025-07-12 RX ADMIN — ENOXAPARIN SODIUM 40 MG: 40 INJECTION SUBCUTANEOUS at 14:04

## 2025-07-12 RX ADMIN — PHENOL 1 ML: 1.5 LIQUID ORAL at 08:52

## 2025-07-12 RX ADMIN — SODIUM CHLORIDE: 0.9 INJECTION, SOLUTION INTRAVENOUS at 04:36

## 2025-07-12 RX ADMIN — HYDROMORPHONE HYDROCHLORIDE 0.2 MG: 0.2 INJECTION, SOLUTION INTRAMUSCULAR; INTRAVENOUS; SUBCUTANEOUS at 12:31

## 2025-07-12 RX ADMIN — HYDROMORPHONE HYDROCHLORIDE 0.2 MG: 0.2 INJECTION, SOLUTION INTRAMUSCULAR; INTRAVENOUS; SUBCUTANEOUS at 04:40

## 2025-07-12 ASSESSMENT — ACTIVITIES OF DAILY LIVING (ADL)
ADLS_ACUITY_SCORE: 24
ADLS_ACUITY_SCORE: 22
ADLS_ACUITY_SCORE: 24
ADLS_ACUITY_SCORE: 24
ADLS_ACUITY_SCORE: 22
ADLS_ACUITY_SCORE: 24
ADLS_ACUITY_SCORE: 22
ADLS_ACUITY_SCORE: 23
ADLS_ACUITY_SCORE: 22
ADLS_ACUITY_SCORE: 23

## 2025-07-12 NOTE — PROGRESS NOTES
[facility]    Medicine Progress Note - Hospitalist Service    Date of Admission:  7/11/2025    Assessment & Plan   Rogers Martinez is a 65 year old male with a pertinent history of bowel resection, SBO, GERD, and hyperlipidemia who presents to this ED via walk-in for evaluation of nausea and emesis.  CT abdomen with partial/early high-grade SBO.    SBO  - Although leukocytosis noted, no other infectious s/s. SBO likely due to adhesions but per CT there is question of underlying inflammation as well.   - Surgery following, s/p gastrografin study   - IVF, NPO, NGT per surgery   - Continue pain regimen  - Encourage ambulation     Question of distal ileitis and inflammatory stricture on CT  - No overt s/s of systemic inflammation besides mild leukocytosis as noted above (WBC has since normalized). No infectious s/s  - Monitor with serial exam, may need GI recs but hold off consult for now    Chronic hyperbilirubinemia  - Noted since 2019, monitor and recommend outpatient follow-up     A-fib  Hx ablation  - Contine PTA meds when able to tolerate PO             Diet: NPO for Medical/Clinical Reasons Except for: Meds    DVT Prophylaxis: Lovenox   Sinha Catheter: Not present  Lines: None     Cardiac Monitoring: ACTIVE order. Indication: Tachyarrhythmias, acute (48 hours)  Code Status: Full Code      Clinically Significant Risk Factors Present on Admission                 # Drug Induced Platelet Defect: home medication list includes an antiplatelet medication                        Social Drivers of Health    Tobacco Use: Medium Risk (7/11/2025)    Patient History     Smoking Tobacco Use: Former     Smokeless Tobacco Use: Former          Disposition Plan     Medically Ready for Discharge: Anticipated Tomorrow             David Garcia DO  Hospitalist Service  [facility]  Securely message with [employer] (more info)  Text page via [employer] Paging/Directory    ______________________________________________________________________    Interval History   Not feeling great but did have small BM his AM.     Physical Exam   Vital Signs: Temp: 98.9  F (37.2  C) Temp src: Oral BP: 128/72 Pulse: 61   Resp: 18 SpO2: 93 % O2 Device: None (Room air)    Weight: 226 lbs 0 oz    General Appearance: Nontoxic, mildly anxious without acute distress  Respiratory: Nonlabored breathing  Cardiovascular: RRR without ectopy  GI: Mild distention without rebound or guarding, mild abdominal tenseness, no masses  Skin: No exposed rash or swollen joints  Other: No focal deficits    Medical Decision Making       55 MINUTES SPENT BY ME on the date of service doing chart review, history, exam, documentation & further activities per the note.      Data     I have personally reviewed the following data over the past 24 hrs:    7.3  \   14.4   / 261     142 104 10.5 /  111 (H)   3.9 29 0.95 \     ALT: N/A AST: N/A AP: N/A TBILI: N/A   ALB: N/A TOT PROTEIN: N/A LIPASE: N/A       Imaging results reviewed over the past 24 hrs:   Recent Results (from the past 24 hours)   CT Abdomen Pelvis w Contrast    Narrative    EXAM: CT ABDOMEN PELVIS W CONTRAST  LOCATION: [facility]  DATE: 7/11/2025    INDICATION: diffuse abd pain  COMPARISON: 11/07/2022  TECHNIQUE: CT scan of the abdomen and pelvis was performed following injection of IV contrast. Multiplanar reformats were obtained. Dose reduction techniques were used.  CONTRAST: IsoVue 370 90mL    FINDINGS:   LOWER CHEST: Normal.    HEPATOBILIARY: Normal.    PANCREAS: Normal.    SPLEEN: Normal.    ADRENAL GLANDS: Normal.    KIDNEYS/BLADDER: Normal.    BOWEL: The stomach is moderately distended with fluid. Normal caliber of the proximal small bowel. The mid to distal small bowel is mildly dilated measuring up to approximately 3.2 cm with the distal small bowel relatively collapsed. Transition appears   to occur within the right lower abdomen  (image 197, series 3). Fluid is seen within the nondilated colon extending to the sigmoid colon. Formed stool is seen in the rectum. There is mild wall thickening of segments of nondilated small bowel in the right   lower abdomen; the distribution of small bowel wall thickening appears similar to the prior examination and there is neural fat within the distal ileum, suggesting prior inflammation. There is a tiny appendiceal remnant, consistent with prior   appendectomy.    LYMPH NODES: No lymphadenopathy. Mild mesenteric edema. No significant ascites. No findings of bowel perforation or mesenteric abscess.    VASCULATURE: No abdominal aortic aneurysm. Mild calcified atherosclerosis    PELVIC ORGANS: Unchanged probable prostate utricle cyst. The prostate gland is normal in size. No pelvic free fluid.    MUSCULOSKELETAL: Normal.      Impression    IMPRESSION:   1.  Findings of partial or early high-grade small bowel obstruction with transition in the right lower abdomen, probably secondary to an adhesion; however, there is also findings of distal ileitis and obstruction may be at least partly secondary to an   inflammatory stricture. The distribution of findings appears similar to the prior examination 11/07/2022, suspicious for recurrent obstruction and recurrent distal ileitis, suspicious for inflammatory bowel disease.  2.  Additional incidental/chronic findings, as detailed.   XR Abdomen Port 1 View    Narrative    EXAM: XR ABDOMEN PORT 1 VIEW  LOCATION: [facility]  DATE: 7/11/2025    INDICATION: Abdominal pain.  COMPARISON: CT from today.      Impression    IMPRESSION: NG tube is in good position within stomach.   XR Gastrografin Challenge    Narrative    EXAM: XR GASTROGRAFIN CHALLENGE  LOCATION: [facility]  DATE: 7/12/2025    INDICATION: Small Bowel Obstruction  COMPARISON: Abdominal x-ray from yesterday at roughly 6:42 PM.  TECHNIQUE: Routine water soluble  contrast follow-through challenge.      Impression    IMPRESSION:  1.  Ingested diluted enteric contrast within the persistently distended central small bowel in the setting of known SBO.  2.  No contrast within the colon.

## 2025-07-12 NOTE — PLAN OF CARE
Goal Outcome Evaluation:      Plan of Care Reviewed With: patient  Pt A&Ox4, denies nausea. Pt c/o throat discomfort and irritation, pt given ice chips. Pt reports decreased discomfort and irritation with ice chips. Pt had 4 bm this shift and reports passing gas. NG on LIS, green output. Pt ambulating halls independently.

## 2025-07-12 NOTE — PLAN OF CARE
Goal Outcome Evaluation:      Plan of Care Reviewed With: patient  Pt alert and oriented, pt c/o of throat discomfort and irritation, PRN chloraseptic spray used. Pt stated chloraseptic spray ineffective. Pt c/o of headache 3/10, PRN IV Dilaudid given. Pt states not being able to tolerate oral Tylenol. Pt ambulated in hallways independently. Pt had 2 bm this shift, pt reports passing gas. Pt remains NPO. NS running at 75mL/hr. NG LIS.

## 2025-07-12 NOTE — PROGRESS NOTES
"General Surgery Progress Note:    Hospital Day # 1    ASSESSMENT:   1. SBO (small bowel obstruction) (H)      Rogers Martinez is a 65 year old male with history of recurrent SBO who presents with a SBO secondary to adhesions versus some inflammatory process on imaging. Underwent a GGC via NGT which showed no contrast in colon on follow-up imaging but patient reports having 1 large BM this morning and passed gas x 1. Patient feels like his abdominal pain has improved, is less distended, and denies N/V. Plan to continue NPO for bowel rest and NGT on LIS for another day. Hopeful he will have further ROBF but may need surgical intervention if he does not continue to improve.    PLAN:   - NPO for bowel rest  - NGT on LIS  - Encourage activity and ambulation to promote bowel function  - Serial abdominal exams  - Medical management per primary team  - General surgery will continue to follow    SUBJECTIVE:   Rogers Martinez is feeling better today. Reports abdominal pain has improved and feels less distended. Had 1 large loose/watery BM this morning and passed gas x 1. Denies nausea, vomiting, fever, chills.     Patient Vitals for the past 24 hrs:   BP Temp Temp src Pulse Resp SpO2 Height Weight   07/12/25 1139 128/78 98.1  F (36.7  C) Oral 70 16 97 % -- --   07/12/25 1126 -- -- -- -- -- -- 1.905 m (6' 3\") --   07/12/25 0728 127/74 99.1  F (37.3  C) Oral 64 20 95 % -- --   07/12/25 0445 128/72 98.9  F (37.2  C) Oral 61 18 93 % -- --   07/12/25 0052 114/70 98.8  F (37.1  C) Oral 63 20 95 % -- --   07/11/25 1920 120/68 98  F (36.7  C) Oral 67 18 96 % -- --   07/11/25 1700 135/74 98.2  F (36.8  C) Oral 68 18 97 % -- --   07/11/25 1515 117/64 98.9  F (37.2  C) Oral 64 17 96 % -- --   07/11/25 1500 -- -- -- 66 -- 95 % -- --   07/11/25 1445 131/71 -- -- 68 -- 96 % -- --   07/11/25 1430 128/68 -- -- 64 -- 97 % -- --   07/11/25 1415 130/67 -- -- 67 -- 95 % -- --   07/11/25 1345 128/64 -- -- 64 -- 98 % -- --   07/11/25 1315 130/75 " -- -- 67 -- 98 % -- --   07/11/25 1258 129/76 97.5  F (36.4  C) Temporal 75 18 99 % -- 102.5 kg (226 lb)     Physical Exam:  General: patient seen resting in bed, no acute distress, NGT in place with bilious OP  Resp: no respiratory distress, breathing comfortably on RA  Abdomen: soft, mildly distended, mild RLQ TTP around previous surgical incision scar, no rebound tenderness or guarding  Extremities: warm and well perfused    Admission on 07/11/2025   Component Date Value    WBC Count 07/11/2025 14.3 (H)     RBC Count 07/11/2025 5.08     Hemoglobin 07/11/2025 16.0     Hematocrit 07/11/2025 46.7     MCV 07/11/2025 92     MCH 07/11/2025 31.5     MCHC 07/11/2025 34.3     RDW 07/11/2025 13.3     Platelet Count 07/11/2025 273     Sodium 07/11/2025 140     Potassium 07/11/2025 4.3     Chloride 07/11/2025 102     Carbon Dioxide (CO2) 07/11/2025 25     Anion Gap 07/11/2025 13     Urea Nitrogen 07/11/2025 8.9     Creatinine 07/11/2025 0.91     GFR Estimate 07/11/2025 >90     Calcium 07/11/2025 9.5     Glucose 07/11/2025 126 (H)     Protein Total 07/11/2025 8.0     Albumin 07/11/2025 4.6     Bilirubin Total 07/11/2025 2.4 (H)     Alkaline Phosphatase 07/11/2025 124     AST 07/11/2025 29     ALT 07/11/2025 39     Bilirubin Direct 07/11/2025 0.54 (H)     Lipase 07/11/2025 25     Color Urine 07/11/2025 Light Yellow     Appearance Urine 07/11/2025 Clear     Glucose Urine 07/11/2025 Negative     Bilirubin Urine 07/11/2025 Negative     Ketones Urine 07/11/2025 Negative     Specific Gravity Urine 07/11/2025 1.005     Blood Urine 07/11/2025 Negative     pH Urine 07/11/2025 6.0     Protein Albumin Urine 07/11/2025 Negative     Urobilinogen Urine 07/11/2025 Normal     Nitrite Urine 07/11/2025 Negative     Leukocyte Esterase Urine 07/11/2025 Negative     Mucus Urine 07/11/2025 Present (A)     RBC Urine 07/11/2025 <1     WBC Urine 07/11/2025 <1     Sodium 07/12/2025 142     Potassium 07/12/2025 3.9     Chloride 07/12/2025 104      Carbon Dioxide (CO2) 07/12/2025 29     Anion Gap 07/12/2025 9     Urea Nitrogen 07/12/2025 10.5     Creatinine 07/12/2025 0.95     GFR Estimate 07/12/2025 88     Calcium 07/12/2025 8.7 (L)     Glucose 07/12/2025 111 (H)     WBC Count 07/12/2025 7.3     RBC Count 07/12/2025 4.63     Hemoglobin 07/12/2025 14.4     Hematocrit 07/12/2025 43.3     MCV 07/12/2025 94     MCH 07/12/2025 31.1     MCHC 07/12/2025 33.3     RDW 07/12/2025 13.5     Platelet Count 07/12/2025 261         Calli Fong PA-C  [facility]  [address] [place] [address] [address]  [place] [address]

## 2025-07-12 NOTE — PLAN OF CARE
Problem: Delirium  Goal: Optimal Coping  Outcome: Progressing  Goal: Improved Sleep  Outcome: Progressing     Problem: Nausea and Vomiting  Goal: Nausea and Vomiting Relief  Outcome: Progressing     Problem: Pain Acute  Goal: Optimal Pain Control and Function  Outcome: Progressing  Intervention: Prevent or Manage Pain  Recent Flowsheet Documentation  Taken 7/12/2025 0104 by Jessica Jason RN  Medication Review/Management: medications reviewed  Taken 7/11/2025 2006 by Jessica Jason RN  Medication Review/Management: medications reviewed   Goal Outcome Evaluation:       Pt A&Ox4 this shift. Pleasant and cooperative. Receiving PRN IV Dilaudid throughout shift for abd pain. NG in place and patent. Pt given oral contrast via NG as ordered, repeat x-ray completed at about 0400. Pt sleeping well most of NOC. Remains NPO, receiving IVF 75ml/hr. No nausea reported.

## 2025-07-13 LAB
HGB BLD-MCNC: 13 G/DL (ref 13.3–17.7)
MCV RBC AUTO: 93 FL (ref 78–100)

## 2025-07-13 PROCEDURE — 250N000011 HC RX IP 250 OP 636: Performed by: STUDENT IN AN ORGANIZED HEALTH CARE EDUCATION/TRAINING PROGRAM

## 2025-07-13 PROCEDURE — 258N000003 HC RX IP 258 OP 636: Performed by: INTERNAL MEDICINE

## 2025-07-13 PROCEDURE — 120N000001 HC R&B MED SURG/OB

## 2025-07-13 PROCEDURE — 36415 COLL VENOUS BLD VENIPUNCTURE: CPT | Performed by: STUDENT IN AN ORGANIZED HEALTH CARE EDUCATION/TRAINING PROGRAM

## 2025-07-13 PROCEDURE — 99233 SBSQ HOSP IP/OBS HIGH 50: CPT | Performed by: STUDENT IN AN ORGANIZED HEALTH CARE EDUCATION/TRAINING PROGRAM

## 2025-07-13 PROCEDURE — 250N000011 HC RX IP 250 OP 636: Performed by: INTERNAL MEDICINE

## 2025-07-13 PROCEDURE — 99232 SBSQ HOSP IP/OBS MODERATE 35: CPT

## 2025-07-13 PROCEDURE — 85018 HEMOGLOBIN: CPT | Performed by: STUDENT IN AN ORGANIZED HEALTH CARE EDUCATION/TRAINING PROGRAM

## 2025-07-13 RX ADMIN — ENOXAPARIN SODIUM 40 MG: 40 INJECTION SUBCUTANEOUS at 13:59

## 2025-07-13 RX ADMIN — SODIUM CHLORIDE: 0.9 INJECTION, SOLUTION INTRAVENOUS at 06:40

## 2025-07-13 RX ADMIN — HYDROMORPHONE HYDROCHLORIDE 0.2 MG: 0.2 INJECTION, SOLUTION INTRAMUSCULAR; INTRAVENOUS; SUBCUTANEOUS at 04:24

## 2025-07-13 ASSESSMENT — ACTIVITIES OF DAILY LIVING (ADL)
ADLS_ACUITY_SCORE: 24

## 2025-07-13 NOTE — PROGRESS NOTES
"[facility]    Medicine Progress Note - Hospitalist Service    Date of Admission:  2025    Assessment & Plan   Rogers Martinez is a 65 year old male with a pertinent history of bowel resection, SBO, GERD, and hyperlipidemia who presents to this ED via walk-in for evaluation of nausea and emesis.  CT abdomen with partial/early high-grade SBO.    SBO  - Although leukocytosis noted, no other infectious s/s. SBO likely due to adhesions but per CT there is question of underlying inflammation as well.   - Surgery following, s/p gastrografin study   - IVF, diet, NGT per surgery    - Advance to clears and clamping trial   - Continue pain regimen  - Encourage ambulation    Acute anemia  - Reported dark coloration in NGT output. Hgb lower this AM. Patient clinically improving, believe hgb  is dilutional  - Trend hgb and consider GI consult     Question of distal ileitis and inflammatory stricture on CT  - No overt s/s of systemic inflammation besides mild leukocytosis as noted above (WBC has since normalized). No infectious s/s  - Monitor with serial exam, may need GI recs but hold off consult for now    Chronic hyperbilirubinemia  - Noted since 2019, monitor and recommend outpatient follow-up     A-fib  Hx ablation  - Contine PTA meds when able to tolerate PO             Diet: Clear Liquid Diet    DVT Prophylaxis: Lovenox   Sinha Catheter: Not present  Lines: None     Cardiac Monitoring: ACTIVE order. Indication: Tachyarrhythmias, acute (48 hours)  Code Status: Full Code      Clinically Significant Risk Factors                              # Overweight: Estimated body mass index is 28.05 kg/m  as calculated from the following:    Height as of this encounter: 1.905 m (6' 3\").    Weight as of this encounter: 101.8 kg (224 lb 6.4 oz)., PRESENT ON ADMISSION            Social Drivers of Health    Tobacco Use: Medium Risk (2025)    Patient History     Smoking Tobacco Use: Former     " Smokeless Tobacco Use: Former          Disposition Plan     Medically Ready for Discharge: Anticipated Tomorrow             David Garcia DO  Hospitalist Service  [facility]  Securely message with Timothy (more info)  Text page via [email or web address] Paging/Directory   ______________________________________________________________________    Interval History   Has multiple watery bowel movements overnight. Passing flatus and feeling improved but still sluggish.     Physical Exam   Vital Signs: Temp: 98.3  F (36.8  C) Temp src: Oral BP: (!) 143/79 (Notified RN) Pulse: 59   Resp: 20 SpO2: 94 % O2 Device: None (Room air)    Weight: 224 lbs 6.4 oz    General Appearance: Nontoxic, mildly anxious without acute distress  Respiratory: Nonlabored breathing  Cardiovascular: RRR without ectopy  GI: Mild distention without rebound or guarding, mild abdominal tenseness, no masses  Skin: No exposed rash or swollen joints  Other: No focal deficits    Medical Decision Making       50 MINUTES SPENT BY ME on the date of service doing chart review, history, exam, documentation & further activities per the note.      Data     I have personally reviewed the following data over the past 24 hrs:    N/A  \   13.0 (L)   / N/A     N/A N/A N/A /  N/A   N/A N/A N/A \       Imaging results reviewed over the past 24 hrs:   No results found for this or any previous visit (from the past 24 hours).

## 2025-07-13 NOTE — PROGRESS NOTES
General Surgery Progress Note:    Hospital Day # 2    ASSESSMENT:   1. SBO (small bowel obstruction) (H)      Rogers Martinez is a 65 year old male with history of recurrent SBO who presents with a SBO secondary to adhesions vs some inflammatory process on imaging. Underwent a GGC via NGT yesterday which showed no contrast in colon on follow-up imaging but now passing gas and having multiple liquid/loose BMs.  Mild RLQ TTP on exam but otherwise non-distended and denies N/V. Plan to clamp NGT and trial clear liquid diet. If doing well then likely will be able to remove NGT and ADAT later today.    PLAN:   - Clear liquid diet and clamp NGT, if doing well then can remove NGT and ADAT later today  - Monitor for N/V, increased abdominal pain or distention  - Encourage activity and ambulation to promote bowel function  - Medical management per primary team  - General surgery will continue to follow    SUBJECTIVE:   Rogers Martinez is doing better today with improvement of his abdominal pain. Felt a little bloated earlier today but improved after having a BM. Passing gas and having multiple liquid/loose BMs. Denies nausea, vomiting. Discussed removing his NGT and starting a clear liquid diet but patient would like a clamp trial prior to fully removing his NGT.    Patient Vitals for the past 24 hrs:   BP Temp Temp src Pulse Resp SpO2 Weight   07/13/25 0725 (!) 143/79 98.3  F (36.8  C) Oral 59 20 94 % --   07/13/25 0411 138/76 97.3  F (36.3  C) Oral 59 20 94 % 101.8 kg (224 lb 6.4 oz)   07/12/25 2345 130/77 97.9  F (36.6  C) Oral 57 20 95 % --   07/12/25 2041 137/81 98.8  F (37.1  C) Oral 68 20 96 % --   07/12/25 1552 134/79 97.9  F (36.6  C) Oral 65 20 96 % --     Physical Exam:  General: patient seen resting in bed, no acute distress, NGT in place  Resp: no respiratory distress, breathing comfortably on RA  Abdomen: soft, non-distended, mild RLQ TTP but no peritoneal signs  Extremities: warm and well perfused    Admission  on 07/11/2025   Component Date Value    WBC Count 07/11/2025 14.3 (H)     RBC Count 07/11/2025 5.08     Hemoglobin 07/11/2025 16.0     Hematocrit 07/11/2025 46.7     MCV 07/11/2025 92     MCH 07/11/2025 31.5     MCHC 07/11/2025 34.3     RDW 07/11/2025 13.3     Platelet Count 07/11/2025 273     Sodium 07/11/2025 140     Potassium 07/11/2025 4.3     Chloride 07/11/2025 102     Carbon Dioxide (CO2) 07/11/2025 25     Anion Gap 07/11/2025 13     Urea Nitrogen 07/11/2025 8.9     Creatinine 07/11/2025 0.91     GFR Estimate 07/11/2025 >90     Calcium 07/11/2025 9.5     Glucose 07/11/2025 126 (H)     Protein Total 07/11/2025 8.0     Albumin 07/11/2025 4.6     Bilirubin Total 07/11/2025 2.4 (H)     Alkaline Phosphatase 07/11/2025 124     AST 07/11/2025 29     ALT 07/11/2025 39     Bilirubin Direct 07/11/2025 0.54 (H)     Lipase 07/11/2025 25     Color Urine 07/11/2025 Light Yellow     Appearance Urine 07/11/2025 Clear     Glucose Urine 07/11/2025 Negative     Bilirubin Urine 07/11/2025 Negative     Ketones Urine 07/11/2025 Negative     Specific Gravity Urine 07/11/2025 1.005     Blood Urine 07/11/2025 Negative     pH Urine 07/11/2025 6.0     Protein Albumin Urine 07/11/2025 Negative     Urobilinogen Urine 07/11/2025 Normal     Nitrite Urine 07/11/2025 Negative     Leukocyte Esterase Urine 07/11/2025 Negative     Mucus Urine 07/11/2025 Present (A)     RBC Urine 07/11/2025 <1     WBC Urine 07/11/2025 <1     Sodium 07/12/2025 142     Potassium 07/12/2025 3.9     Chloride 07/12/2025 104     Carbon Dioxide (CO2) 07/12/2025 29     Anion Gap 07/12/2025 9     Urea Nitrogen 07/12/2025 10.5     Creatinine 07/12/2025 0.95     GFR Estimate 07/12/2025 88     Calcium 07/12/2025 8.7 (L)     Glucose 07/12/2025 111 (H)     WBC Count 07/12/2025 7.3     RBC Count 07/12/2025 4.63     Hemoglobin 07/12/2025 14.4     Hematocrit 07/12/2025 43.3     MCV 07/12/2025 94     MCH 07/12/2025 31.1     MCHC 07/12/2025 33.3     RDW 07/12/2025 13.5      Platelet Count 07/12/2025 261     Hemoglobin 07/13/2025 13.0 (L)     MCV 07/13/2025 93         Calli Fong PA-C  [facility]  [address] [place] [id number]

## 2025-07-13 NOTE — PLAN OF CARE
"  Problem: Adult Inpatient Plan of Care  Goal: Optimal Comfort and Wellbeing  Outcome: Progressing     Problem: Nausea and Vomiting  Goal: Nausea and Vomiting Relief  Outcome: Progressing     Problem: Pain Acute  Goal: Optimal Pain Control and Function  Outcome: Progressing   Goal Outcome Evaluation:       Patient alert and oriented x 4. Denied pain and nausea. NG clamped at 1130. Tolerated clear liquids for lunch but patient would like to wait until after dinner for NG tube to be out. Patient reported having two soft stools (\"no longer watery\"). Patient reported 3 bowel movements since 0700. Patient ambulating at hallway independently. NSR on telemetry.                     "

## 2025-07-14 VITALS
DIASTOLIC BLOOD PRESSURE: 77 MMHG | SYSTOLIC BLOOD PRESSURE: 126 MMHG | TEMPERATURE: 99.2 F | RESPIRATION RATE: 17 BRPM | OXYGEN SATURATION: 97 % | WEIGHT: 224.4 LBS | HEART RATE: 55 BPM | HEIGHT: 75 IN | BODY MASS INDEX: 27.9 KG/M2

## 2025-07-14 LAB
HGB BLD-MCNC: 12.6 G/DL (ref 13.3–17.7)
HOLD SPECIMEN: NORMAL
MCV RBC AUTO: 92 FL (ref 78–100)

## 2025-07-14 PROCEDURE — 99239 HOSP IP/OBS DSCHRG MGMT >30: CPT | Performed by: STUDENT IN AN ORGANIZED HEALTH CARE EDUCATION/TRAINING PROGRAM

## 2025-07-14 PROCEDURE — 99231 SBSQ HOSP IP/OBS SF/LOW 25: CPT

## 2025-07-14 PROCEDURE — 36415 COLL VENOUS BLD VENIPUNCTURE: CPT | Performed by: STUDENT IN AN ORGANIZED HEALTH CARE EDUCATION/TRAINING PROGRAM

## 2025-07-14 PROCEDURE — 85018 HEMOGLOBIN: CPT | Performed by: STUDENT IN AN ORGANIZED HEALTH CARE EDUCATION/TRAINING PROGRAM

## 2025-07-14 RX ORDER — ONDANSETRON 4 MG/1
4 TABLET, ORALLY DISINTEGRATING ORAL EVERY 6 HOURS PRN
Qty: 20 TABLET | Refills: 0 | Status: SHIPPED | OUTPATIENT
Start: 2025-07-14

## 2025-07-14 ASSESSMENT — ACTIVITIES OF DAILY LIVING (ADL)
ADLS_ACUITY_SCORE: 24

## 2025-07-14 NOTE — PLAN OF CARE
Problem: Adult Inpatient Plan of Care  Goal: Optimal Comfort and Wellbeing  Outcome: Progressing     Problem: Pain Acute  Goal: Optimal Pain Control and Function  Outcome: Progressing  Intervention: Prevent or Manage Pain  Recent Flowsheet Documentation  Taken 7/14/2025 0230 by Jessica Jason RN  Medication Review/Management: medications reviewed  Taken 7/13/2025 2115 by Jessica Jason, RN  Medication Review/Management: medications reviewed   Goal Outcome Evaluation:       Pt A&Ox4. Pleasant and cooperative. IVF stopped d/t tolerating full liquid diet. Need order to ADAT. NG pulled last evening. Pt denies pain. No N/V. Up Ind. Walked halls last evening. Pt hoping to discharge home later today.

## 2025-07-14 NOTE — PROGRESS NOTES
General Surgery Progress Note:    Hospital Day # 3    ASSESSMENT:     1. SBO (small bowel obstruction) (H)        Rogers Martinez is a 65 year old male PMH open appendectomy with recurrent SBOs here with obstipation and concern for enteritis / SBO now s/p GGC via NG tube without contrast in the colon but ROBF. NG removed 7/13. Tolerating diet advancement to clears. Labs stable and leukocytosis resolved.  Okay for diet advancement and if patient tolerating well okay for discharge from surgical standpoint    PLAN:   -Advance diet as tolerated  -Continue ambulation as able QID  - No surgical intervention planned for this patient, surgery will sign off at this time okay for discharge when tolerating diet    SUBJECTIVE:   Rogers Martinez was seen on rounds.  He is feeling well, tolerating liquid diet.  He is passing flatus and having bowel movements, bowel movements are thickening up since the initial watery output.  Denies any fever, chills, nausea, vomiting, increased belching.  Longer discussion with patient regarding etiologies of SBO, management, strategies for avoiding the OR and why, etc.  Patient is send and is hungry would like to advance diet.    VITALS RANGE:  Temp:  [98  F (36.7  C)-99.2  F (37.3  C)] 99.2  F (37.3  C)  Pulse:  [55-59] 55  Resp:  [17-18] 17  BP: (115-135)/(65-77) 126/77  SpO2:  [95 %-97 %] 97 %    Physical Exam:  General: patient seen resting in bed in no acute distress  Resp: no increased work of breathing, breathing comfortably on room air  Abdomen: Soft and only mildly tender near the previous open appendectomy incision site otherwise generally nontender with palpation over all other quadrants.  Mildly distended  Extremities: No edema, cyanosis, or obvious deformities visualized on exam    Admission on 07/11/2025   Component Date Value    WBC Count 07/11/2025 14.3 (H)     RBC Count 07/11/2025 5.08     Hemoglobin 07/11/2025 16.0     Hematocrit 07/11/2025 46.7     MCV 07/11/2025 92     MCH  07/11/2025 31.5     MCHC 07/11/2025 34.3     RDW 07/11/2025 13.3     Platelet Count 07/11/2025 273     Sodium 07/11/2025 140     Potassium 07/11/2025 4.3     Chloride 07/11/2025 102     Carbon Dioxide (CO2) 07/11/2025 25     Anion Gap 07/11/2025 13     Urea Nitrogen 07/11/2025 8.9     Creatinine 07/11/2025 0.91     GFR Estimate 07/11/2025 >90     Calcium 07/11/2025 9.5     Glucose 07/11/2025 126 (H)     Protein Total 07/11/2025 8.0     Albumin 07/11/2025 4.6     Bilirubin Total 07/11/2025 2.4 (H)     Alkaline Phosphatase 07/11/2025 124     AST 07/11/2025 29     ALT 07/11/2025 39     Bilirubin Direct 07/11/2025 0.54 (H)     Lipase 07/11/2025 25     Color Urine 07/11/2025 Light Yellow     Appearance Urine 07/11/2025 Clear     Glucose Urine 07/11/2025 Negative     Bilirubin Urine 07/11/2025 Negative     Ketones Urine 07/11/2025 Negative     Specific Gravity Urine 07/11/2025 1.005     Blood Urine 07/11/2025 Negative     pH Urine 07/11/2025 6.0     Protein Albumin Urine 07/11/2025 Negative     Urobilinogen Urine 07/11/2025 Normal     Nitrite Urine 07/11/2025 Negative     Leukocyte Esterase Urine 07/11/2025 Negative     Mucus Urine 07/11/2025 Present (A)     RBC Urine 07/11/2025 <1     WBC Urine 07/11/2025 <1     Sodium 07/12/2025 142     Potassium 07/12/2025 3.9     Chloride 07/12/2025 104     Carbon Dioxide (CO2) 07/12/2025 29     Anion Gap 07/12/2025 9     Urea Nitrogen 07/12/2025 10.5     Creatinine 07/12/2025 0.95     GFR Estimate 07/12/2025 88     Calcium 07/12/2025 8.7 (L)     Glucose 07/12/2025 111 (H)     WBC Count 07/12/2025 7.3     RBC Count 07/12/2025 4.63     Hemoglobin 07/12/2025 14.4     Hematocrit 07/12/2025 43.3     MCV 07/12/2025 94     MCH 07/12/2025 31.1     MCHC 07/12/2025 33.3     RDW 07/12/2025 13.5     Platelet Count 07/12/2025 261     Hemoglobin 07/13/2025 13.0 (L)     MCV 07/13/2025 93     Hemoglobin 07/14/2025 12.6 (L)     MCV 07/14/2025 92         Suly Huffman PA-C  [facility]  [facility]  [facility]  [address]  [place] [address]  [facility] [phone]

## 2025-07-14 NOTE — PLAN OF CARE
"  Problem: Adult Inpatient Plan of Care  Goal: Patient-Specific Goal (Individualized)  Description: You can add care plan individualizations to a care plan. Examples of Individualization might be:  \"Parent requests to be called daily at 9am for status\", \"I have a hard time hearing out of my right ear\", or \"Do not touch me to wake me up as it startles  me\".  Outcome: Progressing  Goal: Absence of Hospital-Acquired Illness or Injury  Outcome: Progressing  Intervention: Identify and Manage Fall Risk  Recent Flowsheet Documentation  Taken 7/13/2025 1554 by Adelaide Hutchisn RN  Safety Promotion/Fall Prevention:   clutter free environment maintained   patient and family education   nonskid shoes/slippers when out of bed   safety round/check completed   room organization consistent  Taken 7/13/2025 1143 by Adelaide Hutchins RN  Safety Promotion/Fall Prevention:   clutter free environment maintained   patient and family education   nonskid shoes/slippers when out of bed   safety round/check completed   room organization consistent  Goal: Optimal Comfort and Wellbeing  7/13/2025 1948 by Adelaide Hutchins RN  Outcome: Progressing  7/13/2025 1407 by Adelaide Hutchins RN  Outcome: Progressing  Goal: Readiness for Transition of Care  Outcome: Progressing     Problem: Delirium  Goal: Optimal Coping  Outcome: Progressing  Goal: Improved Behavioral Control  Outcome: Progressing  Intervention: Minimize Safety Risk  Recent Flowsheet Documentation  Taken 7/13/2025 1554 by Adelaide Hutchins RN  Enhanced Safety Measures: review medications for side effects with activity  Taken 7/13/2025 1143 by Adelaide Hutchins RN  Enhanced Safety Measures: review medications for side effects with activity  Goal: Improved Attention and Thought Clarity  Outcome: Progressing  Goal: Improved Sleep  Outcome: Progressing     Problem: Nausea and Vomiting  Goal: Nausea and Vomiting Relief  7/13/2025 1948 by Adelaide Hutchins, RN  Outcome: Progressing  7/13/2025 1407 by Cuong" Adelaide FINCH RN  Outcome: Progressing     Problem: Pain Acute  Goal: Optimal Pain Control and Function  7/13/2025 1948 by Adelaide Hutchins RN  Outcome: Progressing  7/13/2025 1407 by Adelaide Hutchins RN  Outcome: Progressing  Intervention: Prevent or Manage Pain  Recent Flowsheet Documentation  Taken 7/13/2025 1554 by Adelaide Hutchins, RN  Medication Review/Management: medications reviewed  Taken 7/13/2025 1143 by Adelaide Hutchins RN  Medication Review/Management: medications reviewed   Goal Outcome Evaluation:       Patient continues to deny pain and nausea. Tolerated full liquid diet. Surgery notified and was asked to put orders in to remove NGT and to discontinue IVF. Orders in place. Sinus neil on telemetry.

## 2025-07-14 NOTE — DISCHARGE SUMMARY
"[facility]  Hospitalist Discharge Summary      Date of Admission:  2025  Date of Discharge:  2025  Discharging Provider: David Garcia DO  Discharge Service: Hospitalist Service    Discharge Diagnoses   Active Problems:    SBO (small bowel obstruction) (H)        Clinically Significant Risk Factors     # Overweight: Estimated body mass index is 28.05 kg/m  as calculated from the following:    Height as of this encounter: 1.905 m (6' 3\").    Weight as of this encounter: 101.8 kg (224 lb 6.4 oz).       Follow-ups Needed After Discharge   Follow-up Appointments       Hospital Follow-up with Existing Primary Care Provider (PCP)          Schedule Primary Care visit within: 30 Days               Discharge Disposition   Discharged to home  Condition at discharge: Stable    Hospital Course   Rogers Martinez is a 65 year old male with a pertinent history of bowel resection, SBO, GERD, and hyperlipidemia who presents to this ED via walk-in for evaluation of nausea and emesis.  CT abdomen with partial/early high-grade SBO.    SBO  - Although leukocytosis noted, no other infectious s/s. SBO likely due to adhesions but per CT there is question of underlying inflammation as well.   - Surgery following, s/p gastrografin study   - IVF, diet, NGT per surgery    - NGT removed 25, patient tolerating normal diet 25   - Encourage ambulation    Acute anemia  - Reported dark coloration in NGT output. Hgb lower this AM. Patient clinically improving, believe hgb  is dilutional  - Trend hgb and consider GI consult     Question of distal ileitis and inflammatory stricture on CT  - No overt s/s of systemic inflammation besides mild leukocytosis as noted above (WBC has since normalized). No infectious s/s  - Monitor with serial exam  - Outpatient PCP follow-up     Chronic hyperbilirubinemia  - Noted since , monitor and recommend outpatient follow-up     A-fib  Hx ablation  - Contine PTA " meds    Consultations This Hospital Stay   SURGERY GENERAL IP CONSULT    Code Status   Full Code    Time Spent on this Encounter   I, David Garcia DO, personally saw the patient today and spent greater than 30 minutes discharging this patient.       David Garcia DO  [address] [address]  Phone: [phone]  Fax: [phone]  ______________________________________________________________________    Physical Exam   Vital Signs: Temp: 99.2  F (37.3  C) Temp src: Oral BP: 126/77 Pulse: 55   Resp: 17 SpO2: 97 % O2 Device: None (Room air)    Weight: 224 lbs 6.4 oz    General Appearance:  Nontoxic, no acute distress, walking comfortably without issue  Respiratory: Nonlabored breathing  Cardiovascular: RRR without ectopy  GI: Mild distention without rebound or guarding, no masses  Skin: No exposed rash or swollen joints  Other:  No focal deficits          Primary Care Physician   Ahsan Andrew    Discharge Orders      Reason for your hospital stay    Active Problems:    SBO (small bowel obstruction) (H)     Activity    Your activity upon discharge: activity as tolerated     Diet    Follow this diet upon discharge: Current Diet:Orders Placed This Encounter      Regular Diet Adult     Hospital Follow-up with Existing Primary Care Provider (PCP)            Significant Results and Procedures   Results for orders placed or performed during the hospital encounter of 07/11/25   CT Abdomen Pelvis w Contrast    Narrative    EXAM: CT ABDOMEN PELVIS W CONTRAST  LOCATION: [facility]  DATE: 7/11/2025    INDICATION: diffuse abd pain  COMPARISON: 11/07/2022  TECHNIQUE: CT scan of the abdomen and pelvis was performed following injection of IV contrast. Multiplanar reformats were obtained. Dose reduction techniques were used.  CONTRAST: IsoVue 370 90mL    FINDINGS:   LOWER CHEST: Normal.    HEPATOBILIARY: Normal.    PANCREAS:  Normal.    SPLEEN: Normal.    ADRENAL GLANDS: Normal.    KIDNEYS/BLADDER: Normal.    BOWEL: The stomach is moderately distended with fluid. Normal caliber of the proximal small bowel. The mid to distal small bowel is mildly dilated measuring up to approximately 3.2 cm with the distal small bowel relatively collapsed. Transition appears   to occur within the right lower abdomen (image 197, series 3). Fluid is seen within the nondilated colon extending to the sigmoid colon. Formed stool is seen in the rectum. There is mild wall thickening of segments of nondilated small bowel in the right   lower abdomen; the distribution of small bowel wall thickening appears similar to the prior examination and there is neural fat within the distal ileum, suggesting prior inflammation. There is a tiny appendiceal remnant, consistent with prior   appendectomy.    LYMPH NODES: No lymphadenopathy. Mild mesenteric edema. No significant ascites. No findings of bowel perforation or mesenteric abscess.    VASCULATURE: No abdominal aortic aneurysm. Mild calcified atherosclerosis    PELVIC ORGANS: Unchanged probable prostate utricle cyst. The prostate gland is normal in size. No pelvic free fluid.    MUSCULOSKELETAL: Normal.      Impression    IMPRESSION:   1.  Findings of partial or early high-grade small bowel obstruction with transition in the right lower abdomen, probably secondary to an adhesion; however, there is also findings of distal ileitis and obstruction may be at least partly secondary to an   inflammatory stricture. The distribution of findings appears similar to the prior examination 11/07/2022, suspicious for recurrent obstruction and recurrent distal ileitis, suspicious for inflammatory bowel disease.  2.  Additional incidental/chronic findings, as detailed.   XR Gastrografin Challenge    Narrative    EXAM: XR GASTROGRAFIN CHALLENGE  LOCATION: [facility]  DATE: 7/12/2025    INDICATION: Small Bowel  Obstruction  COMPARISON: Abdominal x-ray from yesterday at roughly 6:42 PM.  TECHNIQUE: Routine water soluble contrast follow-through challenge.      Impression    IMPRESSION:  1.  Ingested diluted enteric contrast within the persistently distended central small bowel in the setting of known SBO.  2.  No contrast within the colon.   XR Abdomen Port 1 View    Narrative    EXAM: XR ABDOMEN PORT 1 VIEW  LOCATION: [facility]  DATE: 7/11/2025    INDICATION: Abdominal pain.  COMPARISON: CT from today.      Impression    IMPRESSION: NG tube is in good position within stomach.       Discharge Medications      Review of your medicines        START taking        Dose / Directions   ondansetron 4 MG ODT tab  Commonly known as: ZOFRAN ODT      Dose: 4 mg  Take 1 tablet (4 mg) by mouth every 6 hours as needed for nausea or vomiting.  Quantity: 20 tablet  Refills: 0            CONTINUE these medicines which have NOT CHANGED        Dose / Directions   aspirin 81 MG EC tablet      Dose: 81 mg  Take 81 mg by mouth every morning.  Refills: 0     atorvastatin 10 MG tablet  Commonly known as: LIPITOR      Dose: 1 tablet  Take 1 tablet by mouth every morning.  Refills: 0     cholecalciferol 50 MCG (2000 UT) Caps      Dose: 2,000 Units  Take 2,000 Units by mouth every morning.  Refills: 0               Where to get your medicines        These medications were sent to [id number] - [place], [place] - [address]  [address] [address]      Phone: [phone]   ondansetron 4 MG ODT tab       Allergies   No Known Allergies

## 2025-07-14 NOTE — PLAN OF CARE
Goal Outcome Evaluation:  Diet is advance to regular . Pt tolerated well. No nausea, vomiting or c/o abdominal pain after eating regular lunch. Pt is discharged  home via wife. Explained AVS to pt.

## 2025-07-18 LAB
ATRIAL RATE - MUSE: 64 BPM
DIASTOLIC BLOOD PRESSURE - MUSE: 64 MMHG
INTERPRETATION ECG - MUSE: NORMAL
P AXIS - MUSE: 61 DEGREES
PR INTERVAL - MUSE: 156 MS
QRS DURATION - MUSE: 84 MS
QT - MUSE: 416 MS
QTC - MUSE: 429 MS
R AXIS - MUSE: 42 DEGREES
SYSTOLIC BLOOD PRESSURE - MUSE: 117 MMHG
T AXIS - MUSE: 66 DEGREES
VENTRICULAR RATE- MUSE: 64 BPM

## 2025-07-25 ENCOUNTER — LAB REQUISITION (OUTPATIENT)
Dept: LAB | Facility: CLINIC | Age: 65
End: 2025-07-25
Payer: MEDICARE

## 2025-07-25 DIAGNOSIS — E80.6 OTHER DISORDERS OF BILIRUBIN METABOLISM: ICD-10-CM

## 2025-07-25 PROCEDURE — 80053 COMPREHEN METABOLIC PANEL: CPT | Mod: ORL | Performed by: FAMILY MEDICINE

## 2025-07-26 LAB
ALBUMIN SERPL BCG-MCNC: 4.4 G/DL (ref 3.5–5.2)
ALP SERPL-CCNC: 102 U/L (ref 40–150)
ALT SERPL W P-5'-P-CCNC: 25 U/L (ref 0–70)
ANION GAP SERPL CALCULATED.3IONS-SCNC: 16 MMOL/L (ref 7–15)
AST SERPL W P-5'-P-CCNC: 25 U/L (ref 0–45)
BILIRUB SERPL-MCNC: 2.2 MG/DL
BUN SERPL-MCNC: 8 MG/DL (ref 8–23)
CALCIUM SERPL-MCNC: 8.9 MG/DL (ref 8.8–10.4)
CHLORIDE SERPL-SCNC: 105 MMOL/L (ref 98–107)
CREAT SERPL-MCNC: 0.99 MG/DL (ref 0.67–1.17)
EGFRCR SERPLBLD CKD-EPI 2021: 83 ML/MIN/1.73M2
GLUCOSE SERPL-MCNC: 126 MG/DL (ref 70–99)
HCO3 SERPL-SCNC: 20 MMOL/L (ref 22–29)
POTASSIUM SERPL-SCNC: 3.4 MMOL/L (ref 3.4–5.3)
PROT SERPL-MCNC: 7 G/DL (ref 6.4–8.3)
SODIUM SERPL-SCNC: 141 MMOL/L (ref 135–145)

## (undated) DEVICE — TUBE SET SMARKABLATE IRRIGATION

## (undated) DEVICE — CUSTOM PACK EP

## (undated) DEVICE — CATH NAV PENTARAY F CURVE

## (undated) DEVICE — CATH EP DECAPOLAR CS 7FR BI-DIRECTL FJ

## (undated) DEVICE — PATCH CARTO 3 EXTERNAL REFERENCE 3D MAPPING CREFP6

## (undated) DEVICE — CATH EP WOVENFLEXIE QUAD 5FRX110CM REPRO SYK200597

## (undated) DEVICE — GUIDEWIRE PROTRACK PGTL .025IN DIA 23

## (undated) DEVICE — INTRO SHEATH 9FRX10CM PINNACLE RSS902

## (undated) DEVICE — 8F SOUNDSTAR ECO ULTRASOUND CATHETER

## (undated) DEVICE — CATH THERMOCOOL SMARTTOUCH SF FJ CURVE

## (undated) DEVICE — NEEDLE 71CM NRG TRANSSEPTAL C0  8F FIX CURVE NRG-E-HF-71-C0

## (undated) DEVICE — Device

## (undated) DEVICE — INTRODUCER SHEATH FAST-CATH 6FRX12CM 406104

## (undated) DEVICE — SHEATH PINNACLE 8FR 10CM R/O II RSB802

## (undated) DEVICE — SHEATH INTRODUCER VIZIGO 17 MM SMALL CURVE D138501

## (undated) DEVICE — ELECTRODE ADULT PACING MULTI P-211-M1

## (undated) RX ORDER — FENTANYL CITRATE 50 UG/ML
INJECTION, SOLUTION INTRAMUSCULAR; INTRAVENOUS
Status: DISPENSED
Start: 2022-09-28

## (undated) RX ORDER — PROPOFOL 10 MG/ML
INJECTION, EMULSION INTRAVENOUS
Status: DISPENSED
Start: 2022-09-28

## (undated) RX ORDER — PHENYLEPHRINE HYDROCHLORIDE 10 MG/ML
INJECTION INTRAVENOUS
Status: DISPENSED
Start: 2022-09-28

## (undated) RX ORDER — PROTAMINE SULFATE 10 MG/ML
INJECTION, SOLUTION INTRAVENOUS
Status: DISPENSED
Start: 2022-09-28

## (undated) RX ORDER — HEPARIN SODIUM 10000 [USP'U]/100ML
INJECTION, SOLUTION INTRAVENOUS
Status: DISPENSED
Start: 2022-09-28

## (undated) RX ORDER — LIDOCAINE HYDROCHLORIDE 10 MG/ML
INJECTION, SOLUTION EPIDURAL; INFILTRATION; INTRACAUDAL; PERINEURAL
Status: DISPENSED
Start: 2022-09-28

## (undated) RX ORDER — ONDANSETRON 2 MG/ML
INJECTION INTRAMUSCULAR; INTRAVENOUS
Status: DISPENSED
Start: 2022-09-28

## (undated) RX ORDER — ISOPROTERENOL HYDROCHLORIDE 0.2 MG/ML
INJECTION, SOLUTION INTRAVENOUS
Status: DISPENSED
Start: 2022-09-28

## (undated) RX ORDER — DEXAMETHASONE SODIUM PHOSPHATE 10 MG/ML
INJECTION, SOLUTION INTRAMUSCULAR; INTRAVENOUS
Status: DISPENSED
Start: 2022-09-28

## (undated) RX ORDER — HEPARIN SODIUM 1000 [USP'U]/ML
INJECTION, SOLUTION INTRAVENOUS; SUBCUTANEOUS
Status: DISPENSED
Start: 2022-09-28

## (undated) RX ORDER — ADENOSINE 3 MG/ML
INJECTION, SOLUTION INTRAVENOUS
Status: DISPENSED
Start: 2022-09-28